# Patient Record
Sex: FEMALE | Race: WHITE | NOT HISPANIC OR LATINO | Employment: OTHER | URBAN - METROPOLITAN AREA
[De-identification: names, ages, dates, MRNs, and addresses within clinical notes are randomized per-mention and may not be internally consistent; named-entity substitution may affect disease eponyms.]

---

## 2017-07-05 ENCOUNTER — NURSE TRIAGE (OUTPATIENT)
Dept: NURSING | Facility: CLINIC | Age: 32
End: 2017-07-05

## 2017-07-06 NOTE — TELEPHONE ENCOUNTER
"Garfield states that she was on a slip and slide yesterday and she hit her hard very hard on the ground and hit a crack and she has immediate pain pain and nausea, no lose of consciousness, today she has felt out of it/spacey, moderate headache, and numbness at the base of her neck. Encourage her to seek treatment at ER and will go to [place].  Reason for Disposition    Dangerous injury (e.g., MVA, diving, trampoline, contact sports, fall > 10 feet or 3 meters) or severe blow from hard object (e.g., golf club or baseball bat)    Additional Information    Negative: [1] ACUTE NEURO SYMPTOM AND [2] present now  (DEFINITION: difficult to awaken OR confused thinking and talking OR slurred speech OR weakness of arms OR unsteady walking)    Negative: Knocked out (unconscious) > 1 minute    Negative: Seizure (convulsion) occurred  (Exception: prior history of seizures and now alert and without Acute Neuro Symptoms)    Negative: Penetrating head injury (e.g., knife, gun shot wound, metal object)    Negative: [1] Major bleeding (e.g., actively dripping or spurting) AND [2] can't be stopped    Negative: [1] Dangerous mechanism of injury (e.g., MVA, diving, trampoline, contact sports, fall > 10 feet or 3 meters) AND [2] NECK pain AND [3] began < 1 hour after injury    Negative: Sounds like a life-threatening emergency to the triager    Negative: [1] Recently examined and diagnosed with a concussion by a healthcare provider AND [2] questions about concussion symptoms    Negative: Can't remember what happened (amnesia)    Negative: Vomiting once or more    Negative: [1] Loss of vision or double vision AND [2] present now    Negative: Watery or blood-tinged fluid dripping from the NOSE or EARS now  (Exception: tears from crying or nosebleed from nasal trauma)    Negative: One or two \"black eyes\" (bruising, purple color of eyelids)    Negative: [1] Large swelling AND [2] size > palm of person's hand    Negative: Skin is split open " or gaping  (or length > 1/2 inch or 12 mm)    Negative: [1] Bleeding AND [2] won't stop after 10 minutes of direct pressure (using correct technique)    Negative: Sounds like a serious injury to the triager    Negative: [1] ACUTE NEURO SYMPTOM AND [2] now fine  (DEFINITION: difficult to awaken OR confused thinking and talking OR slurred speech OR weakness of arms OR unsteady walking)    Negative: [1] Knocked out (unconscious) < 1 minute AND [2] now fine    Negative: [1] SEVERE headache AND [2]  not improved 2 hours after pain medicine/ice packs    Negative: Taking Coumadin (warfarin) or other strong blood thinner, or known bleeding disorder (e.g., thrombocytopenia)    Protocols used: HEAD INJURY-ADULT-    Sue Rodney, RN, BSN  [facility]

## 2017-10-17 ENCOUNTER — TRANSFERRED RECORDS (OUTPATIENT)
Dept: HEALTH INFORMATION MANAGEMENT | Facility: CLINIC | Age: 32
End: 2017-10-17

## 2017-10-17 LAB
HPV ABSTRACT: NORMAL
PAP-ABSTRACT: NORMAL

## 2018-08-21 ENCOUNTER — TRANSFERRED RECORDS (OUTPATIENT)
Dept: HEALTH INFORMATION MANAGEMENT | Facility: CLINIC | Age: 33
End: 2018-08-21

## 2019-03-11 ENCOUNTER — OFFICE VISIT (OUTPATIENT)
Dept: FAMILY MEDICINE | Facility: CLINIC | Age: 34
End: 2019-03-11
Payer: COMMERCIAL

## 2019-03-11 VITALS
WEIGHT: 160 LBS | DIASTOLIC BLOOD PRESSURE: 84 MMHG | HEIGHT: 67 IN | RESPIRATION RATE: 16 BRPM | BODY MASS INDEX: 25.11 KG/M2 | TEMPERATURE: 98 F | HEART RATE: 76 BPM | SYSTOLIC BLOOD PRESSURE: 126 MMHG

## 2019-03-11 DIAGNOSIS — Z00.00 ROUTINE GENERAL MEDICAL EXAMINATION AT A HEALTH CARE FACILITY: Primary | ICD-10-CM

## 2019-03-11 DIAGNOSIS — G43.009 MIGRAINE WITHOUT AURA AND WITHOUT STATUS MIGRAINOSUS, NOT INTRACTABLE: ICD-10-CM

## 2019-03-11 DIAGNOSIS — F41.9 ANXIETY: ICD-10-CM

## 2019-03-11 DIAGNOSIS — Z11.4 SCREENING FOR HIV (HUMAN IMMUNODEFICIENCY VIRUS): ICD-10-CM

## 2019-03-11 DIAGNOSIS — F90.8 ADHD, ADULT RESIDUAL TYPE: ICD-10-CM

## 2019-03-11 PROCEDURE — 80061 LIPID PANEL: CPT | Performed by: FAMILY MEDICINE

## 2019-03-11 PROCEDURE — 99385 PREV VISIT NEW AGE 18-39: CPT | Performed by: FAMILY MEDICINE

## 2019-03-11 PROCEDURE — 36415 COLL VENOUS BLD VENIPUNCTURE: CPT | Performed by: FAMILY MEDICINE

## 2019-03-11 PROCEDURE — 80048 BASIC METABOLIC PNL TOTAL CA: CPT | Performed by: FAMILY MEDICINE

## 2019-03-11 RX ORDER — CLONIDINE 0.2 MG/24H
1 PATCH, EXTENDED RELEASE TRANSDERMAL WEEKLY
COMMUNITY
End: 2019-04-09

## 2019-03-11 RX ORDER — LAMOTRIGINE 100 MG/1
100 TABLET ORAL 2 TIMES DAILY
COMMUNITY
End: 2019-06-10

## 2019-03-11 RX ORDER — CLONIDINE HYDROCHLORIDE 0.2 MG/1
0.2 TABLET ORAL 2 TIMES DAILY
COMMUNITY
End: 2019-06-10

## 2019-03-11 RX ORDER — DEXTROAMPHETAMINE SACCHARATE, AMPHETAMINE ASPARTATE MONOHYDRATE, DEXTROAMPHETAMINE SULFATE AND AMPHETAMINE SULFATE 2.5; 2.5; 2.5; 2.5 MG/1; MG/1; MG/1; MG/1
10 CAPSULE, EXTENDED RELEASE ORAL 2 TIMES DAILY
COMMUNITY
End: 2019-04-09

## 2019-03-11 SDOH — HEALTH STABILITY: MENTAL HEALTH: HOW OFTEN DO YOU HAVE A DRINK CONTAINING ALCOHOL?: 2-3 TIMES A WEEK

## 2019-03-11 ASSESSMENT — ENCOUNTER SYMPTOMS
BREAST MASS: 0
PARESTHESIAS: 0
COUGH: 0
DIZZINESS: 0
SHORTNESS OF BREATH: 0
ARTHRALGIAS: 0
PALPITATIONS: 0
DYSURIA: 0
JOINT SWELLING: 0
EYE PAIN: 0
FEVER: 0
CHILLS: 0
DIARRHEA: 0
WEAKNESS: 1
HEARTBURN: 0
HEADACHES: 1
FREQUENCY: 0
HEMATOCHEZIA: 0
HEMATURIA: 0
NERVOUS/ANXIOUS: 0
SORE THROAT: 0
CONSTIPATION: 0
ABDOMINAL PAIN: 0
MYALGIAS: 1
NAUSEA: 0

## 2019-03-11 ASSESSMENT — MIFFLIN-ST. JEOR: SCORE: 1448.39

## 2019-03-11 NOTE — PROGRESS NOTES
SUBJECTIVE:   CC: Chelle Noyola is an 34 year old woman who presents for preventive health visit.     Healthy Habits:  Answers for HPI/ROS submitted by the patient on 3/11/2019   Annual Exam:  Frequency of exercise:: 1 day/week  Getting at least 3 servings of Calcium per day:: Yes  Diet:: Regular (no restrictions)  Taking medications regularly:: Yes  Medication side effects:: Muscle aches, Other  Bi-annual eye exam:: Yes  Dental care twice a year:: Yes  Sleep apnea or symptoms of sleep apnea:: None  Positive for the following: congestion, headaches, mood changes, myalgias, weakness  Negative for the following: abdominal pain, Blood in stool, Blood in urine, chest pain, chills, constipation, cough, diarrhea, dizziness, ear pain, eye pain, nervous/anxious, fever, frequency, genital sores, hearing loss, heartburn, arthralgias, joint swelling, peripheral edema, nausea, dysuria, palpitations, Skin sensation changes, sore throat, urgency, rash, shortness of breath, visual disturbance  pelvic pain: No  vaginal bleeding: No  vaginal discharge: No  tenderness: No  breast mass: No  breast discharge: No  Additional concerns today:: No  Duration of exercise:: Less than 15 minutes    PmHx, SHx, FHx reviewed and updated.    Last pap 2 years ago at [facility].  No recent history of abnormal pap.    Noting some numbness in legs when standing for a long time.  Some neck pain.  .  Does have [place] CTS and has w/u for MS in the past.  Does see a chiropractor, finds this helpful.    Today's PHQ-2 Score:   PHQ-2 ( 1999 [employer]) 3/11/2019 3/11/2019   Q1: Little interest or pleasure in doing things 0 1   Q2: Feeling down, depressed or hopeless 0 1   PHQ-2 Score 0 2   Q1: Little interest or pleasure in doing things Several days -   Q2: Feeling down, depressed or hopeless Several days -   PHQ-2 Score 2 -     Abuse: Current or Past(Physical, Sexual or Emotional)- No  Do you feel safe in your environment? Yes    Social History  "    Tobacco Use     Smoking status: Former Smoker     Smokeless tobacco: Never Used   Substance Use Topics     Alcohol use: Yes     Frequency: 2-3 times a week     If you drink alcohol do you typically have >3 drinks per day or >7 drinks per week? No                     Reviewed orders with patient.  Reviewed health maintenance and updated orders accordingly - Yes    Mammogram not appropriate for this patient based on age.    Pertinent mammograms are reviewed under the imaging tab.  History of abnormal Pap smear: YES - updated in Problem List and Health Maintenance accordingly     Reviewed and updated as needed this visit by clinical staff  Tobacco  Soc Hx        Reviewed and updated as needed this visit by Provider          ROS:  CONSTITUTIONAL: NEGATIVE for fever, chills, change in weight  INTEGUMENTARU/SKIN: NEGATIVE for worrisome rashes, moles or lesions  EYES: NEGATIVE for vision changes or irritation  ENT: NEGATIVE for ear, mouth and throat problems  RESP: NEGATIVE for significant cough or SOB  BREAST: NEGATIVE for masses, tenderness or discharge  CV: NEGATIVE for chest pain, palpitations or peripheral edema  GI: NEGATIVE for nausea, abdominal pain, heartburn, or change in bowel habits  : NEGATIVE for unusual urinary or vaginal symptoms. Periods are regular.  MUSCULOSKELETAL: NEGATIVE for significant arthralgias or myalgia  NEURO: NEGATIVE for weakness, dizziness or paresthesias  PSYCHIATRIC: NEGATIVE for changes in mood or affect    OBJECTIVE:   /84 (BP Location: Right arm, Patient Position: Chair, Cuff Size: Adult Regular)   Pulse 76   Temp 98  F (36.7  C) (Oral)   Resp 16   Ht 1.702 m (5' 7\")   Wt 72.6 kg (160 lb)   LMP 03/06/2019 (LMP Unknown)   Breastfeeding? No   BMI 25.06 kg/m    EXAM:  GENERAL: healthy, alert and no distress  EYES: Eyes grossly normal to inspection, PERRL and conjunctivae and sclerae normal  HENT: ear canals and TM's normal, nose and mouth without ulcers or " "lesions  NECK: no adenopathy, no asymmetry, masses, or scars and thyroid normal to palpation  RESP: lungs clear to auscultation - no rales, rhonchi or wheezes  BREAST: normal without masses, tenderness or nipple discharge and no palpable axillary masses or adenopathy  CV: regular rate and rhythm, normal S1 S2, no S3 or S4, no murmur, click or rub, no peripheral edema and peripheral pulses strong  ABDOMEN: soft, nontender, no hepatosplenomegaly, no masses and bowel sounds normal  MS: no gross musculoskeletal defects noted, no edema  SKIN: no suspicious lesions or rashes  NEURO: Normal strength and tone, mentation intact and speech normal  PSYCH: mentation appears normal, affect normal/bright    Diagnostic Test Results:  none     ASSESSMENT/PLAN:   1. Routine general medical examination at a health care facility    - Basic metabolic panel  - Lipid panel reflex to direct LDL Fasting    2. Screening for HIV (human immunodeficiency virus)  defer    3. ADHD, adult residual type  Update PL    4. Anxiety  Update PL    5. Migraine without aura and without status migrainosus, not intractable  Updating PL      COUNSELING:   Reviewed preventive health counseling, as reflected in patient instructions       Regular exercise       Vision screening    BP Readings from Last 1 Encounters:   03/11/19 126/84     Estimated body mass index is 25.06 kg/m  as calculated from the following:    Height as of this encounter: 1.702 m (5' 7\").    Weight as of this encounter: 72.6 kg (160 lb).           reports that she has quit smoking. she has never used smokeless tobacco.      Counseling Resources:  ATP IV Guidelines  Pooled Cohorts Equation Calculator  Breast Cancer Risk Calculator  FRAX Risk Assessment  ICSI Preventive Guidelines  Dietary Guidelines for Americans, 2010  USDA's MyPlate  ASA Prophylaxis  Lung CA Screening    Steve Echevarria MD  [facility]  "

## 2019-03-11 NOTE — LETTER
March 14, 2019      Chelle Noyola  [address] [place] [address]        Chelle,     Your recent labs are within acceptable ranges.     Resulted Orders   Basic metabolic panel   Result Value Ref Range    Sodium 136 133 - 144 mmol/L    Potassium 4.3 3.4 - 5.3 mmol/L    Chloride 104 94 - 109 mmol/L    Carbon Dioxide 27 20 - 32 mmol/L    Anion Gap 5 3 - 14 mmol/L    Glucose 94 70 - 99 mg/dL    Urea Nitrogen 9 7 - 30 mg/dL    Creatinine 0.90 0.52 - 1.04 mg/dL    GFR Estimate 83 >60 mL/min/[1.73_m2]      Comment:      Non  GFR Calc  Starting 12/18/2018, serum creatinine based estimated GFR (eGFR) will be   calculated using the Chronic Kidney Disease Epidemiology Collaboration   (CKD-EPI) equation.      GFR Estimate If Black >90 >60 mL/min/[1.73_m2]      Comment:       GFR Calc  Starting 12/18/2018, serum creatinine based estimated GFR (eGFR) will be   calculated using the Chronic Kidney Disease Epidemiology Collaboration   (CKD-EPI) equation.      Calcium 9.1 8.5 - 10.1 mg/dL   Lipid panel reflex to direct LDL Fasting   Result Value Ref Range    Cholesterol 202 (H) <200 mg/dL      Comment:      Desirable:       <200 mg/dl    Triglycerides 59 <150 mg/dL    HDL Cholesterol 75 >49 mg/dL    LDL Cholesterol Calculated 115 (H) <100 mg/dL      Comment:      Above desirable:  100-129 mg/dl  Borderline High:  130-159 mg/dL  High:             160-189 mg/dL  Very high:       >189 mg/dl      Non HDL Cholesterol 127 <130 mg/dL       If you have any questions or concerns, please call the clinic at the number listed above.       Sincerely,    Steve Echevarria MD/hima

## 2019-03-12 LAB
ANION GAP SERPL CALCULATED.3IONS-SCNC: 5 MMOL/L (ref 3–14)
BUN SERPL-MCNC: 9 MG/DL (ref 7–30)
CALCIUM SERPL-MCNC: 9.1 MG/DL (ref 8.5–10.1)
CHLORIDE SERPL-SCNC: 104 MMOL/L (ref 94–109)
CHOLEST SERPL-MCNC: 202 MG/DL
CO2 SERPL-SCNC: 27 MMOL/L (ref 20–32)
CREAT SERPL-MCNC: 0.9 MG/DL (ref 0.52–1.04)
GFR SERPL CREATININE-BSD FRML MDRD: 83 ML/MIN/{1.73_M2}
GLUCOSE SERPL-MCNC: 94 MG/DL (ref 70–99)
HDLC SERPL-MCNC: 75 MG/DL
LDLC SERPL CALC-MCNC: 115 MG/DL
NONHDLC SERPL-MCNC: 127 MG/DL
POTASSIUM SERPL-SCNC: 4.3 MMOL/L (ref 3.4–5.3)
SODIUM SERPL-SCNC: 136 MMOL/L (ref 133–144)
TRIGL SERPL-MCNC: 59 MG/DL

## 2019-04-09 ENCOUNTER — OFFICE VISIT (OUTPATIENT)
Dept: FAMILY MEDICINE | Facility: CLINIC | Age: 34
End: 2019-04-09
Payer: COMMERCIAL

## 2019-04-09 VITALS
WEIGHT: 153 LBS | HEIGHT: 67 IN | HEART RATE: 98 BPM | SYSTOLIC BLOOD PRESSURE: 145 MMHG | BODY MASS INDEX: 24.01 KG/M2 | TEMPERATURE: 98.7 F | DIASTOLIC BLOOD PRESSURE: 95 MMHG | RESPIRATION RATE: 16 BRPM | OXYGEN SATURATION: 100 %

## 2019-04-09 DIAGNOSIS — R03.0 ELEVATED BP WITHOUT DIAGNOSIS OF HYPERTENSION: ICD-10-CM

## 2019-04-09 DIAGNOSIS — F41.9 ANXIETY: ICD-10-CM

## 2019-04-09 DIAGNOSIS — F90.8 ADHD, ADULT RESIDUAL TYPE: Primary | ICD-10-CM

## 2019-04-09 PROCEDURE — 99214 OFFICE O/P EST MOD 30 MIN: CPT | Performed by: NURSE PRACTITIONER

## 2019-04-09 RX ORDER — DEXTROAMPHETAMINE SACCHARATE, AMPHETAMINE ASPARTATE MONOHYDRATE, DEXTROAMPHETAMINE SULFATE AND AMPHETAMINE SULFATE 2.5; 2.5; 2.5; 2.5 MG/1; MG/1; MG/1; MG/1
10 CAPSULE, EXTENDED RELEASE ORAL DAILY
Qty: 30 CAPSULE | Refills: 0 | Status: SHIPPED | OUTPATIENT
Start: 2019-04-09 | End: 2019-04-15

## 2019-04-09 RX ORDER — DEXTROAMPHETAMINE SACCHARATE, AMPHETAMINE ASPARTATE MONOHYDRATE, DEXTROAMPHETAMINE SULFATE AND AMPHETAMINE SULFATE 2.5; 2.5; 2.5; 2.5 MG/1; MG/1; MG/1; MG/1
10 CAPSULE, EXTENDED RELEASE ORAL 2 TIMES DAILY
Status: CANCELLED | OUTPATIENT
Start: 2019-04-09

## 2019-04-09 ASSESSMENT — ANXIETY QUESTIONNAIRES
6. BECOMING EASILY ANNOYED OR IRRITABLE: NEARLY EVERY DAY
3. WORRYING TOO MUCH ABOUT DIFFERENT THINGS: MORE THAN HALF THE DAYS
GAD7 TOTAL SCORE: 12
2. NOT BEING ABLE TO STOP OR CONTROL WORRYING: MORE THAN HALF THE DAYS
5. BEING SO RESTLESS THAT IT IS HARD TO SIT STILL: SEVERAL DAYS
1. FEELING NERVOUS, ANXIOUS, OR ON EDGE: MORE THAN HALF THE DAYS
7. FEELING AFRAID AS IF SOMETHING AWFUL MIGHT HAPPEN: SEVERAL DAYS

## 2019-04-09 ASSESSMENT — PATIENT HEALTH QUESTIONNAIRE - PHQ9
SUM OF ALL RESPONSES TO PHQ QUESTIONS 1-9: 8
5. POOR APPETITE OR OVEREATING: SEVERAL DAYS

## 2019-04-09 ASSESSMENT — MIFFLIN-ST. JEOR: SCORE: 1416.63

## 2019-04-09 NOTE — PROGRESS NOTES
"  SUBJECTIVE:   Chelle Noyola is a 34 year old female who presents to clinic today for the following   health issues:         Medication Followup of Adderall    Taking Medication as prescribed: yes    Side Effects:  None    Medication Helping Symptoms:  yes   Prior to adderall was on vyvanse--too expensive.  Depression/anxiety--effexor; now lamictal/clonidine.         She was being seen at [place] by Karen Diaz and had testing for ADHD.  She is currently taking 20 mg of adderall xr daily.  She tried vyvanse and this was too expensive.  She occasionally feels itchy, so will take benadryl as needed.  She is taking adderall for focus/inattention and fidgeting.  This helps, but sometimes feels like she has an \"air head\".  She is taking 100 mg of lamictal in the morning for PTSD/anxiety.  She takes 1/2 tab of clonidine TID, but sometimes only BID in addition to lamictal.  Her [facility] provider told her she could \"play around\" with the dosing.  The clonidine was for irritability.  Prior to this combo she was on effexor, but was gaining weight so switched meds. Typically not someone who cries, but lately has been crying more.  Having intermittent episodes of increased anxiety.  Has tried several meds in the past.    Effexor--weight gain  zoloft--pain attacks  lexapro--not sure, thinks it just didn't work?  wellbutrin--not sure, thinks it just didn't work?  buspar--not sure what side effect she experienced with this   ISABEL-7 SCORE 4/9/2019 4/15/2019   Total Score - 13 (moderate anxiety)   Total Score 12 13     PHQ-9 SCORE 4/9/2019 4/15/2019   PHQ-9 Total Score MyChart - 9 (Mild depression)   PHQ-9 Total Score 8 9                 Additional history: as documented    Reviewed  and updated as needed this visit by clinical staff  Tobacco  Allergies  Meds  Med Hx  Surg Hx  Fam Hx  Soc Hx        Reviewed and updated as needed this visit by Provider         Current Outpatient Medications   Medication Sig Dispense Refill " "    cloNIDine (CATAPRES) 0.2 MG tablet Take 0.2 mg by mouth 2 times daily 0.2 mg BID plus 1/2 tab prn       lamoTRIgine (LAMICTAL) 100 MG tablet Take 100 mg by mouth 2 times daily       [START ON 5/9/2019] amphetamine-dextroamphetamine (ADDERALL XR) 10 MG 24 hr capsule Take 1 capsule (10 mg) by mouth daily 30 capsule 0     FLUoxetine (PROZAC) 10 MG capsule 1 cap daily for 7 days, then increase to 20 mg daily 7 capsule 0     FLUoxetine (PROZAC) 20 MG capsule Take 1 capsule (20 mg) by mouth daily 30 capsule 1     No Known Allergies    ROS:  Constitutional, HEENT, cardiovascular, pulmonary, gi and gu and psych systems are negative, except as otherwise noted.    OBJECTIVE:     BP (!) 145/95   Pulse 98   Temp 98.7  F (37.1  C) (Oral)   Resp 16   Ht 1.702 m (5' 7\")   Wt 69.4 kg (153 lb)   LMP 03/06/2019 (LMP Unknown)   SpO2 100%   BMI 23.96 kg/m    Body mass index is 23.96 kg/m .  GENERAL: healthy, alert and no distress  PSYCH: mentation appears normal, affect normal/bright, tangential/scattered    Diagnostic Test Results:  none     ASSESSMENT/PLAN:   1. ADHD, adult residual type  Currently on adderall.  She's not sure how well this is working and possibly causing bothersome side effects.  Will defer changes to psych.    - MENTAL HEALTH REFERRAL  - Adult; Psychiatry and Medication Management; Psychiatry; [facility]: [facility] [phone].  Medication management & future refills will be returned to [facility] PCP upon completion of evaluation; We xiomara...    2. Anxiety  Currently on lamictal and clonidine which are medications I don't generally use for anxiety and it doesn't seem that these are working well for her.  She has certainly tried several other medications and stopped them for various reasons/side effects.   Given several med trials will have her see psych.  She agrees with plan today.   - MENTAL HEALTH REFERRAL  - Adult; Psychiatry and Medication Management; Psychiatry; [facility]: [facility]" [facility] Psychiatry Service [phone].  Medication management & future refills will be returned to [facility] PCP upon completion of evaluation; We xiomara...    3. Elevated BP without diagnosis of hypertension  BP elevated today though did improve some on recheck.  Discussed will need to monitor closely if is going to continue on a stimulant.      RAFI Buckley [facility]

## 2019-04-09 NOTE — PROGRESS NOTES
"  SUBJECTIVE:   Chelle Noyola is a 34 year old female who presents to clinic today for the following   health issues:      {Chronic Problem SUPERLIST:020286}    Amount of exercise or physical activity: {Exercise frequency days per week:528870}    Problems taking medications regularly: {Med Problems:814514::\"No\"}    Medication side effects: {CHRONIC MED SIDE EFFECTS:137378::\"none\"}    Diet: { :220609}        {additional problems for provider to add:949238}    Additional history: {NONE - AS DOCUMENTED:466207::\"as documented\"}    Reviewed  and updated as needed this visit by clinical staff         Reviewed and updated as needed this visit by Provider         {HIST REVIEW/ LINKS 2:519572}    {PROVIDER CHARTING PREFERENCE:538186}        "

## 2019-04-10 ASSESSMENT — ANXIETY QUESTIONNAIRES: GAD7 TOTAL SCORE: 12

## 2019-04-15 ENCOUNTER — OFFICE VISIT (OUTPATIENT)
Dept: PSYCHIATRY | Facility: CLINIC | Age: 34
End: 2019-04-15
Attending: NURSE PRACTITIONER
Payer: COMMERCIAL

## 2019-04-15 VITALS
DIASTOLIC BLOOD PRESSURE: 78 MMHG | WEIGHT: 153 LBS | SYSTOLIC BLOOD PRESSURE: 117 MMHG | HEART RATE: 103 BPM | RESPIRATION RATE: 14 BRPM | OXYGEN SATURATION: 100 % | BODY MASS INDEX: 23.96 KG/M2

## 2019-04-15 DIAGNOSIS — F90.8 ADHD, ADULT RESIDUAL TYPE: ICD-10-CM

## 2019-04-15 DIAGNOSIS — F43.10 PTSD (POST-TRAUMATIC STRESS DISORDER): ICD-10-CM

## 2019-04-15 DIAGNOSIS — F41.1 GENERALIZED ANXIETY DISORDER: Primary | ICD-10-CM

## 2019-04-15 PROCEDURE — 90792 PSYCH DIAG EVAL W/MED SRVCS: CPT | Performed by: NURSE PRACTITIONER

## 2019-04-15 RX ORDER — DEXTROAMPHETAMINE SACCHARATE, AMPHETAMINE ASPARTATE MONOHYDRATE, DEXTROAMPHETAMINE SULFATE AND AMPHETAMINE SULFATE 2.5; 2.5; 2.5; 2.5 MG/1; MG/1; MG/1; MG/1
10 CAPSULE, EXTENDED RELEASE ORAL DAILY
Qty: 30 CAPSULE | Refills: 0 | Status: SHIPPED | OUTPATIENT
Start: 2019-05-09 | End: 2019-05-16

## 2019-04-15 RX ORDER — FLUOXETINE 10 MG/1
CAPSULE ORAL
Qty: 7 CAPSULE | Refills: 0 | Status: SHIPPED | OUTPATIENT
Start: 2019-04-15 | End: 2019-06-10

## 2019-04-15 ASSESSMENT — ANXIETY QUESTIONNAIRES
3. WORRYING TOO MUCH ABOUT DIFFERENT THINGS: MORE THAN HALF THE DAYS
4. TROUBLE RELAXING: SEVERAL DAYS
GAD7 TOTAL SCORE: 13
GAD7 TOTAL SCORE: 13
2. NOT BEING ABLE TO STOP OR CONTROL WORRYING: MORE THAN HALF THE DAYS
7. FEELING AFRAID AS IF SOMETHING AWFUL MIGHT HAPPEN: SEVERAL DAYS
GAD7 TOTAL SCORE: 13
7. FEELING AFRAID AS IF SOMETHING AWFUL MIGHT HAPPEN: SEVERAL DAYS
6. BECOMING EASILY ANNOYED OR IRRITABLE: NEARLY EVERY DAY
5. BEING SO RESTLESS THAT IT IS HARD TO SIT STILL: SEVERAL DAYS
1. FEELING NERVOUS, ANXIOUS, OR ON EDGE: NEARLY EVERY DAY

## 2019-04-15 ASSESSMENT — PATIENT HEALTH QUESTIONNAIRE - PHQ9
SUM OF ALL RESPONSES TO PHQ QUESTIONS 1-9: 9
10. IF YOU CHECKED OFF ANY PROBLEMS, HOW DIFFICULT HAVE THESE PROBLEMS MADE IT FOR YOU TO DO YOUR WORK, TAKE CARE OF THINGS AT HOME, OR GET ALONG WITH OTHER PEOPLE: SOMEWHAT DIFFICULT
SUM OF ALL RESPONSES TO PHQ QUESTIONS 1-9: 9

## 2019-04-15 NOTE — PROGRESS NOTES
"                                                         Outpatient Psychiatric Evaluation- Standard  Adult    Name:  Chelle Noyola  : 1985    Source of Referral:  Primary Care Provider: Janice Lemus?   Last visit: 19  Current Psychotherapist: None   Last visit: Not Interested     Identifying Data:  Patient is a 34 year old,   White American female  who presents for initial visit with me.  Patient is currently employed full time. Patient attended the session with Martir , who they agreed to have interview with. Consent to communicate signed for no-one. Consent for treatment signed and included in electronic medical record. Discussed limits of confidentiality today. My Practice Policy was reviewed and signed.     Patient prefers to be called: \"Chelle\"    Chief Complaint:    Patient reports: \"Weaned off Effexor due to side effects. It's been a roller coaster since.\"      HPI:    Patient reports being diagnosed with PTSD at age 17. Did not detail nature of trauma with me today. States he was prescribed some sequence of medications at that time and had side effects so \"I said to hell with meds\" and did not return to treatment. She sought out MH treatment again 5 years ago as son was developing and having MH issues of his own. Was having migraines and \"whirlwind\" emotions.    Early medication trials largely through PCP. Took Celexa, Lexapro, Zoloft, Cymbalta, Wellbutrin. Has admittedly vague recall of these trials. Just knows Zoloft induced panic and Wellbutrin seemed to worsen migraines. Thinks other antidepressants \"helped for a while, then didn't.\" Ended up on Effexor, which she had been taking for several years.    Was last in treatment with a psychiatrist, Karen Diaz, at [facility]. Saw Dr. Diaz for 6 months. Was also evaluated for, and diagnosed with, ADHD via testing. Was taking Effexor 150 mg daily during this period. Helped with irritability and anxiety, but still felt " "depressed. Also had 35 lb gradual weight gain. Dr. Diaz decreased Effexor to 112.5 mg daily. Also started her on Lamictal and Clonidine. She tapered Lamictal to 200 mg daily, but thinks this induced paranoia, so returned to 100 mg daily. She's not sure what it's doing for her other than \"dumbing me down some.\" States Clonidine has been helpful and was given torsten-way by Dr. Diaz to dose as needed. So will usually take 0.2 mg BID plus additional 0.1 mg as needed. Helps with anxiety and sleep quality, though does get some dizziness and speech slurring. Denies over sedation. Following ADHD diagnosis was prescribed Vyvanse; not sure of dosage; and endorses good response towards attention; felt calmer on it. Insurance would not cover Vyvanse, so she was switched to Adderall XR; states it's not as helpful or well tolerated at Vyvanse; 20 mg causes itchiness, so she's on 10 mg daily. She weaned herself off Effexor in 12/2018 and had transient withdrawal symptoms. She stopped seeing Dr. Diaz in 03/2019 as her insurance switched, and as psychiatrist left practice.    States accordingly her irritability and anxiety have rebounded. \"Everything bothers me\"; \"quick to snap\". Easy crying, racing thoughts, palpitations, shortness of breath. Her ADHD symptoms are pronounced and she was rather scattered in interview with me today.    States her 8 y/o son is doing quite well with Prozac, Ritalin and Guanfacine. Wondering if any of those medications could help her.    She is not interested in psychotherapy at this time.        Psychiatric Review of Symptoms:     PHQ-9 scores:   PHQ-9 SCORE 4/9/2019 4/15/2019   PHQ-9 Total Score MyChart - 9 (Mild depression)   PHQ-9 Total Score 8 9        ISABEL-7 scores:    ISABEL-7 SCORE 4/9/2019 4/15/2019   Total Score - 13 (moderate anxiety)   Total Score 12 13     Answers for HPI/ROS submitted by the patient on 4/15/2019   If you checked off any problems, how difficult have these problems made " it for you to do your work, take care of things at home, or get along with other people?: Somewhat difficult  PHQ9 TOTAL SCORE: 9  ISABEL 7 TOTAL SCORE: 13        Psychiatric History:   No psychiatric hospitalizations  No suicide attempts    Substance Use History:  Minimal EtOH  Denies other substance use  Tobacco: Yes; not looking to quit yet      Past Medical History:  History reviewed. No pertinent past medical history.   Surgery: History reviewed. No pertinent surgical history.  Allergies:   No Known Allergies  Primary Care Provider: Physician No Ref-Primary      Current Medications:    Current Outpatient Medications:      amphetamine-dextroamphetamine (ADDERALL XR) 10 MG 24 hr capsule, Take 1 capsule (10 mg) by mouth daily, Disp: 30 capsule, Rfl: 0     cloNIDine (CATAPRES) 0.2 MG tablet, Take 0.2 mg by mouth 2 times daily 0.2 mg BID plus 1/2 tab prn, Disp: , Rfl:      lamoTRIgine (LAMICTAL) 100 MG tablet, Take 100 mg by mouth 2 times daily, Disp: , Rfl:     The [place] Prescription Monitoring Program has been reviewed and there are no concerns about diversionary activity for controlled substances at this time.    Vital Signs:  Vitals: /78 (BP Location: Right arm, Patient Position: Chair, Cuff Size: Adult Large)   Pulse 103   Resp 14   Wt 69.4 kg (153 lb)   SpO2 100%   Breastfeeding? No   BMI 23.96 kg/m      Labs:  Most recent laboratory results reviewed and the pertinent results include:  Last Comprehensive Metabolic Panel:  Sodium   Date Value Ref Range Status   03/11/2019 136 133 - 144 mmol/L Final     Potassium   Date Value Ref Range Status   03/11/2019 4.3 3.4 - 5.3 mmol/L Final     Chloride   Date Value Ref Range Status   03/11/2019 104 94 - 109 mmol/L Final     Carbon Dioxide   Date Value Ref Range Status   03/11/2019 27 20 - 32 mmol/L Final     Anion Gap   Date Value Ref Range Status   03/11/2019 5 3 - 14 mmol/L Final     Glucose   Date Value Ref Range Status   03/11/2019 94 70 - 99 mg/dL  Final     Urea Nitrogen   Date Value Ref Range Status   03/11/2019 9 7 - 30 mg/dL Final     Creatinine   Date Value Ref Range Status   03/11/2019 0.90 0.52 - 1.04 mg/dL Final     GFR Estimate   Date Value Ref Range Status   03/11/2019 83 >60 mL/min/[1.73_m2] Final     Comment:     Non  GFR Calc  Starting 12/18/2018, serum creatinine based estimated GFR (eGFR) will be   calculated using the Chronic Kidney Disease Epidemiology Collaboration   (CKD-EPI) equation.       Calcium   Date Value Ref Range Status   03/11/2019 9.1 8.5 - 10.1 mg/dL Final         Review of Systems:  10 systems (general, cardiovascular, respiratory, eyes, ENT, endocrine, GI, , M/S, neurological) were reviewed. Most pertinent finding(s) is/are:  all unremarkable.    Family History:   Patient reported family history includes:   Family History   Problem Relation Age of Onset     Anxiety Disorder Mother      Other - See Comments Father 40        Accident     Attention Deficit Disorder Sister      Developmental delay Brother      Attention Deficit Disorder Son          Social History:   Grew up in [facility]  1 sister, 3 brothers  , 8 y/o son  Used to work in hospice; currently works in nail salon      Mental Status Examination:     Appearance:  awake, alert and adequately groomed  Attitude:  cooperative   Eye Contact:  good  Gait and Station: Normal  Psychomotor Behavior:  intact station, gait and muscle tone  Oriented to:  time, person, and place  Attention Span and Concentration:  Normal  Speech:  clear, coherent  Mood:  anxious  Affect:  appropriate and in normal range  Associations:  no loose associations  Thought Process:  tangental  Thought Content:  Appropriate to Interview  Recent and Remote Memory:  fair Not formally assessed. No amnesia.  Fund of Knowledge: appropriate  Insight:  good  Judgment:  intact  Impulse Control:  intact    Suicide Risk Assessment:  Today Chelle Noyola denies suicidal ideation or self-harm  impulses. Therefore, based on all available evidence including the factors cited above, Chelle Noyola does not appear to be at imminent risk for self-harm, does not meet criteria for a 72-hr hold, and therefore remains appropriate for ongoing outpatient level of care.  A thorough assessment of risk factors related to suicide and self-harm have been reviewed and are noted above. The patient convincingly denies acute suicidality on several occasions. Local community safety resources reviewed and printed for patient to use if needed. There was no deceit detected, and the patient presented in a manner that was believable.     DSM5  Diagnosis:  Attention-Deficit/Hyperactivity Disorder  314.01 (F90.9) Unspecified Attention -Deficit / Hyperactivity Disorder  296.32 (F33.1) Major Depressive Disorder, Recurrent Episode, Moderate _  309.81 (F43.10) Posttraumatic Stress Disorder (includes Posttraumatic Stress Disorder for Children 6 Years and Younger)  With dissociative symptoms    Medical Comorbidities Include:   Patient Active Problem List    Diagnosis Date Noted     ADHD, adult residual type 03/11/2019     Priority: Medium     Anxiety 03/11/2019     Priority: Medium     Migraine without aura and without status migrainosus, not intractable 03/11/2019     Priority: Medium       A 12-item WHODAS 2.0 assessment was completed by the patient today and recorded in EPIC.    WHODAS 2.0 Total Score 4/15/2019   Total Score 28   Total Score MyChart 28       The Patient Activation Measure (MINESH) score was completed and recorded in [facility]. This assesses patient knowledge, skill, and confidence for self-management. No flowsheet data found.             Impression:  Chelle Noyola is a 34 year old female, who is new to [place], who has a history of PTSD, depression and ADHD. There are no records yet available from previous psychiatric treatment at [facility]. She was pleasant and well-meaning, but was a limited historian and rather scattered  in interview today, but we made the most of the circumstances. I tend to believe she would truly first benefit from a serotonergic agent, should she find one tolerable and with sustained benefit. Lamictal seems perhaps superfluous, and she is over-utilizing Clonidine. In fact, her inconsistent dosing of higher-dose Clonidine may set her up for rebound hypertension.    Given her son's apparent benefit from Prozac, we agree to trial this presently. We'll maintain Lamictal at 100 mg for now, but down the road if she's doing well, we might be able to taper down on this. She should aim for consistency in her Clonidine dosing, and we agree to a schedule of 0.1 mg in AM, 0.2 mg at bedtime and an additional 0.1 mg as needed. She's advised not to abruptly discontinue Clonidine. It's a shame she cannot get Vyvanse covered by her insurance (she did check into this already with her new plan). We'll continue to utilize a sub-par Adderall XR 10 mg at this time, but I do wonder if down the line a switch to Concerta might be beneficial.    I think psychotherapy is advisable, but she is deferring on this presently.    Medication side effects and alternatives reviewed. Health promotion activities recommended and reviewed today. All questions addressed. Education and counseling completed regarding risks and benefits of medications and psychotherapy options. Collaborative Care Psychiatry Service model reviewed today. Recommend therapy for additional support.     Treatment Plan:    Start Prozac: 10 mg daily for 1 week, then increase to 20 mg daily    Continue Lamictal 100 mg daily    Continue Clonidine 0.1 mg in AM, 0.2 mg at bedtime and an additional 0.1 mg as needed    Continue Adderall XR 10 mg daily    Continue all other medical directions per primary care provider.     Continue all other medications as reviewed per electronic medical record today.     Safety plan reviewed. To the Emergency Department as needed or call after hours  crisis line at [phone] or [phone]. [place] Crisis Text Line: Text [place] to 972068  or  Suicide LifeLine Chat: [employer].org/chat/    Schedule an appointment with me in 4-6 weeks or sooner as needed.  Call [facility] at [phone] to schedule.    Follow up with primary care provider as planned or for acute medical concerns.    Call the psychiatric nurse line with medication questions or concerns at [phone].    [employer] may be used to communicate with your provider, but this is not intended to be used for emergencies.      Community Resources:    National Suicide Prevention Lifeline: 398.850.2103 (TTY: 627.447.9818). Call anytime for help.  (www.suicidepreventionlifeline.org)  National [place] on Mental Illness (www.coty.org): 191.455.9930 or 849-260-8071.   Mental Health Association (www.mentalhealth.org): 752.187.4897 or 512-844-5370.  [place] Crisis Text Line: Text [place] to 380400  Suicide LifeLine Chat: [employer].org/chat    Administrative Billing:   Time spent with patient was 60 minutes and greater than 50% of time or 40 minutes was spent in counseling and coordination of care regarding above diagnoses and treatment plan.    Patient Status:  Patient will continue to be seen for ongoing consultation and stabilization.    Signed:   Yovanny Lopez, CNP  Psychiatry

## 2019-04-16 PROBLEM — R03.0 ELEVATED BP WITHOUT DIAGNOSIS OF HYPERTENSION: Status: ACTIVE | Noted: 2019-04-16

## 2019-04-16 ASSESSMENT — ANXIETY QUESTIONNAIRES: GAD7 TOTAL SCORE: 13

## 2019-04-16 ASSESSMENT — PATIENT HEALTH QUESTIONNAIRE - PHQ9: SUM OF ALL RESPONSES TO PHQ QUESTIONS 1-9: 9

## 2019-05-13 ENCOUNTER — TELEPHONE (OUTPATIENT)
Dept: PSYCHIATRY | Facility: CLINIC | Age: 34
End: 2019-05-13

## 2019-05-13 DIAGNOSIS — F90.8 ADHD, ADULT RESIDUAL TYPE: Primary | ICD-10-CM

## 2019-05-13 NOTE — TELEPHONE ENCOUNTER
Reached pt and scheduled for 6/10 AP    (soonest opening). Pt would like to get in sooner or wondering if medication can be switched without her coming in.     Diana Urena

## 2019-05-13 NOTE — TELEPHONE ENCOUNTER
Patient is due back for an ov this week. Will discuss with provider.      checked. She didn't fill Adderall ordered to start 5/9/19.   Fill Date ID Written     Drug  Qty Days Prescriber   04/10/2019 1 04/09/2019 Dextroamp-Amphet Er 10 Mg Cap   30 30 Kr Str   03/06/2019 1 03/04/2019 Dextroamp-Amphet Er 10 Mg Cap  60 30 Ca Mat

## 2019-05-13 NOTE — TELEPHONE ENCOUNTER
Patient calling wanting a call back regarding her adderall rx(generic) . She is wanting to see if she can switch to Ritalin. Please review and contact patient.

## 2019-05-15 NOTE — TELEPHONE ENCOUNTER
"RN spoke to patient and let her know Yovanny is out of the office today and will not be able to respond to her request until he returns.    She reported Adderall is making her anxious and irritable though it is helpful for focus. Side effects are less if she takes it only a few days a week. She hopes to switch to Ritalin, since it works for her son.      Prozac has been going well w/o negative side effects. \"Everything has been better with Prozac.\"    If Ritalin is approved, she wishes to  the paper RX at the [facility].    "

## 2019-05-16 RX ORDER — METHYLPHENIDATE HYDROCHLORIDE 18 MG/1
18 TABLET ORAL EVERY MORNING
Qty: 30 TABLET | Refills: 0 | Status: SHIPPED | OUTPATIENT
Start: 2019-05-16 | End: 2019-06-10

## 2019-05-16 NOTE — TELEPHONE ENCOUNTER
Glad to hear Prozac is working out for her. Yes, will discontinue Adderall XR and trial Concerta. I believe the ER form will be more agreeable and less harsh to her anxiety than Ritalin. She'll start at 18 mg qAM, and we can review this at the next appointment on 6/10/19.    Concerta Rx will be available for pick-up at [facility].

## 2019-05-16 NOTE — TELEPHONE ENCOUNTER
I spoke to Chelle and did some patient education on Concerta. She is happy with the plan and has her next appt scheduled.

## 2019-05-21 ENCOUNTER — TELEPHONE (OUTPATIENT)
Dept: PSYCHIATRY | Facility: CLINIC | Age: 34
End: 2019-05-21

## 2019-05-21 NOTE — TELEPHONE ENCOUNTER
Yes, patient can increase Concerta to 36 mg daily. She just picked up 18 mg Rx, so can take two per morning. She can let us know if she needs renewal of this before we meet again, but figured we can at least test out the dosing first.

## 2019-05-21 NOTE — TELEPHONE ENCOUNTER
Patient just picked up Rx from clinic at 12:38pm on 5/20/19.    [place]  reviewed: patient has not picked up medication yet.  Fill Date  Drug      Qty  Days  Prescriber  04/10/2019  Dextroamp-Amphet Er 10 Mg Cap  30  30  Kr Str    03/06/2019  Dextroamp-Amphet Er 10 Mg Cap  60 3 30  Ca Mat      Routing to provider.     Areli Landaverde RN  05/21/19  1:48 PM

## 2019-05-21 NOTE — TELEPHONE ENCOUNTER
Reason for Call:  prescription    Detailed comments: pt is wondering if Concerta can be increased, as she feels it's not working. Pt reports that she still distracted, but no side effects.     Phone Number Patient can be reached at: Home number on file [phone] (home)    Best Time: anytime    Can we leave a detailed message on this number? YES    Call taken on 5/21/2019 at 12:40 PM by Diana Urena

## 2019-05-22 NOTE — TELEPHONE ENCOUNTER
SHANTI with message from provider. Patient informed to call back if any other questions arise, or if a refill will be needed before visit with Yovanny.    Areli Landaverde RN  05/22/19  9:14 AM

## 2019-06-10 ENCOUNTER — OFFICE VISIT (OUTPATIENT)
Dept: PSYCHIATRY | Facility: CLINIC | Age: 34
End: 2019-06-10
Payer: COMMERCIAL

## 2019-06-10 VITALS
WEIGHT: 146 LBS | RESPIRATION RATE: 12 BRPM | DIASTOLIC BLOOD PRESSURE: 87 MMHG | BODY MASS INDEX: 22.87 KG/M2 | OXYGEN SATURATION: 99 % | SYSTOLIC BLOOD PRESSURE: 121 MMHG | HEART RATE: 87 BPM

## 2019-06-10 DIAGNOSIS — F43.10 PTSD (POST-TRAUMATIC STRESS DISORDER): ICD-10-CM

## 2019-06-10 DIAGNOSIS — F90.8 ADHD, ADULT RESIDUAL TYPE: Primary | ICD-10-CM

## 2019-06-10 DIAGNOSIS — F41.1 GENERALIZED ANXIETY DISORDER: ICD-10-CM

## 2019-06-10 PROCEDURE — 99214 OFFICE O/P EST MOD 30 MIN: CPT | Performed by: NURSE PRACTITIONER

## 2019-06-10 RX ORDER — CLONIDINE HYDROCHLORIDE 0.1 MG/1
0.1 TABLET ORAL 2 TIMES DAILY
Qty: 60 TABLET | Refills: 1 | Status: SHIPPED | OUTPATIENT
Start: 2019-06-10 | End: 2019-08-01

## 2019-06-10 RX ORDER — LAMOTRIGINE 100 MG/1
100 TABLET ORAL DAILY
Qty: 30 TABLET | Refills: 1 | Status: SHIPPED | OUTPATIENT
Start: 2019-06-10 | End: 2019-08-01

## 2019-06-10 RX ORDER — METHYLPHENIDATE HYDROCHLORIDE 36 MG/1
36 TABLET ORAL EVERY MORNING
Qty: 30 TABLET | Refills: 0 | Status: SHIPPED | OUTPATIENT
Start: 2019-06-10 | End: 2019-07-12

## 2019-06-10 ASSESSMENT — ANXIETY QUESTIONNAIRES
4. TROUBLE RELAXING: SEVERAL DAYS
7. FEELING AFRAID AS IF SOMETHING AWFUL MIGHT HAPPEN: SEVERAL DAYS
6. BECOMING EASILY ANNOYED OR IRRITABLE: SEVERAL DAYS
5. BEING SO RESTLESS THAT IT IS HARD TO SIT STILL: SEVERAL DAYS
GAD7 TOTAL SCORE: 7
GAD7 TOTAL SCORE: 7
3. WORRYING TOO MUCH ABOUT DIFFERENT THINGS: SEVERAL DAYS
GAD7 TOTAL SCORE: 7
7. FEELING AFRAID AS IF SOMETHING AWFUL MIGHT HAPPEN: SEVERAL DAYS
2. NOT BEING ABLE TO STOP OR CONTROL WORRYING: SEVERAL DAYS
1. FEELING NERVOUS, ANXIOUS, OR ON EDGE: SEVERAL DAYS

## 2019-06-10 ASSESSMENT — PATIENT HEALTH QUESTIONNAIRE - PHQ9
SUM OF ALL RESPONSES TO PHQ QUESTIONS 1-9: 8
SUM OF ALL RESPONSES TO PHQ QUESTIONS 1-9: 8
10. IF YOU CHECKED OFF ANY PROBLEMS, HOW DIFFICULT HAVE THESE PROBLEMS MADE IT FOR YOU TO DO YOUR WORK, TAKE CARE OF THINGS AT HOME, OR GET ALONG WITH OTHER PEOPLE: SOMEWHAT DIFFICULT

## 2019-06-10 NOTE — PROGRESS NOTES
"    Outpatient Psychiatric Progress Note    Name: Chelle Noyola   : 1985                    Primary Care Provider: Physician No Ref-Primary   Therapist: None     PHQ-9 scores:  PHQ-9 SCORE 2019 4/15/2019   PHQ-9 Total Score MyChart - 9 (Mild depression)   PHQ-9 Total Score 8 9       ISABEL-7 scores:  ISABEL-7 SCORE 2019 4/15/2019   Total Score - 13 (moderate anxiety)   Total Score 12 13       Patient Identification:  Patient is a 34 year old year old,   White American female  who presents for return visit with me.  Patient is currently employed full time. Patient attended the session with Martir , who they agreed to have interview with. Patient prefers to be called: \"Chelle\".    Interim History:  I last saw Chelle Noyola for outpatient psychiatry Consultation on 4/15/19.     During that appointment, we reviewed her psychiatric history, and her ongoing issues with inattention, irritability and anxiety. We agreed to start Prozac (as son benefits) and continue Adderall XR. She was to continue Lamictal, and be more consistent with her Clonidine dosing. She deferred on psychotherapy.    She later called us to state Adderall XR was rather intolerable; we switched to Concerta. She messaged again later to request an increase dosage of Concerta.      Current medications include:   Current Outpatient Medications   Medication Sig     cloNIDine (CATAPRES) 0.2 MG tablet Take 0.2 mg by mouth 2 times daily 0.2 mg BID plus 1/2 tab prn     FLUoxetine (PROZAC) 20 MG capsule Take 1 capsule (20 mg) by mouth daily     lamoTRIgine (LAMICTAL) 100 MG tablet Take 100 mg by mouth 2 times daily     methylphenidate (CONCERTA) 18 MG CR tablet Take 1 tablet (18 mg) by mouth every morning     FLUoxetine (PROZAC) 10 MG capsule 1 cap daily for 7 days, then increase to 20 mg daily (Patient not taking: Reported on 6/10/2019)     No current facility-administered medications for this visit.        The [place] Prescription Monitoring Program " "has been reviewed and there are no concerns about diversionary activity for controlled substances at this time.      I was able to review most recent Primary Care Provider, specialty provider, and therapy visit notes that I have access to.     Today, patient reports she has been taking Prozac for past 7 weeks, at 20 mg dialy, and it is working quite well for her; \"night and day\"; mood less irritable and anxiety under better control; no side effects to report. She tried Adderall XR 10 mg but had increased irritability and shortness of breath. Finding Concerta to work better and is much more tolerable; 18 mg was only partly effective; 36 mg works better, but occasionally has some edginess on this, for which Clonidine is helpful. Has overall decreased Clonidine daily dosage; been taking Clonidine 0.1 mg qAM, and an additional 0.1 at night as needed. Lamictal still at 100 mg daily.    Ran out of Concerta, so a bit restless and scattered today.      No past medical history on file.   has no past medical history on file.    Social history updates:  No major updates to report    Substance use updates:  Minimal EtOH  Denies other substance use  Tobacco: Yes; not looking to quit yet      Vital Signs:   /87 (BP Location: Right arm, Patient Position: Chair, Cuff Size: Adult Large)   Pulse 87   Resp 12   Wt 66.2 kg (146 lb)   LMP 05/13/2019 (Approximate)   SpO2 99%   Breastfeeding? No   BMI 22.87 kg/m      Labs:  Most recent laboratory results reviewed and no new labs.    Review of Systems:  10 systems (general, cardiovascular, respiratory, eyes, ENT, endocrine, GI, , M/S, neurological) were reviewed. Most pertinent finding(s) is/are: all unremarkable.    Mental Status Examination:     Appearance:  awake, alert and adequately groomed  Attitude:  cooperative   Eye Contact:  good  Gait and Station: Normal  Psychomotor Behavior:  intact station, gait and muscle tone  Oriented to:  time, person, and place  Attention " Span and Concentration:  Normal  Speech:  clear, coherent  Mood:  better  Affect:  appropriate and in normal range  Associations:  no loose associations  Thought Process:  tangental  Thought Content:  Appropriate to Interview  Recent and Remote Memory:  fair Not formally assessed. No amnesia.  Fund of Knowledge: appropriate  Insight:  good  Judgment:  intact  Impulse Control:  intact     Suicide Risk Assessment:  Today Chelle Noyola denies suicidal ideation or self-harm impulses. Therefore, based on all available evidence including the factors cited above, Chelle Noyola does not appear to be at imminent risk for self-harm, does not meet criteria for a 72-hr hold, and therefore remains appropriate for ongoing outpatient level of care.  A thorough assessment of risk factors related to suicide and self-harm have been reviewed and are noted above. The patient convincingly denies acute suicidality on several occasions. Local community safety resources reviewed and printed for patient to use if needed. There was no deceit detected, and the patient presented in a manner that was believable.      DSM5  Diagnosis:  Attention-Deficit/Hyperactivity Disorder  314.01 (F90.9) Unspecified Attention -Deficit / Hyperactivity Disorder  296.32 (F33.1) Major Depressive Disorder, Recurrent Episode, Moderate _  309.81 (F43.10) Posttraumatic Stress Disorder (includes Posttraumatic Stress Disorder for Children 6 Years and Younger)  With dissociative symptoms      Medical comorbidities include:   Patient Active Problem List    Diagnosis Date Noted     Elevated BP without diagnosis of hypertension 04/16/2019     Priority: Medium     Generalized anxiety disorder 04/15/2019     Priority: Medium     PTSD (post-traumatic stress disorder) 04/15/2019     Priority: Medium     ADHD, adult residual type 03/11/2019     Priority: Medium     Anxiety 03/11/2019     Priority: Medium     Migraine without aura and without status migrainosus, not intractable  03/11/2019     Priority: Medium         Assessment:  Chelle Noyola reports having a good response to Prozac -- I believe we can continue with this at 20 mg daily. She is having an improved response to Concerta. I did offer to optimize her dosage at 27 mg daily, but she'd prefer to stick with higher 36 mg daily and try that out a few more weeks. We did previously discuss lowering her Lamictal down -- as she's finding good benefit with Prozac, we can proceed with this and monitor for effects. She has been utilizing lower dosage dosage of Clonidine, so we can continue 0.1 mg up to twice daily, though she's advised to continue to be fairly consistent with the dosing to avoid any rebound hypertension.    I think psychotherapy is advisable, but she is deferring on this presently.    Medication side effects and alternatives were reviewed. Health promotion activities recommended and reviewed today. All questions addressed. Education and counseling completed regarding risks and benefits of medications and psychotherapy options.    Treatment Plan:    Continue Prozac 20 mg daily    Continue Concerta 36 mg daily    Decrease Lamictal to 50 mg daily    Continue Clonidine 0.1 mg in AM, and an additional 0.1 mg as needed in PM    Continue all other medical directions per primary care provider.     Continue all other medications as reviewed per electronic medical record today.     Safety plan reviewed. To the Emergency Department as needed or call after hours crisis line at [phone] or [phone]. [place] Crisis Text Line: Text [place] to 062884  or  Suicide LifeLine Chat: suicidepreventionlifeline.org/chat/    Schedule an appointment with me in 4-6 weeks or sooner as needed.  Call [facility] at [phone] to schedule.    Follow up with primary care provider as planned or for acute medical concerns.    Call the psychiatric nurse line with medication questions or concerns at [phone].    [employer] may be used to  communicate with your provider, but this is not intended to be used for emergencies.      Administrative Billing:   Time spent with patient was 30 minutes and greater than 50% of time or 20 minutes was spent in counseling and coordination of care regarding above diagnoses and treatment plan.    Patient Status:  Patient will continue to be seen for ongoing consultation and stabilization.    Signed:   Yovanny Lopez CNP   Psychiatry

## 2019-06-10 NOTE — PATIENT INSTRUCTIONS
-- Continue Prozac at 20 mg daily    -- Decrease Lamictal to 50 mg (1/2 tab daily)    -- Continue Concerta 36 mg daily    -- May take Clonidine 0.1 mg (1 tab) up to twice daily    -- See me in 6 weeks

## 2019-06-11 ASSESSMENT — PATIENT HEALTH QUESTIONNAIRE - PHQ9: SUM OF ALL RESPONSES TO PHQ QUESTIONS 1-9: 8

## 2019-06-11 ASSESSMENT — ANXIETY QUESTIONNAIRES: GAD7 TOTAL SCORE: 7

## 2019-06-19 ENCOUNTER — TELEPHONE (OUTPATIENT)
Dept: PSYCHIATRY | Facility: CLINIC | Age: 34
End: 2019-06-19

## 2019-06-19 DIAGNOSIS — F41.9 ANXIETY: Primary | ICD-10-CM

## 2019-06-19 NOTE — TELEPHONE ENCOUNTER
Reason for call:  Patient reporting a symptom    Symptom or request: Anxiety    Duration (how long have symptoms been present): 4 days     Have you been treated for this before? Yes    Additional comments: pt sates that she had a near-death experience recently and is very anxious as a result. She is hoping to get some medication to ease her symptoms.     Pharmacy of Choice: selected     Phone Number patient can be reached at:  Home number on file [phone] (home)    Best Time:  anytime    Can we leave a detailed message on this number:  YES    Call taken on 6/19/2019 at 12:13 PM by Diana Urena

## 2019-06-19 NOTE — TELEPHONE ENCOUNTER
Glad she's physically OK at this point. In agreement with pursuit of therapy to attend to her overall anxiety. I don't believe we ever discussed any past Ativan use, so I am not entirely comfortable to prescribe this at this time. I would instead suggest she dial back up her Clonidine temporarily to 0.2 mg BID for a few days to 1 week until she feels her anxiety is more consisently under control. She should have a supply to allow for this.

## 2019-06-19 NOTE — TELEPHONE ENCOUNTER
"Last ov was 6/10/19.  No future ov at this time.     I spoke to Chelle. She stated she was hospitalized recently in [place] for low Na+, \"drinking too much water.\" The [facility] incident was June 14 th-15 th. She was in the ER for about 9 hours and released. She initially felt \"ok\" about it, but has been perseverating about \"how close to death they said I was.\" She reports feeling \"nevous and shaky all the time.\" She mentioned Ativan has been helpful but is open to whatever medication Yovanny thinks is appropriate.     I suggest she at least consider psychotherapy now (declined offer at her last appt). Patient agreed and accepted the number and referral to [facility].        checked:  Fill Date Written  Drug     Qty Days Prescriber   06/10/2019 06/10/2019 Methylphenidate Er 36 Mg Tab 30 30 Da [facility]   05/20/2019 05/16/2019 Methylphenidate Er 18 Mg Tab  30 30 Da [facility]   04/10/2019 04/09/2019 Dextroamp-Amphet Er 10 Mg Cap  30 30 [address]      "

## 2019-06-19 NOTE — TELEPHONE ENCOUNTER
I spoke to Chelle and relayed recommendation from Yovanny Lopez CNP. She agreed to the plan and will update him PRN.

## 2019-06-24 ENCOUNTER — OFFICE VISIT (OUTPATIENT)
Dept: URGENT CARE | Facility: URGENT CARE | Age: 34
End: 2019-06-24
Payer: COMMERCIAL

## 2019-06-24 ENCOUNTER — HOSPITAL ENCOUNTER (EMERGENCY)
Facility: CLINIC | Age: 34
Discharge: HOME | End: 2019-06-25
Attending: EMERGENCY MEDICINE | Admitting: EMERGENCY MEDICINE
Payer: COMMERCIAL

## 2019-06-24 VITALS
HEART RATE: 83 BPM | SYSTOLIC BLOOD PRESSURE: 140 MMHG | DIASTOLIC BLOOD PRESSURE: 82 MMHG | TEMPERATURE: 98.2 F | OXYGEN SATURATION: 99 %

## 2019-06-24 DIAGNOSIS — F41.9 ANXIETY: ICD-10-CM

## 2019-06-24 DIAGNOSIS — E87.6 LOW SERUM POTASSIUM: ICD-10-CM

## 2019-06-24 DIAGNOSIS — E87.1 HYPONATREMIA: ICD-10-CM

## 2019-06-24 DIAGNOSIS — F41.9 ANXIETY: Primary | ICD-10-CM

## 2019-06-24 LAB
ANION GAP SERPL CALCULATED.3IONS-SCNC: 7 MMOL/L (ref 3–14)
BASOPHILS # BLD AUTO: 0.1 10E9/L (ref 0–0.2)
BASOPHILS NFR BLD AUTO: 0.6 %
BUN SERPL-MCNC: 8 MG/DL (ref 7–30)
CALCIUM SERPL-MCNC: 7.9 MG/DL (ref 8.5–10.1)
CHLORIDE SERPL-SCNC: 101 MMOL/L (ref 94–109)
CO2 SERPL-SCNC: 25 MMOL/L (ref 20–32)
CREAT SERPL-MCNC: 0.74 MG/DL (ref 0.52–1.04)
DIFFERENTIAL METHOD BLD: ABNORMAL
EOSINOPHIL # BLD AUTO: 0.3 10E9/L (ref 0–0.7)
EOSINOPHIL NFR BLD AUTO: 2.9 %
ERYTHROCYTE [DISTWIDTH] IN BLOOD BY AUTOMATED COUNT: 11.7 % (ref 10–15)
GFR SERPL CREATININE-BSD FRML MDRD: >90 ML/MIN/{1.73_M2}
GLUCOSE SERPL-MCNC: 105 MG/DL (ref 70–99)
HCT VFR BLD AUTO: 35.2 % (ref 35–47)
HGB BLD-MCNC: 11.9 G/DL (ref 11.7–15.7)
IMM GRANULOCYTES # BLD: 0 10E9/L (ref 0–0.4)
IMM GRANULOCYTES NFR BLD: 0.4 %
LYMPHOCYTES # BLD AUTO: 2 10E9/L (ref 0.8–5.3)
LYMPHOCYTES NFR BLD AUTO: 20.7 %
MCH RBC QN AUTO: 31.6 PG (ref 26.5–33)
MCHC RBC AUTO-ENTMCNC: 33.8 G/DL (ref 31.5–36.5)
MCV RBC AUTO: 93 FL (ref 78–100)
MONOCYTES # BLD AUTO: 0.9 10E9/L (ref 0–1.3)
MONOCYTES NFR BLD AUTO: 9.4 %
NEUTROPHILS # BLD AUTO: 6.5 10E9/L (ref 1.6–8.3)
NEUTROPHILS NFR BLD AUTO: 66 %
NRBC # BLD AUTO: 0 10*3/UL
NRBC BLD AUTO-RTO: 0 /100
PLATELET # BLD AUTO: 277 10E9/L (ref 150–450)
POTASSIUM SERPL-SCNC: 3.3 MMOL/L (ref 3.4–5.3)
RBC # BLD AUTO: 3.77 10E12/L (ref 3.8–5.2)
SODIUM SERPL-SCNC: 133 MMOL/L (ref 133–144)
WBC # BLD AUTO: 9.8 10E9/L (ref 4–11)

## 2019-06-24 PROCEDURE — 25000128 H RX IP 250 OP 636: Performed by: EMERGENCY MEDICINE

## 2019-06-24 PROCEDURE — 99284 EMERGENCY DEPT VISIT MOD MDM: CPT | Mod: 25

## 2019-06-24 PROCEDURE — 93005 ELECTROCARDIOGRAM TRACING: CPT

## 2019-06-24 PROCEDURE — 36415 COLL VENOUS BLD VENIPUNCTURE: CPT | Performed by: FAMILY MEDICINE

## 2019-06-24 PROCEDURE — 96360 HYDRATION IV INFUSION INIT: CPT

## 2019-06-24 PROCEDURE — 99214 OFFICE O/P EST MOD 30 MIN: CPT | Performed by: FAMILY MEDICINE

## 2019-06-24 PROCEDURE — 85025 COMPLETE CBC W/AUTO DIFF WBC: CPT | Performed by: EMERGENCY MEDICINE

## 2019-06-24 PROCEDURE — 80048 BASIC METABOLIC PNL TOTAL CA: CPT | Performed by: EMERGENCY MEDICINE

## 2019-06-24 PROCEDURE — 80048 BASIC METABOLIC PNL TOTAL CA: CPT | Performed by: FAMILY MEDICINE

## 2019-06-24 RX ORDER — SODIUM CHLORIDE 9 MG/ML
1000 INJECTION, SOLUTION INTRAVENOUS CONTINUOUS
Status: DISCONTINUED | OUTPATIENT
Start: 2019-06-24 | End: 2019-06-25 | Stop reason: HOSPADM

## 2019-06-24 RX ORDER — LIDOCAINE 40 MG/G
CREAM TOPICAL
Status: DISCONTINUED | OUTPATIENT
Start: 2019-06-24 | End: 2019-06-25 | Stop reason: HOSPADM

## 2019-06-24 RX ADMIN — SODIUM CHLORIDE 1000 ML: 9 INJECTION, SOLUTION INTRAVENOUS at 23:08

## 2019-06-24 ASSESSMENT — ENCOUNTER SYMPTOMS
NERVOUS/ANXIOUS: 1
NECK PAIN: 1
SHORTNESS OF BREATH: 1

## 2019-06-24 NOTE — ED AVS SNAPSHOT
[facility] Emergency Department  [address]  [facility] [address]  Phone:  [phone]  Fax:  [phone]                                    Chelle Noyola   MRN: [id number]    Department:  [facility] Emergency Department   Date of Visit:  6/24/2019           After Visit Summary Signature Page    I have received my discharge instructions, and my questions have been answered. I have discussed any challenges I see with this plan with the nurse or doctor.    ..........................................................................................................................................  Patient/Patient Representative Signature      ..........................................................................................................................................  Patient Representative Print Name and Relationship to Patient    ..................................................               ................................................  Date                                   Time    ..........................................................................................................................................  Reviewed by Signature/Title    ...................................................              ..............................................  Date                                               Time          22EPIC Rev 08/18

## 2019-06-25 VITALS
RESPIRATION RATE: 15 BRPM | OXYGEN SATURATION: 99 % | SYSTOLIC BLOOD PRESSURE: 130 MMHG | TEMPERATURE: 98.7 F | HEART RATE: 52 BPM | DIASTOLIC BLOOD PRESSURE: 82 MMHG

## 2019-06-25 LAB
ANION GAP SERPL CALCULATED.3IONS-SCNC: 6 MMOL/L (ref 3–14)
BUN SERPL-MCNC: 6 MG/DL (ref 7–30)
CALCIUM SERPL-MCNC: 8.8 MG/DL (ref 8.5–10.1)
CHLORIDE SERPL-SCNC: 100 MMOL/L (ref 94–109)
CO2 SERPL-SCNC: 26 MMOL/L (ref 20–32)
CREAT SERPL-MCNC: 0.79 MG/DL (ref 0.52–1.04)
GFR SERPL CREATININE-BSD FRML MDRD: >90 ML/MIN/{1.73_M2}
GLUCOSE SERPL-MCNC: 128 MG/DL (ref 70–99)
INTERPRETATION ECG - MUSE: NORMAL
POTASSIUM SERPL-SCNC: 3.8 MMOL/L (ref 3.4–5.3)
SODIUM SERPL-SCNC: 132 MMOL/L (ref 133–144)

## 2019-06-25 PROCEDURE — 25000132 ZZH RX MED GY IP 250 OP 250 PS 637: Performed by: EMERGENCY MEDICINE

## 2019-06-25 RX ORDER — POTASSIUM CHLORIDE 1.5 G/1.58G
20 POWDER, FOR SOLUTION ORAL ONCE
Status: COMPLETED | OUTPATIENT
Start: 2019-06-25 | End: 2019-06-25

## 2019-06-25 RX ORDER — HYDROXYZINE HYDROCHLORIDE 25 MG/1
25 TABLET, FILM COATED ORAL 3 TIMES DAILY PRN
Qty: 10 TABLET | Refills: 0 | Status: SHIPPED | OUTPATIENT
Start: 2019-06-25 | End: 2019-08-01

## 2019-06-25 RX ADMIN — POTASSIUM CHLORIDE 20 MEQ: 1.5 POWDER, FOR SOLUTION ORAL at 00:50

## 2019-06-25 NOTE — ED TRIAGE NOTES
"In [facility] last week ED with low sodium, was given IV fluids and discharge. Since then, have been having a lot of anxiety. Psychiatrist did increased clonidine dose, but not helping. \"Jumping out of skin,\" dizzy, and bilateral neck/shoulder pain. ABCs intact.   "

## 2019-06-25 NOTE — PROGRESS NOTES
Subjective:   Chelle Noyola is a 34 year old female who presents for   Chief Complaint   Patient presents with     Urgent Care     Anxiety     Possible panic attack x1 week, Had been seen at ER in [place] due to Hyponatremia on 6/15, experiencing panic attack post visit to ER. Sx- Cx tightness, weakness, hot flashes, dizzy.     She reports being in [place] last week  - she was in the hospital and apparently they said she had diluted herself from drinking too much water. She was managed at an outside clinic initially until her blood work showed low sodium and she was transferred by ambulance. At the time she had presented with neck stiffness, dizziness, sick to stomach, confused, disoriented,  In addition to vomiting, and panicking.     At this current time she denies any vomiting. She has a little bit of dizziness.     No hx of diabetes insipidus. No hx of hyponatremia.    Since the episode she has been drinking more gatorade and avoiding straight water.   Reports she has hx of panic attacks, feels better than she did earlier this evening. Asked a friend to come pick her up but no one opted to come to her aid, she is irritated by this. Since the incident she has had increased anxiety, she was recommended to take clonidine for this by her psychiatrist. Today she is feeling a little bit of chest tightness but no shortness of breath. No pain radiating to her left arm.      FH: denies early heart attack or heart problems (other than her uncle).     Patient Active Problem List    Diagnosis Date Noted     Elevated BP without diagnosis of hypertension 04/16/2019     Priority: Medium     Generalized anxiety disorder 04/15/2019     Priority: Medium     PTSD (post-traumatic stress disorder) 04/15/2019     Priority: Medium     ADHD, adult residual type 03/11/2019     Priority: Medium     Anxiety 03/11/2019     Priority: Medium     Migraine without aura and without status migrainosus, not intractable 03/11/2019     Priority:  Medium       Current Outpatient Medications   Medication     cloNIDine (CATAPRES) 0.1 MG tablet     FLUoxetine (PROZAC) 20 MG capsule     lamoTRIgine (LAMICTAL) 100 MG tablet     methylphenidate (CONCERTA) 36 MG CR tablet     No current facility-administered medications for this visit.      ROS:  As above per HPI    Objective:   /82 (BP Location: Right arm, Patient Position: Chair, Cuff Size: Adult Regular)   Pulse 83   Temp 98.2  F (36.8  C) (Oral)   SpO2 99% , There is no height or weight on file to calculate BMI.  Gen:  NAD, well-nourished, sitting in chair comfortably  HEENT: EOMI, sclera anicteric, Head normocephalic, ; nares patent; mosit mucous membranes  Neck: trachea midline, no thyromegaly  CV:  Hemodynamically stable, RRR  Pulm:  no increased work of breathing , CTAB, no wheezes/rales/rhonchi   ABD: soft, non-distended  MSK: 5/5 strength of upper and lower extremities  Gait: stable  Extrem: no cyanosis, edema or clubbing  Skin: no obvious rashes or abnormalities  Psych: Euthymic, linear thoughts, normal rate of speech, makes good eye contact, mildly anxious    Assessment & Plan:   Chelle Noyola, 34 year old female who presents with:    Anxiety  Recent episode of Hyponatremia    - Basic metabolic panel  (Ca, Cl, CO2, Creat, Gluc, K, Na, BUN)    Patient declined to go to ED despite my recommendation as she feel her symptoms are not severe enough and thinks this is mostly anxiety related. She feels much better now than she did compared to previous when she was in [place]. Feels that her anxiety has come down quite a bit over the last hour or so. Feels comfortable driving herself as she just did 10 minutes to [place]. I discussed that taking uber/ride-sharing bon is an option if she is unable to find a ride to the hospital. She has no chest pain at this time, no risk factors for early heart attack.    For our knowledge and because she was recently evaluated for it, we will check her sodium levels.  The  discussed with central lab who said it cannot be processed tomorrow, I discussed this with patient. Again, she feels as she has been following instructions with hydration and her symptoms are not remotely like before that she did not need evaluation in the emergency department. She was encouraged to continue drinking gatorade and avoid free water.     Jim Epps MD   [facility]    The use of Dragon/Healarium dictation services may have been used to construct the content in this note; any grammatical or spelling errors are non-intentional. Please contact the author of this note directly if you are in need of any clarification.

## 2019-06-25 NOTE — PATIENT INSTRUCTIONS
If you have worsening dizziness or develop muscle weakness, worsening fatigue, confusion, abdominal pain/vomiting please go to the emergency room to be evaluated      Avoid regular water. Continue the gatorade and electrolyte containing drinks      We will call you tomorrow with the results of your blood work sometime in the afternoon

## 2019-06-25 NOTE — ED PROVIDER NOTES
"  History     Chief Complaint:  Anxiety    HPI   Chelle Noyola is a 34 year old female with a history of anxiety who presents to the ED for the evaluation of anxiety. The patient reports that last week she was seen in the ER in [place], was found to have a low sodium, was treated with IV fluids, and was discharged home. She states that ever since this ER visit she has been experiencing increased anxiety and panic attacks and that tonight \"she wanted to jump out of her skin,\" prompting her to the ED. She notes that tonight she started to experience neck pain and shortness of breath accompanied with her anxiety. She states that her clonidine dose has been increased by her Psychiatrist since her return from [place], but notes that this has not been helping her anxiety. She also notes that before her presentation to the ER in [facility], that she was experiencing dizziness, anxiety, and vomiting, and was feeling very disorientated. The patient denies chest pain and other issues.      CARDIAC RISK FACTORS:  Sex:    Female  Tobacco:   Current Some Day Smoker  Hypertension:   No  Hyperlipidemia:  No  Diabetes:   No  Family History:  No    PE/DVT RISK FACTORS:  Sex:    Female  Hormones:   No  Tobacco:   Current Some Day Smoker  Cancer:   No  Travel:   Yes  Surgery:   No  Other immobilization: No  Personal history:  No  Family history:  No     Allergies:  No known drug allergies     Medications:    Catapres  Prozac  Lamictal  Concerta   Clonidine     Past Medical History:    Anxiety  PTSD  ADHD  Migraine     Past Surgical History:    History reviewed. No pertinent surgical history.     Family History:    History reviewed. No pertinent family history.      Social History:  Smoking status: Current Some Day Smoker  Alcohol use: Yes   Marital Status:   [2]     Review of Systems   Respiratory: Positive for shortness of breath.    Cardiovascular: Negative for chest pain.   Musculoskeletal: Positive for neck pain. "   Psychiatric/Behavioral: The patient is nervous/anxious.    All other systems reviewed and are negative.        Physical Exam     Patient Vitals for the past 24 hrs:   BP Temp Temp src Pulse Heart Rate Resp SpO2   06/24/19 2238 (!) 137/91 98.7  F (37.1  C) Oral 83 83 18 100 %        Physical Exam  General: The patient is alert, in no respiratory distress.    HENT: Mucous membranes moist.    Cardiovascular: Regular rate and rhythm. Good pulses in all four extremities. Normal capillary refill and skin turgor.     Respiratory: Lungs are clear. No nasal flaring. No retractions. No wheezing, no crackles.    Gastrointestinal: Abdomen soft. No guarding, no rebound. No palpable hernias.     Musculoskeletal: No gross deformity.     Skin: No rashes or petechiae.     Neurologic: The patient is alert and oriented x3. GCS 15. No testable cranial nerve deficit. Follows commands with clear and appropriate speech. Gives appropriate answers. Good strength in all extremities. No gross neurologic deficit. Gross sensation intact. Pupils are round and reactive. No meningismus.     Lymphatic: No cervical adenopathy. No lower extremity swelling.    Psychiatric: The patient is non-tearful.    Emergency Department Course   ECG (23:14:48):  Rate 67 bpm. TX interval 144. QRS duration 94. QT/QTc 434/458. P-R-T axes 62 44 21. Normal sinus rhythm. Normal ECG.  Interpreted at 2315 by Neal Sandy MD.     Laboratory:  CBC: WNL (WBC 9.8, HGB 11.9, )  BMP: Potassium 3.3 (L), Glucose 105 (H), WNL (Creatinine 0.74)     Interventions:  2308, NS 1L IV Bolus   Potassium chloride, 20 mEq, IV    Emergency Department Course:  Past medical records, nursing notes, and vitals reviewed.  2248: I performed an exam of the patient and obtained history, as documented above.     IV inserted and blood drawn.    2356: I rechecked the patient. Explained findings to patient.     Patient signed out to my ED associate, Dr. Molina.     Impression & Plan     Medical Decision Making:  The patient said that she had been in [place] recently and been in the ER with similar episode. She reports anxiety, fast heart rate, and not feeling well. [facility]. The blood work they had done there had not returned results and she did want blood work here. We discussed the possibility of PE and the patient did decline a d dimer. She is otherwise stable here. Her potassium is slightly low and she will be evaluated by DEC, but she was signed out to Dr. Diaz and the patient will likely further out patient follow up, but appears stable. I do not likely think that this is an arhythmia, cardiac event, or life threatening event.     Diagnosis:  1. Anxiety    2. Low serum potassium          Disposition:  Patient signed out to my ED associate, Dr. Diaz.     Scribe Disclosure:  I, Ji Franklin, am serving as a scribe at 10:44 PM on 6/24/2019 to document services personally performed by Neal Sandy MD based on my observations and the provider's statements to me.      Ji Franklin  6/24/2019   [facility] EMERGENCY DEPARTMENT       Neal Sandy MD  07/04/19 [id number]

## 2019-07-01 ENCOUNTER — OFFICE VISIT (OUTPATIENT)
Dept: URGENT CARE | Facility: URGENT CARE | Age: 34
End: 2019-07-01
Payer: COMMERCIAL

## 2019-07-01 VITALS
HEART RATE: 77 BPM | TEMPERATURE: 98.3 F | OXYGEN SATURATION: 96 % | DIASTOLIC BLOOD PRESSURE: 70 MMHG | SYSTOLIC BLOOD PRESSURE: 120 MMHG

## 2019-07-01 DIAGNOSIS — R07.0 THROAT PAIN: Primary | ICD-10-CM

## 2019-07-01 LAB
DEPRECATED S PYO AG THROAT QL EIA: NORMAL
SPECIMEN SOURCE: NORMAL

## 2019-07-01 PROCEDURE — 87880 STREP A ASSAY W/OPTIC: CPT | Performed by: FAMILY MEDICINE

## 2019-07-01 PROCEDURE — 87081 CULTURE SCREEN ONLY: CPT | Performed by: FAMILY MEDICINE

## 2019-07-01 PROCEDURE — 99213 OFFICE O/P EST LOW 20 MIN: CPT | Performed by: FAMILY MEDICINE

## 2019-07-01 NOTE — PROGRESS NOTES
Subjective:   Chelle Noyola is a 34 year old female who presents for   Chief Complaint   Patient presents with     Urgent Care     URI     Possible swollen thraot and bilateral ear pain x2 weeks     Has had issues with slight neck discomfort radiating from her throat/tonsil area.   She denies any fevers. Denies congestion/runny nose/cough. No discomfort with swallowing.     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 She does see a chiropractor for this intermittently.     Meds attempted: none      Patient Active Problem List    Diagnosis Date Noted     Elevated BP without diagnosis of hypertension 04/16/2019     Priority: Medium     Generalized anxiety disorder 04/15/2019     Priority: Medium     PTSD (post-traumatic stress disorder) 04/15/2019     Priority: Medium     ADHD, adult residual type 03/11/2019     Priority: Medium     Anxiety 03/11/2019     Priority: Medium     Migraine without aura and without status migrainosus, not intractable 03/11/2019     Priority: Medium       Current Outpatient Medications   Medication     cloNIDine (CATAPRES) 0.1 MG tablet     FLUoxetine (PROZAC) 20 MG capsule     hydrOXYzine (ATARAX) 25 MG tablet     lamoTRIgine (LAMICTAL) 100 MG tablet     methylphenidate (CONCERTA) 36 MG CR tablet     No current facility-administered medications for this visit.        ROS:  As above per HPI    Objective:   /70 (BP Location: Right arm, Patient Position: Chair, Cuff Size: Adult Regular)   Pulse 77   Temp 98.3  F (36.8  C) (Oral)   SpO2 96% , There is no height or weight on file to calculate BMI.  Gen:  NAD, well-nourished, sitting in chair comfortably  HEENT: EOMI, sclera anicteric, Head normocephalic, ; nares patent; moist mucous membranes, subtle click bilaterally at TMJ with opening/closing mouth, normal TM's bilaterally, tonsils 2+ without exudates, no trismus  Neck: trachea midline, no thyromegaly, one swollen lymph node of the R anterior cervical chain  adjacent to mandible  CV:  Hemodynamically stable, RRR  Pulm:  no increased work of breathing , CTAB, no wheezes/rales/rhonchi   Extrem: no cyanosis, edema or clubbing  Skin: no obvious rashes or abnormalities  Psych: Euthymic, linear thoughts, normal rate of speech    Results for orders placed or performed in visit on 07/01/19   Rapid strep screen   Result Value Ref Range    Specimen Description Throat     Rapid Strep A Screen       NEGATIVE: No Group A streptococcal antigen detected by immunoassay, await culture report.     Assessment & Plan:   Chelle Noyola, 34 year old female who presents with:  Throat pain  Throat discomfort radiating to ears bilaterally. No evidence of oral or tympanic infection. Mild presentation of discomfort. Recommended f/u if worsening symptoms, reassurance provided.   - Rapid strep screen  - Beta strep group A culture      Jim Epps MD   [place] [facility]    The use of Dragon/[employer] dictation services may have been used to construct the content in this note; any grammatical or spelling errors are non-intentional. Please contact the author of this note directly if you are in need of any clarification.

## 2019-07-02 LAB
BACTERIA SPEC CULT: NORMAL
SPECIMEN SOURCE: NORMAL

## 2019-07-10 DIAGNOSIS — F90.8 ADHD, ADULT RESIDUAL TYPE: ICD-10-CM

## 2019-07-10 NOTE — TELEPHONE ENCOUNTER
Patient calling in stating that she needs a refill on methylphenidate (CONCERTA) 36 MG CR tablet.    Chante P[employer]or [employer] patient Rep.

## 2019-07-11 NOTE — TELEPHONE ENCOUNTER
Controlled Substance Refill Request for Concerta  Problem List Complete:  No     PROVIDER TO CONSIDER COMPLETION OF PROBLEM LIST AND OVERVIEW/CONTROLLED SUBSTANCE AGREEMENT    Last Written Prescription Date:  06/10/19  Last Fill Quantity: 30,   # refills: 0    THE MOST RECENT OFFICE VISIT MUST BE WITHIN THE PAST 3 MONTHS. AT LEAST ONE FACE TO FACE VISIT MUST OCCUR EVERY 6 MONTHS. ADDITIONAL VISITS CAN BE VIRTUAL.  (THIS STATEMENT SHOULD BE DELETED.)    Last Office Visit with [facility] [place]: Dr Lopez     Future Office visit:     Controlled substance agreement:   Encounter-Level CSA:    There are no encounter-level csa.     Patient-Level CSA:    There are no patient-level csa.         Last Urine Drug Screen: No results found for: CDAUT, No results found for: COMDAT, No results found for: THC13, PCP13, COC13, MAMP13, OPI13, AMP13, BZO13, TCA13, MTD13, BAR13, OXY13, PPX13, BUP13     Processing:  Fax Rx to [facility]     [email or web address]       checked in past 3 months?  No, route to RN

## 2019-07-11 NOTE — TELEPHONE ENCOUNTER
Refill for: methylphenidate (CONCERTA) 36 MG CR tablet    Last Appointment: 6/10/19    Next Appointment: none    No Shows/Cancellations since last appointment: none    Last Refill in Epic (date and amount/how many days):    Disp Refills Start End JUDI   methylphenidate (CONCERTA) 36 MG CR tablet 30 tablet 0 6/10/2019  --   Sig - Route: Take 1 tablet (36 mg) by mouth every morning - Oral      reviewed and summarized below:   Fill Date  Drug      Qty  Days  Prescriber   06/10/2019 Methylphenidate Er 36 Mg Tab  30  30  Da Mca  05/20/2019  Methylphenidate Er 18 Mg Tab  30  30  Da Mca       Last office visit note reviewed and summarized below:  Treatment Plan:    Continue Prozac 20 mg daily    Continue Concerta 36 mg daily    Decrease Lamictal to 50 mg daily    Continue Clonidine 0.1 mg in AM, and an additional 0.1 mg as needed in PM    Continue all other medical directions per primary care provider.     Continue all other medications as reviewed per electronic medical record today     Schedule an appointment with me in 4-6 weeks or sooner as needed  Patient Status:  Patient will continue to be seen for ongoing consultation and stabilization    Medication pended and routed to provider.      Areli Landaverde RN  07/11/19  9:41 AM

## 2019-07-11 NOTE — TELEPHONE ENCOUNTER
Patient needs to schedule a follow up with Yovanny.   I can fill, but Patient will need to fill the RX at the [facility] if they want it today.  Otherwise, will need to wait for Yovanny to be back in clinic tomorrow to send in RX.

## 2019-07-11 NOTE — TELEPHONE ENCOUNTER
M for patient to return call to clinic.    If patient calls back:     Please schedule a follow up with Yovanny for her    Ask if she needs Rx today-- if so will need to go to [facility] to  medication.    Areli Landaverde RN  07/11/19  10:06 AM

## 2019-07-12 RX ORDER — METHYLPHENIDATE HYDROCHLORIDE 36 MG/1
36 TABLET ORAL EVERY MORNING
Qty: 30 TABLET | Refills: 0 | Status: SHIPPED | OUTPATIENT
Start: 2019-07-12 | End: 2019-09-09

## 2019-07-12 NOTE — TELEPHONE ENCOUNTER
Concerta Rx printed and will be available for pick-up at [facility] .    She should schedule a follow-up with me. She was recently in the ED for a panic episode. She's not yet in therapy, and should be encouraged to look into this option. If she needs a BHP referral, do feel free to order.

## 2019-07-12 NOTE — TELEPHONE ENCOUNTER
DOMM for patient to return call to clinic.    If patient calls back:    Please schedule a follow up with Yovanny    Ask if she needs a referral for P      Areli Landavedre RN  07/12/19  9:34 AM

## 2019-07-17 ENCOUNTER — TELEPHONE (OUTPATIENT)
Dept: BEHAVIORAL HEALTH | Facility: CLINIC | Age: 34
End: 2019-07-17

## 2019-07-17 ENCOUNTER — TELEPHONE (OUTPATIENT)
Dept: PSYCHIATRY | Facility: CLINIC | Age: 34
End: 2019-07-17

## 2019-07-17 NOTE — TELEPHONE ENCOUNTER
Reason for Call:  Other prescription    Detailed comments: Pt called to say she got her Concerta from another provider, you may discontinue it    Phone Number Patient can be reached at: [phone]  Best Time:   Can we leave a detailed message on this number? Yes if needed    Call taken on 7/17/2019 at 9:20 AM by Tania Caldera

## 2019-08-01 ENCOUNTER — OFFICE VISIT (OUTPATIENT)
Dept: PSYCHIATRY | Facility: CLINIC | Age: 34
End: 2019-08-01
Payer: COMMERCIAL

## 2019-08-01 VITALS
DIASTOLIC BLOOD PRESSURE: 89 MMHG | OXYGEN SATURATION: 100 % | HEART RATE: 78 BPM | BODY MASS INDEX: 23.65 KG/M2 | SYSTOLIC BLOOD PRESSURE: 134 MMHG | WEIGHT: 151 LBS | RESPIRATION RATE: 12 BRPM

## 2019-08-01 DIAGNOSIS — F43.10 PTSD (POST-TRAUMATIC STRESS DISORDER): Primary | ICD-10-CM

## 2019-08-01 DIAGNOSIS — F41.1 GENERALIZED ANXIETY DISORDER: ICD-10-CM

## 2019-08-01 DIAGNOSIS — F90.8 ADHD, ADULT RESIDUAL TYPE: ICD-10-CM

## 2019-08-01 PROCEDURE — 99214 OFFICE O/P EST MOD 30 MIN: CPT | Performed by: NURSE PRACTITIONER

## 2019-08-01 RX ORDER — CLONIDINE HYDROCHLORIDE 0.1 MG/1
0.1 TABLET ORAL 2 TIMES DAILY
Qty: 60 TABLET | Refills: 1 | Status: SHIPPED | OUTPATIENT
Start: 2019-08-01 | End: 2019-11-18

## 2019-08-01 RX ORDER — LAMOTRIGINE 200 MG/1
200 TABLET ORAL 2 TIMES DAILY
Qty: 60 TABLET | Refills: 1 | Status: SHIPPED | OUTPATIENT
Start: 2019-08-01 | End: 2019-09-30

## 2019-08-01 RX ORDER — LAMOTRIGINE 200 MG/1
TABLET ORAL
COMMUNITY
Start: 2019-07-18 | End: 2019-08-01

## 2019-08-01 RX ORDER — CLONIDINE HYDROCHLORIDE 0.2 MG/1
TABLET ORAL
COMMUNITY
Start: 2019-07-23 | End: 2019-08-01

## 2019-08-01 RX ORDER — OLANZAPINE 5 MG/1
TABLET ORAL
COMMUNITY
Start: 2019-07-23 | End: 2019-08-01

## 2019-08-01 RX ORDER — FLUOXETINE 40 MG/1
CAPSULE ORAL
COMMUNITY
Start: 2019-07-23 | End: 2019-08-01

## 2019-08-01 RX ORDER — FLUOXETINE 40 MG/1
CAPSULE ORAL
Qty: 30 CAPSULE | Refills: 1 | Status: SHIPPED | OUTPATIENT
Start: 2019-08-01 | End: 2019-09-30

## 2019-08-01 RX ORDER — CLONAZEPAM 0.5 MG/1
TABLET ORAL
Qty: 30 TABLET | Refills: 0 | Status: SHIPPED | OUTPATIENT
Start: 2019-08-01 | End: 2019-11-18

## 2019-08-01 ASSESSMENT — ANXIETY QUESTIONNAIRES
6. BECOMING EASILY ANNOYED OR IRRITABLE: NEARLY EVERY DAY
3. WORRYING TOO MUCH ABOUT DIFFERENT THINGS: NEARLY EVERY DAY
1. FEELING NERVOUS, ANXIOUS, OR ON EDGE: NEARLY EVERY DAY
7. FEELING AFRAID AS IF SOMETHING AWFUL MIGHT HAPPEN: NEARLY EVERY DAY
7. FEELING AFRAID AS IF SOMETHING AWFUL MIGHT HAPPEN: NEARLY EVERY DAY
4. TROUBLE RELAXING: NEARLY EVERY DAY
GAD7 TOTAL SCORE: 21
GAD7 TOTAL SCORE: 21
5. BEING SO RESTLESS THAT IT IS HARD TO SIT STILL: NEARLY EVERY DAY
GAD7 TOTAL SCORE: 21
2. NOT BEING ABLE TO STOP OR CONTROL WORRYING: NEARLY EVERY DAY

## 2019-08-01 ASSESSMENT — PATIENT HEALTH QUESTIONNAIRE - PHQ9
SUM OF ALL RESPONSES TO PHQ QUESTIONS 1-9: 19
SUM OF ALL RESPONSES TO PHQ QUESTIONS 1-9: 19
10. IF YOU CHECKED OFF ANY PROBLEMS, HOW DIFFICULT HAVE THESE PROBLEMS MADE IT FOR YOU TO DO YOUR WORK, TAKE CARE OF THINGS AT HOME, OR GET ALONG WITH OTHER PEOPLE: VERY DIFFICULT

## 2019-08-01 NOTE — PATIENT INSTRUCTIONS
-- Stop Hydroxyzine    -- Stop Concerta, for the time being    -- Decrease Clonidine down to 0.1 mg three times daily, for a few days, then down to 0.1 mg twice daily.    -- Increase Prozac to 60 mg daily (40 mg pill + 20 mg pill)    -- Continue Lamictal 200 mg twice daily    -- May take Klonopin 0.5 mg (1 tablet) daily for anxiety. Don't drive under effect. Lasts 8-10 hours. You can take daily for two weeks, but then see if can not take, or just use quite occasionally.
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
## 2019-08-02 ASSESSMENT — ANXIETY QUESTIONNAIRES: GAD7 TOTAL SCORE: 21

## 2019-08-02 ASSESSMENT — PATIENT HEALTH QUESTIONNAIRE - PHQ9: SUM OF ALL RESPONSES TO PHQ QUESTIONS 1-9: 19

## 2019-08-09 ENCOUNTER — TELEPHONE (OUTPATIENT)
Dept: PSYCHIATRY | Facility: CLINIC | Age: 34
End: 2019-08-09

## 2019-08-09 NOTE — TELEPHONE ENCOUNTER
She can decrease Clonidine to 0.1 mg once a day now (should be down to BID at this point).    She can increase Klonopin to 1 mg daily. She is advised to try to take this no more than another week. Not long term.

## 2019-08-09 NOTE — TELEPHONE ENCOUNTER
Last office visit: 8/1/19  Next office visit: 9/12/19    Last office visit note reviewed and summarized below:  Treatment Plan:    Increase Prozac to 60 mg daily    Decrease Clonidine to 0.1 mg TID for a few days, then 0.1 mg BID    May use Klonopin 0.5 mg daily for 2 weeks, then see if can reduce to occasional use. Do not drive on this medication.    Hold off on Concerta 36 mg daily    Maintain Lamictal 200 mg BID    Continue psychotherapy.    Continue all other medical directions per primary care provider.     Continue all other medications as reviewed per electronic medical record today.     Schedule an appointment with me in 6-8 weeks or sooner as needed  Patient Status:  Patient will continue to be seen for ongoing consultation and stabilization    Spoke with patient-- states that she takes the medication daily and hasn't noticed any difference in her anxiety. Patient states the clonidine makes her mouth really dry so she doesn't like that. Patient states she doesn't know what the normal dose is but is wondering if she can take more.     Routing to provider.    Areli Landaverde RN  08/09/19  2:54 PM

## 2019-08-09 NOTE — TELEPHONE ENCOUNTER
"Not sure specifics of her \"doing her own thing\" with her treatment.    If she is not getting over the hump with anxiety, then Prozac should be increased to 80 mg daily and Buspar introduced. I will not yet advise allowance for Klonopin above 1 mg daily.  "

## 2019-08-09 NOTE — TELEPHONE ENCOUNTER
Reason for Call:  Medication or medication refill:    Do you use a [facility]?  Name of the pharmacy and phone number for the current request:  [facility] [place] [phone]    Name of the medication requested: Clonazepam 0.5     Other request: Pt reports that she feels this medication does nothing and is requesting if she can increase the dosage. Pt also reports not knowing much about it nor it's dosage range.         Can we leave a detailed message on this number? YES    Phone number patient can be reached at: Cell number on file:    Telephone Information:   Mobile [phone]         Call taken on 8/9/2019 at 2:45 PM by Piedad Julien

## 2019-08-09 NOTE — TELEPHONE ENCOUNTER
"Patient informed of message from provider-- does not appear patient is very compliant with recommendations for meds. When writer inquired about medication dosing/frequency patient stated \"I tavo am doing my own thing because I know my body\".     Patient is wondering what a back up plan would be if the 1mg of clonazepam does not work-- she is wondering if she can take a 3rd tab.     Writer offered patient after-hours resources for management of anxiety-- patient stated she is out of town right now so refused.     Will route to provider for advisement on patient's requested back up plan.       Areli Landaverde RN  08/09/19  3:48 PM      "

## 2019-08-28 ENCOUNTER — TELEPHONE (OUTPATIENT)
Dept: PSYCHIATRY | Facility: CLINIC | Age: 34
End: 2019-08-28

## 2019-08-28 NOTE — TELEPHONE ENCOUNTER
I attempted to call Chelle to discuss. She was given a one month supply of Clonazepam on 8/2/19. She was given the ok to increase to 1 mg total per day, but was not to continue taking it beyond mid August.     I left a vm advising she contact her PCP to discuss treatments for headaches or any other medical problem. She may keep her appointment with Yovanny to discuss her depression and anxiety medications. See last communication from patient on 8/9/19 for more context.     9/12/19 ov was rescheduled.Next ov 9/30/19.     reviewed:  Fill Date Drug      Qty Days Prescriber  08/07/2019 Methylphenidate Er 36 Mg Tab 30.00 30 Da [facility]   08/02/2019 Clonazepam 0.5 Mg Tablet      30.00 30 Da [facility]

## 2019-08-28 NOTE — TELEPHONE ENCOUNTER
Reason for Call:  Medication or medication refill:    Do you use a [facility]?  Name of the pharmacy and phone number for the current request:  [facility] [place] [phone]    Name of the medication requested: Clonazepam     Other request: Patient has a question regarding Clonazepam medication and if it has anything to do with relaxing muscles. Patient is no longer on that medication, but communicated that she had headaches frequently except for during the month she was on Clonazepam.    Patient wants to speak with someone regarding another method to potentially help with her headaches as she saw the benefit of not having headaches while on the medication. Please review and contact patient to review.     Writer recommended that the patient get a f/u scheduled. Patient scheduled an appt on 9/30 at 12:45PM. Please review if that is ok or if it should be changed.    Can we leave a detailed message on this number? YES    Phone number patient can be reached at: Cell number on file:    Telephone Information:   Mobile [phone]         Call taken on 8/28/2019 at 1:58 PM by Piedad Julien

## 2019-09-06 DIAGNOSIS — F43.10 PTSD (POST-TRAUMATIC STRESS DISORDER): ICD-10-CM

## 2019-09-06 NOTE — TELEPHONE ENCOUNTER
"Requested Prescriptions   Pending Prescriptions Disp Refills     lamoTRIgine (LAMICTAL) 200 MG tablet 60 tablet 1     Sig: Take 1 tablet (200 mg) by mouth 2 times daily       Last Written Prescription Date: 8/1/19   Last Fill Quantity: 60 tablet,  # refills: 1   Last office visit: 8/1/2019 with prescribing provider:  Nicole   Future Office Visit:   Next 5 appointments (look out 90 days)    Sep 30, 2019 12:45 PM CDT  [facility] with [facility], CNP  [facility] [address] [facility] [id number] [phone]             Anti-Seizure Meds Protocol  Failed - 9/6/2019  2:24 PM        Failed - Review Authorizing provider's last note.      Refer to last progress notes: confirm request is for original authorizing provider (cannot be through other providers).          Failed - Normal CBC on file in past 26 months     Recent Labs   Lab Test 06/24/19  2309   WBC 9.8   RBC 3.77*   HGB 11.9   HCT 35.2                    Failed - Normal ALT or AST on file in past 26 months     No lab results found.  No lab results found.          Passed - Recent (12 mo) or future (30 days) visit within the authorizing provider's specialty     Patient had office visit in the last 12 months or has a visit in the next 30 days with authorizing provider or within the authorizing provider's specialty.  See \"Patient Info\" tab in inbasket, or \"Choose Columns\" in Meds & Orders section of the refill encounter.              Passed - Normal platelet count on file in past 26 months     Recent Labs   Lab Test 06/24/19  2309                  Passed - Medication is active on med list        Passed - No active pregnancy on record        Passed - No positive pregnancy test in last 12 months          "

## 2019-09-09 ENCOUNTER — TELEPHONE (OUTPATIENT)
Dept: PSYCHIATRY | Facility: CLINIC | Age: 34
End: 2019-09-09

## 2019-09-09 DIAGNOSIS — F90.8 ADHD, ADULT RESIDUAL TYPE: Primary | ICD-10-CM

## 2019-09-09 RX ORDER — METHYLPHENIDATE HYDROCHLORIDE 27 MG/1
27 TABLET ORAL EVERY MORNING
Qty: 30 TABLET | Refills: 0 | Status: SHIPPED | OUTPATIENT
Start: 2019-09-09 | End: 2019-09-30

## 2019-09-09 RX ORDER — LAMOTRIGINE 200 MG/1
200 TABLET ORAL 2 TIMES DAILY
Qty: 60 TABLET | Refills: 1 | OUTPATIENT
Start: 2019-09-09

## 2019-09-09 NOTE — TELEPHONE ENCOUNTER
Presuming her anxiety is under better control at this point we can reintroduce Concerta. Previously 18 mg was only partly effective, and while 36 mg was an improvement it also came with some edginess. We'll try 27 mg daily. We'll leave the Rx at the [facility] pharmacy.

## 2019-09-09 NOTE — TELEPHONE ENCOUNTER
Last office visit: 8/1/19  Next office visit: 9/30/19    Last office visit note reviewed and summarized below:  Treatment Plan:    Increase Prozac to 60 mg daily    Decrease Clonidine to 0.1 mg TID for a few days, then 0.1 mg BID    May use Klonopin 0.5 mg daily for 2 weeks, then see if can reduce to occasional use. Do not drive on this medication.    Hold off on Concerta 36 mg daily    Maintain Lamictal 200 mg BID    Continue psychotherapy.    Continue all other medical directions per primary care provider.     Continue all other medications as reviewed per electronic medical record today.       Patient would like to restart Concerta, but at a low dose.   Routing to provider for advisement.     Areli Landaverde RN  09/09/19  1:49 PM

## 2019-09-09 NOTE — TELEPHONE ENCOUNTER
Patient calling stating she is starting Concerta again and would like to try starting with a lower dose.   She states she can pick it up at the [place] [facility] and fill at [facility] pharmacy.

## 2019-09-30 ENCOUNTER — OFFICE VISIT (OUTPATIENT)
Dept: PSYCHIATRY | Facility: CLINIC | Age: 34
End: 2019-09-30
Payer: COMMERCIAL

## 2019-09-30 VITALS
WEIGHT: 153 LBS | SYSTOLIC BLOOD PRESSURE: 131 MMHG | RESPIRATION RATE: 12 BRPM | HEART RATE: 100 BPM | BODY MASS INDEX: 23.96 KG/M2 | DIASTOLIC BLOOD PRESSURE: 84 MMHG | OXYGEN SATURATION: 98 %

## 2019-09-30 DIAGNOSIS — F43.10 PTSD (POST-TRAUMATIC STRESS DISORDER): ICD-10-CM

## 2019-09-30 DIAGNOSIS — F90.8 ADHD, ADULT RESIDUAL TYPE: ICD-10-CM

## 2019-09-30 DIAGNOSIS — F41.1 GENERALIZED ANXIETY DISORDER: Primary | ICD-10-CM

## 2019-09-30 PROCEDURE — 99214 OFFICE O/P EST MOD 30 MIN: CPT | Performed by: NURSE PRACTITIONER

## 2019-09-30 RX ORDER — METHYLPHENIDATE HYDROCHLORIDE 27 MG/1
27 TABLET ORAL EVERY MORNING
Qty: 30 TABLET | Refills: 0 | Status: SHIPPED | OUTPATIENT
Start: 2019-09-30 | End: 2019-09-30

## 2019-09-30 RX ORDER — METHYLPHENIDATE HYDROCHLORIDE 27 MG/1
27 TABLET ORAL EVERY MORNING
Qty: 30 TABLET | Refills: 0 | Status: SHIPPED | OUTPATIENT
Start: 2019-10-30 | End: 2019-11-18

## 2019-09-30 RX ORDER — FLUOXETINE 40 MG/1
CAPSULE ORAL
Qty: 30 CAPSULE | Refills: 2 | Status: SHIPPED | OUTPATIENT
Start: 2019-09-30 | End: 2019-10-23

## 2019-09-30 RX ORDER — LAMOTRIGINE 200 MG/1
200 TABLET ORAL 2 TIMES DAILY
Qty: 60 TABLET | Refills: 2 | Status: SHIPPED | OUTPATIENT
Start: 2019-09-30 | End: 2019-11-18

## 2019-09-30 ASSESSMENT — ANXIETY QUESTIONNAIRES
GAD7 TOTAL SCORE: 7
GAD7 TOTAL SCORE: 7
7. FEELING AFRAID AS IF SOMETHING AWFUL MIGHT HAPPEN: SEVERAL DAYS
7. FEELING AFRAID AS IF SOMETHING AWFUL MIGHT HAPPEN: SEVERAL DAYS
1. FEELING NERVOUS, ANXIOUS, OR ON EDGE: SEVERAL DAYS
GAD7 TOTAL SCORE: 7
3. WORRYING TOO MUCH ABOUT DIFFERENT THINGS: SEVERAL DAYS
6. BECOMING EASILY ANNOYED OR IRRITABLE: SEVERAL DAYS
5. BEING SO RESTLESS THAT IT IS HARD TO SIT STILL: SEVERAL DAYS
4. TROUBLE RELAXING: SEVERAL DAYS
2. NOT BEING ABLE TO STOP OR CONTROL WORRYING: SEVERAL DAYS

## 2019-09-30 ASSESSMENT — PATIENT HEALTH QUESTIONNAIRE - PHQ9
10. IF YOU CHECKED OFF ANY PROBLEMS, HOW DIFFICULT HAVE THESE PROBLEMS MADE IT FOR YOU TO DO YOUR WORK, TAKE CARE OF THINGS AT HOME, OR GET ALONG WITH OTHER PEOPLE: SOMEWHAT DIFFICULT
SUM OF ALL RESPONSES TO PHQ QUESTIONS 1-9: 7
SUM OF ALL RESPONSES TO PHQ QUESTIONS 1-9: 7

## 2019-09-30 NOTE — PROGRESS NOTES
"    Outpatient Psychiatric Progress Note    Name: Chelle Noyola   : 1985                    Primary Care Provider: [facility]   Therapist: [facility]    PHQ-9 scores:  PHQ-9 SCORE 6/10/2019 2019 2019   PHQ-9 Total Score MyChart 8 (Mild depression) 19 (Moderately severe depression) 7 (Mild depression)   PHQ-9 Total Score 8 19 7       ISABEL-7 scores:  ISABEL-7 SCORE 6/10/2019 2019 2019   Total Score 7 (mild anxiety) 21 (severe anxiety) 7 (mild anxiety)   Total Score 7 21 7       Patient Identification:  Patient is a 34 year old year old,   White American female  who presents for return visit with me.  Patient is currently employed full time. Patient attended the session alone. Patient prefers to be called: \"Chelle\".    Interim History:  I last saw Chelle Noyola for outpatient psychiatry Return Visit on 19.     During that appointment, we reviewed her reemergent anxiety bout secondary to episode of dehydration. She had seen a psych NP outside  and had her psychopharm all increased. She was still pretty anxious when we met, so we agreed to increase Prozac to 60 mg daily, and have her hold off on Concerta for time being. We also had her decrease Clonidine as was not tolerating this well. She was given a provision of Klonopin to use short-term. She was to continue psychotherapy.    She later messaged us for more Klonopin, but this was not renewed due to concerns about over-reliance.    She later messaged to request Concereta be renewed, though at more modest dosage of 27 mg daily.    Current medications include:   Current Outpatient Medications   Medication Sig     clonazePAM (KLONOPIN) 0.5 MG tablet 1 tab daily as needed for anxiety     cloNIDine (CATAPRES) 0.1 MG tablet Take 1 tablet (0.1 mg) by mouth 2 times daily     FLUoxetine (PROZAC) 20 MG capsule 1 cap daily. Take with 40 mg daily.     FLUoxetine (PROZAC) 40 MG capsule 1 cap daily. Take with 20 mg daily. "     lamoTRIgine (LAMICTAL) 200 MG tablet Take 1 tablet (200 mg) by mouth 2 times daily     methylphenidate (CONCERTA) 27 MG CR tablet Take 1 tablet (27 mg) by mouth every morning     No current facility-administered medications for this visit.        The [place] Prescription Monitoring Program has been reviewed and there are no concerns about diversionary activity for controlled substances at this time.      I was able to review most recent Primary Care Provider, specialty provider, and therapy visit notes that I have access to.     Today, patient reports she's doing quite well. Anxiety under control again. Has been continuing Lamictal 200 mg BID and Prozac 60 mg daily. Barely using Clonidine anymore. Restarted Concerta 27 mg daily.    States she's feeling emotionally blunted on 60 mg of Prozac. Would like to decrease this back down again. States she is taking Concerta 27 mg occasionally, not daily; feels this is better tolerated than 36 mg daily.    She stopped seeing her therapist; hasn't felt the need to return, but has option to do so if needed.      No past medical history on file.   has no past medical history on file.    Social history updates:  No major updates    Substance use updates:  Minimal EtOH  Denies other substance use  Tobacco: Yes; not looking to quit yet      Vital Signs:   /84 (BP Location: Right arm, Patient Position: Chair, Cuff Size: Adult Regular)   Pulse 100   Resp 12   Wt 69.4 kg (153 lb)   SpO2 98%   BMI 23.96 kg/m      Labs:*  Most recent laboratory results reviewed and no new labs.    Review of Systems:  10 systems (general, cardiovascular, respiratory, eyes, ENT, endocrine, GI, , M/S, neurological) were reviewed. Most pertinent finding(s) is/are: unremarkable.     Mental Status Examination:     Appearance:  awake, alert and adequately groomed  Attitude:  cooperative   Eye Contact:  good  Gait and Station: Normal  Psychomotor Behavior:  restless, jumpy  Oriented  to:  time, person, and place  Attention Span and Concentration: impaired  Speech:  clear, coherent  Mood: better, good  Affect:  appropriate and in normal range  Associations:  no loose associations  Thought Process:  tangental  Thought Content:  Appropriate to Interview  Recent and Remote Memory:  fair Not formally assessed. No amnesia.  Fund of Knowledge: appropriate  Insight:  good  Judgment:  intact  Impulse Control:  intact     Suicide Risk Assessment:  Today Chelle Noyola denies suicidal ideation or self-harm impulses. Therefore, based on all available evidence including the factors cited above, Chelle Noyola does not appear to be at imminent risk for self-harm, does not meet criteria for a 72-hr hold, and therefore remains appropriate for ongoing outpatient level of care.  A thorough assessment of risk factors related to suicide and self-harm have been reviewed and are noted above. The patient convincingly denies acute suicidality on several occasions. Local community safety resources reviewed and printed for patient to use if needed. There was no deceit detected, and the patient presented in a manner that was believable.      DSM5  Diagnosis:  Attention-Deficit/Hyperactivity Disorder  314.01 (F90.9) Unspecified Attention -Deficit / Hyperactivity Disorder  296.32 (F33.1) Major Depressive Disorder, Recurrent Episode, Moderate _  309.81 (F43.10) Posttraumatic Stress Disorder (includes Posttraumatic Stress Disorder for Children 6 Years and Younger)  With dissociative symptoms        Medical comorbidities include:   Patient Active Problem List    Diagnosis Date Noted     Elevated BP without diagnosis of hypertension 04/16/2019     Priority: Medium     Generalized anxiety disorder 04/15/2019     Priority: Medium     PTSD (post-traumatic stress disorder) 04/15/2019     Priority: Medium     ADHD, adult residual type 03/11/2019     Priority: Medium     Anxiety 03/11/2019     Priority: Medium     Migraine without aura and  without status migrainosus, not intractable 03/11/2019     Priority: Medium         Assessment:  Chelle Noyola reports her anxiety has restabilized quite well. Given the emotional blunting of upper dosage of Prozac, she'd like to return this to a more moderate dosage of 40 mg daily. She'll otherwise continue Lamictal 200 mg BID. She is no longer using Klonopin, and is back to use Clonidine 0.1 mg as needed for anxiety, which is presently quite infrequent. She seems to be responding well to Concerta at a more moderate dosage of 27 mg daily.    The patient is aware of my impending departure from [place]. She stated an interest to establish with a long-term psychiatric provider, which seems sensible to me. I have placed a request to [facility] to assist with this referral. She'll follow-up in the meantime with her PCP.        Medication side effects and alternatives were reviewed. Health promotion activities recommended and reviewed today. All questions addressed. Education and counseling completed regarding risks and benefits of medications and psychotherapy options.        Treatment Plan:    Decrease Prozac to 40 mg daily    May use Clonidine 0.1 mg PRN anxiety    Continue Concerta 27 mg daily    Maintain Lamictal 200 mg BID    Continue psychotherapy.    Continue all other medical directions per primary care provider.     Continue all other medications as reviewed per electronic medical record today.     Safety plan reviewed. To the Emergency Department as needed or call after hours crisis line at [phone] or [phone]. [place] Crisis Text [place]: Text [place] to 161961  or  Suicide LifeLine Chat: suicidepreventionlifeline.org/chat/    Referred for community psychiatry via [facility]    Follow up with primary care provider as planned or for acute medical concerns.      Crisis Resources:    National Suicide Prevention Lifeline: 767.101.1070 (TTY: 687.906.5128). Call anytime for help.  (www.suicidepreventionlifeline.org)  National  [place] [employer] Mental Illness (www.coty.org): 627-972-4006 or 567-817-6732.   Mental Health Association (www.mentalhealth.org): 563.644.8505 or 969-460-8261.  [place] Crisis Text [place]: Text [place] to 580315  Suicide LifeLine Chat: suicidepreventionLoiLoline.org/chat      Administrative Billing:   Time spent with patient was 30 minutes and greater than 50% of time or 20 minutes was spent in counseling and coordination of care regarding above diagnoses and treatment plan.    Patient Status:  Patient was referred to community psychiatry for long-term care. May see PCP as needed until established.    Signed:   Yovanny Lopez CNP   Psychiatry

## 2019-10-01 ASSESSMENT — PATIENT HEALTH QUESTIONNAIRE - PHQ9: SUM OF ALL RESPONSES TO PHQ QUESTIONS 1-9: 7

## 2019-10-01 ASSESSMENT — ANXIETY QUESTIONNAIRES: GAD7 TOTAL SCORE: 7

## 2019-10-10 ENCOUNTER — OFFICE VISIT (OUTPATIENT)
Dept: FAMILY MEDICINE | Facility: CLINIC | Age: 34
End: 2019-10-10
Payer: COMMERCIAL

## 2019-10-10 VITALS
HEART RATE: 94 BPM | SYSTOLIC BLOOD PRESSURE: 132 MMHG | WEIGHT: 153 LBS | OXYGEN SATURATION: 99 % | DIASTOLIC BLOOD PRESSURE: 92 MMHG | TEMPERATURE: 98.4 F | RESPIRATION RATE: 16 BRPM | BODY MASS INDEX: 23.96 KG/M2

## 2019-10-10 DIAGNOSIS — M79.622 PAIN IN BOTH UPPER ARMS: ICD-10-CM

## 2019-10-10 DIAGNOSIS — G43.009 MIGRAINE WITHOUT AURA AND WITHOUT STATUS MIGRAINOSUS, NOT INTRACTABLE: ICD-10-CM

## 2019-10-10 DIAGNOSIS — M79.621 PAIN IN BOTH UPPER ARMS: ICD-10-CM

## 2019-10-10 DIAGNOSIS — R03.0 ELEVATED BLOOD PRESSURE READING WITHOUT DIAGNOSIS OF HYPERTENSION: Primary | ICD-10-CM

## 2019-10-10 PROBLEM — F41.9 ANXIETY: Status: RESOLVED | Noted: 2019-03-11 | Resolved: 2019-10-10

## 2019-10-10 PROCEDURE — 99214 OFFICE O/P EST MOD 30 MIN: CPT | Performed by: FAMILY MEDICINE

## 2019-10-10 NOTE — PROGRESS NOTES
"Subjective     Chelle Noyola is a 34 year old female who presents to clinic today for the following health issues:    History of Present Illness        She eats 2-3 servings of fruits and vegetables daily.She consumes 2 sweetened beverage(s) daily.  She is taking medications regularly.     Joint Pain    Onset: 4 years    Description:   Location: left wrist and right wrist  Character: Sharp and Dull ache    Intensity:     Progression of Symptoms: same    Accompanying Signs & Symptoms:  Other symptoms: radiation of pain to elbow and shoulders    History:   Previous similar pain: YES      Precipitating factors:   Trauma or overuse: YES    Alleviating factors:  Improved by: chiropractor and Cortizone     Therapies Tried and outcome:       Elevated blood pressure reading without diagnosis of hypertension  (primary encounter diagnosis)- High bp while rooming.  132/92 on exam. Not treating.  BP Readings from Last 3 Encounters:   10/10/19 (!) 132/92   09/30/19 131/84   08/01/19 134/89       Pain in both upper arms- Patient complains of \"carpal tunnel\" pain in both wrists, which sometimes radiates up her arm to shoulder. Hands get stiff and weak and day to day activities become difficult. Right hand gives out sometimes, she talks about dropping a cup of coffee while pouring. Occupational work on hair and nails. Dx made clinically, braces ineffective, empiric cortisone efective for \"a while\", treated with periodic cortisone and chiropractic.  She also has recurrent cervicalgia treated with regular chiropractic. Visits chiropractor a couple times a week.         Migraines- Patient talks about having Headaches / migraines her whole life. Neck very stiff and inflamed at times.  Previous botox injections for migraine treatment not helpful. Tried Propranilol, imitrex, ibuprofen, not effective.  Headaches all the time, \"sometimes I'll take a nap.\"  She also  reports that they are quiescent recently for unknown reasons. Previous eval " at [facility] included LP.              Past Medical History:   Diagnosis Date     ADHD, adult residual type 3/11/2019     Anxiety 3/11/2019     Elevated blood pressure reading without diagnosis of hypertension 4/16/2019     Generalized anxiety disorder 4/15/2019     Migraine without aura and without status migrainosus, not intractable 3/11/2019     Pain in both upper arms 10/10/2019     PTSD (post-traumatic stress disorder) 4/15/2019       History reviewed. No pertinent surgical history.    Family History   Problem Relation Age of Onset     Anxiety Disorder Mother      Other - See Comments Father 40        Accident     Attention Deficit Disorder Sister      Developmental delay Brother      Attention Deficit Disorder Son        Social History     Tobacco Use     Smoking status: Current Some Day Smoker     Smokeless tobacco: Never Used     Tobacco comment: every now and then   Substance Use Topics     Alcohol use: Yes     Frequency: 2-3 times a week     Comment: few times a week         Reviewed and updated as needed this visit by Provider       Review of Systems   Bilateral shoulder pain, left more than right  No photophobia, no systemic symptoms        This document serves as a record of the services and decisions personally performed and made by Jorge Qureshi MD. It was created on his behalf by Sage Carter, a trained medical scribe. The creation of this document is based on the provider's statements to the medical scribe.  Sage Carter October 10, 2019 11:09 AM            Objective    BP (!) 132/92 (BP Location: Right arm, Patient Position: Chair, Cuff Size: Adult Large)   Pulse 94   Temp 98.4  F (36.9  C) (Oral)   Resp 16   Wt 69.4 kg (153 lb)   LMP 10/05/2019   SpO2 99%   Breastfeeding? No   BMI 23.96 kg/m    Body mass index is 23.96 kg/m .     Physical Exam   MS: L shoulder pain w int rotation, elbows bilat tender at lat epicondyle,hands with mild arthritic nodes,no synovitis  CN II-XII grossly symmetric            Diagnostic Test Results:  Labs reviewed in Epic  No results found for this or any previous visit (from the past 24 hour(s)).        Assessment & Plan   Problem List Items Addressed This Visit        Nervous and Auditory    Migraine without aura and without status migrainosus, not intractable     Now rare?  [facility] records requested         Pain in both upper arms     Weakness suggests neural  compromise. Exam suggests multiple orthopedic issues: overuse syndrome. Empiric cortisone declined         Relevant Orders    ORTHO  REFERRAL (Completed)       Other    Elevated blood pressure reading without diagnosis of hypertension - Primary     Discussed kumar history. Pt will collect outpt BPs                  No follow-ups on file.    The information in this document, created by the medical scribe for me, accurately reflects the services I personally performed and the decisions made by me. I have reviewed and approved this document for accuracy prior to leaving the patient care area.  October 10, 2019 11:11 AM    Jorge Qureshi MD  [facility]

## 2019-10-11 ENCOUNTER — ANCILLARY PROCEDURE (OUTPATIENT)
Dept: GENERAL RADIOLOGY | Facility: CLINIC | Age: 34
End: 2019-10-11
Attending: FAMILY MEDICINE
Payer: COMMERCIAL

## 2019-10-11 ENCOUNTER — OFFICE VISIT (OUTPATIENT)
Dept: ORTHOPEDICS | Facility: CLINIC | Age: 34
End: 2019-10-11
Payer: COMMERCIAL

## 2019-10-11 VITALS
BODY MASS INDEX: 24.01 KG/M2 | DIASTOLIC BLOOD PRESSURE: 82 MMHG | HEIGHT: 67 IN | WEIGHT: 153 LBS | SYSTOLIC BLOOD PRESSURE: 122 MMHG

## 2019-10-11 DIAGNOSIS — M54.12 CERVICAL RADICULOPATHY: ICD-10-CM

## 2019-10-11 DIAGNOSIS — G56.01 CARPAL TUNNEL SYNDROME OF RIGHT WRIST: ICD-10-CM

## 2019-10-11 DIAGNOSIS — G56.02 CARPAL TUNNEL SYNDROME OF LEFT WRIST: ICD-10-CM

## 2019-10-11 DIAGNOSIS — M54.12 CERVICAL RADICULOPATHY: Primary | ICD-10-CM

## 2019-10-11 PROCEDURE — 99244 OFF/OP CNSLTJ NEW/EST MOD 40: CPT | Performed by: FAMILY MEDICINE

## 2019-10-11 PROCEDURE — 72040 X-RAY EXAM NECK SPINE 2-3 VW: CPT

## 2019-10-11 RX ORDER — BACLOFEN 20 MG/1
20 TABLET ORAL
Qty: 30 TABLET | Refills: 0 | Status: SHIPPED | OUTPATIENT
Start: 2019-10-11 | End: 2019-12-17

## 2019-10-11 RX ORDER — NAPROXEN 500 MG/1
500 TABLET ORAL 2 TIMES DAILY WITH MEALS
Qty: 60 TABLET | Refills: 1 | Status: SHIPPED | OUTPATIENT
Start: 2019-10-11 | End: 2020-10-19

## 2019-10-11 RX ORDER — METHYLPREDNISOLONE 4 MG
TABLET, DOSE PACK ORAL
Qty: 21 TABLET | Refills: 0 | Status: SHIPPED | OUTPATIENT
Start: 2019-10-11 | End: 2019-11-18

## 2019-10-11 ASSESSMENT — MIFFLIN-ST. JEOR: SCORE: 1416.63

## 2019-10-11 NOTE — PROGRESS NOTES
ASSESSMENT & PLAN  Patient Instructions     1. Cervical radiculopathy    2. Carpal tunnel syndrome of left wrist    3. Carpal tunnel syndrome of right wrist      -Patient has 4 years of chronic neck pain, headaches and bilateral numbness tingling and weakness  -Patient likely has pinched nerves from her neck and possible carpal tunnel syndrome in both wrists as well.  -Patient will get an MRI of the cervical spine for further evaluation.Your MRI has been ordered. Will check for same day otherwise, schedule with [place] ([phone]). Once you know the date of your MRI, please call my office and schedule a follow-up visit for 2 days after that.  We will review results and consider epidural steroid injections if appropriate.  -Patient will start a steroid taper, once complete will continue with naproxen twice a day as needed.  Patient will start baclofen 20 mg at night for muscle spasms immediately.  -We will consider bilateral upper extremity nerve tests in the future to definitively diagnose carpal tunnel syndrome and will consider the ultrasound-guided minimally invasive procedure if we can find a provider that performs that.  -To consider carpal tunnel cortisone injection at subsequent visits as well.  -Call direct clinic number [[phone]] at any time with questions or concerns.    Albert Yeo MD [facility]  [facility]          -----    SUBJECTIVE  Chelle Noyola is a/an 34 year old Right handed female who is seen in consultation at the request of  Jorge Qureshi M.D. for evaluation of bilateral wrist pain. The patient is seen by themselves.    Onset: 4 years(s) ago with pain gradually getting worse. Reports insidious onset without acute precipitating event.  Location of Pain: bilateral hands with pain radiating up arms  Rating of Pain at worst: 8/10  Rating of Pain Currently: 2/10  Worsened by: driving, fine motor tasks, work duties  Better with:  rest/activity avoidance  Treatments tried: rest/activity avoidance, ice, heat and home exercises, cortisone injections of bilateral wrists (most recent: about 6 months ago) which provided 5 months of pain  Associated symptoms: weakness of bilateral hands, stiffness in hands, numbness and tingling in bilateral hands with numbness and tingling radiating to bilateral shoulders  Orthopedic history: YES - h/o chronic neck pain and headaches, has had chiropractor care  Relevant surgical history: NO  Social history: social history: works at nail technician,     Past Medical History:   Diagnosis Date     ADHD, adult residual type 3/11/2019     Anxiety 3/11/2019     Elevated blood pressure reading without diagnosis of hypertension 4/16/2019     Generalized anxiety disorder 4/15/2019     Migraine without aura and without status migrainosus, not intractable 3/11/2019     Pain in both upper arms 10/10/2019     PTSD (post-traumatic stress disorder) 4/15/2019     Social History     Socioeconomic History     Marital status:      Spouse name: Not on file     Number of children: Not on file     Years of education: Not on file     Highest education level: Not on file   Occupational History     Not on file   Social Needs     Financial resource strain: Not on file     Food insecurity:     Worry: Not on file     Inability: Not on file     Transportation needs:     Medical: Not on file     Non-medical: Not on file   Tobacco Use     Smoking status: Current Some Day Smoker     Smokeless tobacco: Never Used     Tobacco comment: every now and then   Substance and Sexual Activity     Alcohol use: Yes     Frequency: 2-3 times a week     Comment: few times a week     Drug use: No     Comment: None     Sexual activity: Yes     Partners: Male     Birth control/protection: I.U.D.   Lifestyle     Physical activity:     Days per week: Not on file     Minutes per session: Not on file     Stress: Not on file   Relationships      "Social connections:     Talks on phone: Not on file     Gets together: Not on file     Attends Temple service: Not on file     Active member of club or organization: Not on file     Attends meetings of clubs or organizations: Not on file     Relationship status: Not on file     Intimate partner violence:     Fear of current or ex partner: Not on file     Emotionally abused: Not on file     Physically abused: Not on file     Forced sexual activity: Not on file   Other Topics Concern     Not on file   Social History Narrative     Not on file         Patient's past medical, surgical, social, and family histories were reviewed today and no changes are noted.    REVIEW OF SYSTEMS:  10 point ROS is negative other than symptoms noted above in HPI, Past Medical History or as stated below  Constitutional: NEGATIVE for fever, chills, change in weight  Skin: NEGATIVE for worrisome rashes, moles or lesions  GI/: NEGATIVE for bowel or bladder changes  Neuro: NEGATIVE for weakness, dizziness or paresthesias    OBJECTIVE:  /82   Ht 1.702 m (5' 7\")   Wt 69.4 kg (153 lb)   LMP 10/05/2019   BMI 23.96 kg/m     General: healthy, alert and in no distress  HEENT: no scleral icterus or conjunctival erythema  Skin: no suspicious lesions or rash. No jaundice.  CV: regular rhythm by palpation  Resp: normal respiratory effort without conversational dyspnea   Psych: normal mood and affect  Gait: normal steady gait with appropriate coordination and balance  Neuro: normal light touch sensory exam of the bilateral upper extremities.    MSK:  BILATERAL WRIST  Inspection:    No swelling, bruising, discoloration, or obvious deformity or asymmetry  Palpation:   bony and ligamentous line marks are nontender.    Crepitus is Absent    Metacarpals: normal    Thumb: normal    Fingers: normal  Range of Motion:    Full (active and passive) flexion, extension, pronation/supination, and ulnar/radial deviation.  Strength:     strength " 5-/5  Special Tests:    Positive: Phalen's, Tinel's (carpal tunnel)      CERVICAL SPINE  Inspection:    normal cervical lordosis present, rounded shoulders, forward head posture  Palpation:    Tender about the paracervical musculature (bilateral), levator scapula (bilateral), upper trapezius (bilateral) and lower trapezius (bilateral). Otherwise remainder of the landmarks and nontender.  Range of Motion:     Flexion within normal limits    Extension within normal limits    Right side bend within normal limits    Left side bend within normal limits    Right rotation within normal limits    Left rotation within normal limits  Strength:    finger flexion (C8) 5-/5  Special Tests:    Positive: None    Negative: Spurling's (bilateral), Lopez's (bilateral)    Independent visualization of the below image:  Recent Results (from the past 24 hour(s))   XR Cervical Spine 2/3 vws    Narrative    CERVICAL SPINE TWO TO THREE VIEWS   10/11/2019 12:05 PM     HISTORY: Cervical radiculopathy.    COMPARISON: None.      Impression    IMPRESSION: Alignment is significant for slight dextroconvex curvature  of the cervicothoracic junction. No fractures are identified.  Intervertebral disc and facet joints are without appreciable  abnormality. No fractures are identified. Soft tissues are  unremarkable.    ERIC J HARTMAN, MD Albert Yeo MD [facility]

## 2019-10-11 NOTE — LETTER
10/11/2019         RE: Chelle Noyola  [id number]  [facility] [address]        Dear Colleague,    Thank you for referring your patient, Chelle Noyola, to the [facility]. Please see a copy of my visit note below.    ASSESSMENT & PLAN  Patient Instructions     1. Cervical radiculopathy    2. Carpal tunnel syndrome of left wrist    3. Carpal tunnel syndrome of right wrist      -Patient has 4 years of chronic neck pain, headaches and bilateral numbness tingling and weakness  -Patient likely has pinched nerves from her neck and possible carpal tunnel syndrome in both wrists as well.  -Patient will get an MRI of the cervical spine for further evaluation.Your MRI has been ordered. Will check for same day otherwise, schedule with [facility] ([phone]). Once you know the date of your MRI, please call my office and schedule a follow-up visit for 2 days after that.  We will review results and consider epidural steroid injections if appropriate.  -Patient will start a steroid taper, once complete will continue with naproxen twice a day as needed.  Patient will start baclofen 20 mg at night for muscle spasms immediately.  -We will consider bilateral upper extremity nerve tests in the future to definitively diagnose carpal tunnel syndrome and will consider the ultrasound-guided minimally invasive procedure if we can find a provider that performs that.  -To consider carpal tunnel cortisone injection at subsequent visits as well.  -Call direct clinic number [[phone]] at any time with questions or concerns.    Albert Yeo MD [facility] Orthoped[facility] and Sports Medicine  [facility]          -----    SUBJECTIVE  Chelle Noyola is a/an 34 year old Right handed female who is seen in consultation at the request of  Jorge Qureshi M.D. for evaluation of bilateral wrist pain. The patient is seen by themselves.    Onset: 4 years(s) ago with pain gradually getting worse. Reports insidious  onset without acute precipitating event.  Location of Pain: bilateral hands with pain radiating up arms  Rating of Pain at worst: 8/10  Rating of Pain Currently: 2/10  Worsened by: driving, fine motor tasks, work duties  Better with: rest/activity avoidance  Treatments tried: rest/activity avoidance, ice, heat and home exercises, cortisone injections of bilateral wrists (most recent: about 6 months ago) which provided 5 months of pain  Associated symptoms: weakness of bilateral hands, stiffness in hands, numbness and tingling in bilateral hands with numbness and tingling radiating to bilateral shoulders  Orthopedic history: YES - h/o chronic neck pain and headaches, has had chiropractor care  Relevant surgical history: NO  Social history: social history: works at ,     Past Medical History:   Diagnosis Date     ADHD, adult residual type 3/11/2019     Anxiety 3/11/2019     Elevated blood pressure reading without diagnosis of hypertension 4/16/2019     Generalized anxiety disorder 4/15/2019     Migraine without aura and without status migrainosus, not intractable 3/11/2019     Pain in both upper arms 10/10/2019     PTSD (post-traumatic stress disorder) 4/15/2019     Social History     Socioeconomic History     Marital status:      Spouse name: Not on file     Number of children: Not on file     Years of education: Not on file     Highest education level: Not on file   Occupational History     Not on file   Social Needs     Financial resource strain: Not on file     Food insecurity:     Worry: Not on file     Inability: Not on file     Transportation needs:     Medical: Not on file     Non-medical: Not on file   Tobacco Use     Smoking status: Current Some Day Smoker     Smokeless tobacco: Never Used     Tobacco comment: every now and then   Substance and Sexual Activity     Alcohol use: Yes     Frequency: 2-3 times a week     Comment: few times a week     Drug use: No     Comment:  "None     Sexual activity: Yes     Partners: Male     Birth control/protection: I.U.D.   Lifestyle     Physical activity:     Days per week: Not on file     Minutes per session: Not on file     Stress: Not on file   Relationships     Social connections:     Talks on phone: Not on file     Gets together: Not on file     Attends Evangelical service: Not on file     Active member of club or organization: Not on file     Attends meetings of clubs or organizations: Not on file     Relationship status: Not on file     Intimate partner violence:     Fear of current or ex partner: Not on file     Emotionally abused: Not on file     Physically abused: Not on file     Forced sexual activity: Not on file   Other Topics Concern     Not on file   Social History Narrative     Not on file         Patient's past medical, surgical, social, and family histories were reviewed today and no changes are noted.    REVIEW OF SYSTEMS:  10 point ROS is negative other than symptoms noted above in HPI, Past Medical History or as stated below  Constitutional: NEGATIVE for fever, chills, change in weight  Skin: NEGATIVE for worrisome rashes, moles or lesions  GI/: NEGATIVE for bowel or bladder changes  Neuro: NEGATIVE for weakness, dizziness or paresthesias    OBJECTIVE:  /82   Ht 1.702 m (5' 7\")   Wt 69.4 kg (153 lb)   LMP 10/05/2019   BMI 23.96 kg/m      General: healthy, alert and in no distress  HEENT: no scleral icterus or conjunctival erythema  Skin: no suspicious lesions or rash. No jaundice.  CV: regular rhythm by palpation  Resp: normal respiratory effort without conversational dyspnea   Psych: normal mood and affect  Gait: normal steady gait with appropriate coordination and balance  Neuro: normal light touch sensory exam of the bilateral upper extremities.    MSK:  BILATERAL WRIST  Inspection:    No swelling, bruising, discoloration, or obvious deformity or asymmetry  Palpation:   bony and ligamentous line marks are " nontender.    Crepitus is Absent    Metacarpals: normal    Thumb: normal    Fingers: normal  Range of Motion:    Full (active and passive) flexion, extension, pronation/supination, and ulnar/radial deviation.  Strength:     strength 5-/5  Special Tests:    Positive: Phalen's, Tinel's (carpal tunnel)      CERVICAL SPINE  Inspection:    normal cervical lordosis present, rounded shoulders, forward head posture  Palpation:    Tender about the paracervical musculature (bilateral), levator scapula (bilateral), upper trapezius (bilateral) and lower trapezius (bilateral). Otherwise remainder of the landmarks and nontender.  Range of Motion:     Flexion within normal limits    Extension within normal limits    Right side bend within normal limits    Left side bend within normal limits    Right rotation within normal limits    Left rotation within normal limits  Strength:    finger flexion (C8) 5-/5  Special Tests:    Positive: None    Negative: Spurling's (bilateral), Lopez's (bilateral)    Independent visualization of the below image:  Recent Results (from the past 24 hour(s))   XR Cervical Spine 2/3 vws    Narrative    CERVICAL SPINE TWO TO THREE VIEWS   10/11/2019 12:05 PM     HISTORY: Cervical radiculopathy.    COMPARISON: None.      Impression    IMPRESSION: Alignment is significant for slight dextroconvex curvature  of the cervicothoracic junction. No fractures are identified.  Intervertebral disc and facet joints are without appreciable  abnormality. No fractures are identified. Soft tissues are  unremarkable.    ERIC J HARTMAN, MD Albert Yeo MD [facility] Orthopedic Care      Again, thank you for allowing me to participate in the care of your patient.        Sincerely,        Albert Yeo, MD

## 2019-10-22 DIAGNOSIS — F41.1 GENERALIZED ANXIETY DISORDER: ICD-10-CM

## 2019-10-23 RX ORDER — FLUOXETINE 40 MG/1
CAPSULE ORAL
Qty: 30 CAPSULE | Refills: 0 | Status: SHIPPED | OUTPATIENT
Start: 2019-10-23 | End: 2019-11-18

## 2019-10-23 NOTE — TELEPHONE ENCOUNTER
Prescription approved per [facility] Refill Protocol.  Naun Louis, RN, BSN

## 2019-10-24 ENCOUNTER — HOSPITAL ENCOUNTER (OUTPATIENT)
Dept: MRI IMAGING | Facility: CLINIC | Age: 34
Discharge: HOME | End: 2019-10-24
Attending: FAMILY MEDICINE | Admitting: FAMILY MEDICINE
Payer: COMMERCIAL

## 2019-10-24 DIAGNOSIS — M54.12 CERVICAL RADICULOPATHY: ICD-10-CM

## 2019-10-24 PROCEDURE — 72141 MRI NECK SPINE W/O DYE: CPT

## 2019-10-25 ENCOUNTER — TELEPHONE (OUTPATIENT)
Dept: ORTHOPEDICS | Facility: CLINIC | Age: 34
End: 2019-10-25

## 2019-10-25 NOTE — TELEPHONE ENCOUNTER
Reviewed MRI in Dr. Yeo's absence.  Results are unremarkable.  Will defer to him with next steps when he returns to clinic next week.    Rosangela Fung DO, [facility] Orthopedic [facility]

## 2019-11-01 ENCOUNTER — TELEPHONE (OUTPATIENT)
Dept: ORTHOPEDICS | Facility: CLINIC | Age: 34
End: 2019-11-01

## 2019-11-01 ENCOUNTER — NURSE TRIAGE (OUTPATIENT)
Dept: NURSING | Facility: CLINIC | Age: 34
End: 2019-11-01

## 2019-11-01 NOTE — TELEPHONE ENCOUNTER
"Patient calls to request results of a \"MRI that was done 10/24/19.\"   Also has question asking \"where would I get a cortisone shot.\"  Per Epic this RN read to Patient the Telephone message 10/25/19 advising Patient to follow up with Dr. Yeo when returns to clinic.  Per [facility] this RN notes Imaging results 10/24/19 and 10/11/19 have not been signed off on by Provider.  Patient states has clinic number and plans to call to follow up next week.    Protocol-  Information Only- Adult  Care advice reviewed.   Disposition-  Information given.  Caller states understanding of the recommended disposition.   Advised to call back if further questions or concerns.     DEONNA SheldonN RN  [facility]     Reason for Disposition    [1] Caller requesting NON-URGENT health information AND [2] PCP's office is the best resource    Protocols used: INFORMATION ONLY CALL-A-AH    "

## 2019-11-01 NOTE — TELEPHONE ENCOUNTER
Reason for Call:  Request for results:    Name of test or procedure: MRI cervical spine    Date of test of procedure: 10/24/19    Plan for results from last OV: follow up in office    OK to leave the result message on voice mail or with a family member? YES    Phone number Patient can be reached at:  Home number on file [phone] (home)    Please also see telephone note dated 11/1/19 from PCP clinic.       Left message for patient to schedule an appointment with Dr. Yeo and scheduling number provided.     BERNARDINO Larios RN

## 2019-11-01 NOTE — TELEPHONE ENCOUNTER
I called patient let her know that the MRI of the cervical spine was normal.  No signs of disc bulging or pinched nerves.  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  Patient's pain numbness and tingling in her hands is likely solely due to carpal tunnel syndrome.  Patient was instructed to call our office back next week to discuss next of treatment options.  To consider cortisone injections to see if they can improve some of her symptoms.  We did discuss possible ultrasound-guided carpal tunnel release procedures.  I discussed with my colleague who states that only one doctor at the [facility] in [place] is performing this at this time.

## 2019-11-05 ENCOUNTER — TELEPHONE (OUTPATIENT)
Dept: ORTHOPEDICS | Facility: CLINIC | Age: 34
End: 2019-11-05

## 2019-11-05 NOTE — TELEPHONE ENCOUNTER
I called patient back today.  No answer.  Left message that if she did not have significant pain relief with oral steroids she can schedule an appointment for bilateral carpal tunnel cortisone injections.  That will only provide temporary relief.  If that does not alleviate her pain for a significant period of time she will need to get bilateral upper extremity EMG/nerve conduction studies and will base further treatments on those results.  Patient was instructed to schedule appointment if she is in agreement with that treatment plan.  If she is not, she is instructed to call our office back to discuss further and decide how she would like to proceed.  We may consider the ultrasound-guided carpal tunnel release procedure in the future.  Patient may consider seeing Dr. Yayo Berg at the [facility] in [place].

## 2019-11-05 NOTE — TELEPHONE ENCOUNTER
Reason for call:  LVM asking about treatment for her carpal tunnel. Said Dr. Yeo had mentioned and injection or even surgery. Would like to know what she should do for next steps. She needs to do something because it is bothering her a ton.    Phone number to reach patient:  Home number on file [phone] (home)    Can we leave a detailed message on this number?  YES   -gave verbal consent to leave a detailed message    Pattie Jeronimo ATC

## 2019-11-11 ENCOUNTER — OFFICE VISIT (OUTPATIENT)
Dept: ORTHOPEDICS | Facility: CLINIC | Age: 34
End: 2019-11-11
Payer: COMMERCIAL

## 2019-11-11 DIAGNOSIS — G56.01 CARPAL TUNNEL SYNDROME OF RIGHT WRIST: ICD-10-CM

## 2019-11-11 DIAGNOSIS — G56.02 CARPAL TUNNEL SYNDROME OF LEFT WRIST: Primary | ICD-10-CM

## 2019-11-11 PROCEDURE — 20526 THER INJECTION CARP TUNNEL: CPT | Mod: 50 | Performed by: FAMILY MEDICINE

## 2019-11-11 PROCEDURE — 76942 ECHO GUIDE FOR BIOPSY: CPT | Performed by: FAMILY MEDICINE

## 2019-11-11 RX ORDER — METHYLPREDNISOLONE ACETATE 40 MG/ML
60 INJECTION, SUSPENSION INTRA-ARTICULAR; INTRALESIONAL; INTRAMUSCULAR; SOFT TISSUE
Status: DISCONTINUED | OUTPATIENT
Start: 2019-11-11 | End: 2022-06-30

## 2019-11-11 RX ADMIN — METHYLPREDNISOLONE ACETATE 60 MG: 40 INJECTION, SUSPENSION INTRA-ARTICULAR; INTRALESIONAL; INTRAMUSCULAR; SOFT TISSUE at 10:41

## 2019-11-11 NOTE — LETTER
11/11/2019         RE: Chelle Noyola  [id number]  [facility] [address]        Dear Colleague,    Thank you for referring your patient, Chelle Noyola, to the [facility]. Please see a copy of my visit note below.    ASSESSMENT & PLAN  Patient Instructions     1. Carpal tunnel syndrome of left wrist    2. Carpal tunnel syndrome of right wrist      -Patient is here for bilateral wrist pain due to carpal tunnel syndrome.  -Patient tolerated bilateral carpal tunnel cortisone injections today without complications.  Patient was given post procedure instructions.  -Patient will call us once the cortisone starts to wear off to order bilateral EMGs and nerve conduction studies to confirm carpal tunnel syndrome.  Patient will then be referred to the [facility] to pursue ultrasound-guided carpal tunnel releases.  -Patient also reports ongoing chronic neck and upper back pains for which she may return to the clinic for trigger point injections.  -Call direct clinic number [[phone]] at any time with questions or concerns.    Albert Yeo MD [facility] Orthopedics and Sports Medicine  [facility]          -----    SUBJECTIVE:  Chelle Noyola is a 34 year old female who is seen for bilat wrist IA CSI.    Hand / Upper Extremity Injection/Arthrocentesis: bilateral carpal tunnel  Date/Time: 11/11/2019 10:41 AM  Performed by: Yeo, Albert, MD  Authorized by: Yeo, Albert, MD     Indications:  Pain  Needle Size:  22 G  Guidance: ultrasound    Approach:  Volar  Condition: carpal tunnel    Laterality:  Bilateral    Site:  Bilateral carpal tunnel  Medications (Right):  60 mg methylPREDNISolone 40 MG/ML  Medications (Left):  60 mg methylPREDNISolone 40 MG/ML  Outcome:  Tolerated well, no immediate complications  Procedure discussed: discussed risks, benefits, and alternatives    Consent Given by:  Patient  Timeout: timeout called immediately prior to procedure    Prep: patient was prepped and  draped in usual sterile fashion              Patient rates pain as 8/10 pre-procedure. Patient rates pain as 3/10 post-procedure.      Albert Yeo MD, [facility] Orthopedic Care    Again, thank you for allowing me to participate in the care of your patient.        Sincerely,        Albert Yeo, MD

## 2019-11-11 NOTE — PROGRESS NOTES
ASSESSMENT & PLAN  Patient Instructions     1. Carpal tunnel syndrome of left wrist    2. Carpal tunnel syndrome of right wrist      -Patient is here for bilateral wrist pain due to carpal tunnel syndrome.  -Patient tolerated bilateral carpal tunnel cortisone injections today without complications.  Patient was given post procedure instructions.  -Patient will call us once the cortisone starts to wear off to order bilateral EMGs and nerve conduction studies to confirm carpal tunnel syndrome.  Patient will then be referred to the [facility] to pursue ultrasound-guided carpal tunnel releases.  -Patient also reports ongoing chronic neck and upper back pains for which she may return to the clinic for trigger point injections.  -Call direct clinic number [[phone]] at any time with questions or concerns.    Albert Yeo MD [facility] Orthopedics and Sports Medicine  [facility]          -----    SUBJECTIVE:  Chelle Noyola is a 34 year old female who is seen for bilat wrist IA CSI.    Hand / Upper Extremity Injection/Arthrocentesis: bilateral carpal tunnel  Date/Time: 11/11/2019 10:41 AM  Performed by: Yeo, Albert, MD  Authorized by: Yeo, Albert, MD     Indications:  Pain  Needle Size:  22 G  Guidance: ultrasound    Approach:  Volar  Condition: carpal tunnel    Laterality:  Bilateral    Site:  Bilateral carpal tunnel  Medications (Right):  60 mg methylPREDNISolone 40 MG/ML  Medications (Left):  60 mg methylPREDNISolone 40 MG/ML  Outcome:  Tolerated well, no immediate complications  Procedure discussed: discussed risks, benefits, and alternatives    Consent Given by:  Patient  Timeout: timeout called immediately prior to procedure    Prep: patient was prepped and draped in usual sterile fashion              Patient rates pain as 8/10 pre-procedure. Patient rates pain as 3/10 post-procedure.      Albert Yeo MD, [facility] and Orthopedic Care

## 2019-11-11 NOTE — PATIENT INSTRUCTIONS
1. Carpal tunnel syndrome of left wrist    2. Carpal tunnel syndrome of right wrist      -Patient is here for bilateral wrist pain due to carpal tunnel syndrome.  -Patient tolerated bilateral carpal tunnel cortisone injections today without complications.  Patient was given post procedure instructions.  -Patient will call us once the cortisone starts to wear off to order bilateral EMGs and nerve conduction studies to confirm carpal tunnel syndrome.  Patient will then be referred to the [facility] to pursue ultrasound-guided carpal tunnel releases.  -Patient also reports ongoing chronic neck and upper back pains for which she may return to the clinic for trigger point injections.  -Call direct clinic number [[phone]] at any time with questions or concerns.    Albert Yeo MD [facility] and [facility] Medicine  [facility] [place]

## 2019-11-14 ENCOUNTER — OFFICE VISIT (OUTPATIENT)
Dept: ORTHOPEDICS | Facility: CLINIC | Age: 34
End: 2019-11-14
Payer: COMMERCIAL

## 2019-11-14 ENCOUNTER — TELEPHONE (OUTPATIENT)
Dept: ORTHOPEDICS | Facility: CLINIC | Age: 34
End: 2019-11-14

## 2019-11-14 VITALS
DIASTOLIC BLOOD PRESSURE: 80 MMHG | BODY MASS INDEX: 24.01 KG/M2 | WEIGHT: 153 LBS | SYSTOLIC BLOOD PRESSURE: 125 MMHG | HEIGHT: 67 IN

## 2019-11-14 DIAGNOSIS — S16.1XXA CERVICAL MYOFASCIAL STRAIN, INITIAL ENCOUNTER: ICD-10-CM

## 2019-11-14 DIAGNOSIS — G43.909 MIGRAINE WITHOUT STATUS MIGRAINOSUS, NOT INTRACTABLE, UNSPECIFIED MIGRAINE TYPE: Primary | ICD-10-CM

## 2019-11-14 PROCEDURE — 99214 OFFICE O/P EST MOD 30 MIN: CPT | Mod: 25 | Performed by: FAMILY MEDICINE

## 2019-11-14 PROCEDURE — 20550 NJX 1 TENDON SHEATH/LIGAMENT: CPT | Mod: 50 | Performed by: FAMILY MEDICINE

## 2019-11-14 RX ORDER — DICLOFENAC SODIUM 75 MG/1
75 TABLET, DELAYED RELEASE ORAL 2 TIMES DAILY PRN
Qty: 60 TABLET | Refills: 1 | Status: SHIPPED | OUTPATIENT
Start: 2019-11-14 | End: 2020-10-19

## 2019-11-14 RX ORDER — METHOCARBAMOL 750 MG/1
750 TABLET, FILM COATED ORAL 2 TIMES DAILY PRN
Qty: 60 TABLET | Refills: 1 | Status: SHIPPED | OUTPATIENT
Start: 2019-11-14 | End: 2019-12-17

## 2019-11-14 RX ORDER — METHYLPREDNISOLONE ACETATE 40 MG/ML
40 INJECTION, SUSPENSION INTRA-ARTICULAR; INTRALESIONAL; INTRAMUSCULAR; SOFT TISSUE
Status: DISCONTINUED | OUTPATIENT
Start: 2019-11-14 | End: 2022-06-30

## 2019-11-14 RX ADMIN — METHYLPREDNISOLONE ACETATE 40 MG: 40 INJECTION, SUSPENSION INTRA-ARTICULAR; INTRALESIONAL; INTRAMUSCULAR; SOFT TISSUE at 10:42

## 2019-11-14 ASSESSMENT — MIFFLIN-ST. JEOR: SCORE: 1416.63

## 2019-11-14 NOTE — PROGRESS NOTES
"ASSESSMENT & PLAN  Patient Instructions     1. Migraine without status migrainosus, not intractable, unspecified migraine type    2. Cervical myofascial strain, initial encounter      -Patient is here for follow-up of chronic cervical muscle strains  -Patient tolerated trigger point injections in bilateral trapezius musculatures without complications.  Patient was given postprocedure instructions.  -Patient will start diclofenac 75 mg twice a day as needed and Robaxin 750 mg twice a day as needed.  -Patient also reports history of migraines that were treated with Botox injections.  Patient is interested in receiving another Botox treatment.  Referral was placed with pain management to perform the procedure.  -Patient will follow-up when pain returns for reevaluation and further treatment  -Call direct clinic number [[phone]] at any time with questions or concerns.    Albert Yeo MD [facility] Orthopedics and Sports Medicine  [facility]          -----    SUBJECTIVE:  Chelle Noyola is a 34 year old female who is seen in follow-up for chronic cervical strains.They were last seen 11/11/2019 for bilateral carpal tunnel syndrome.     Since their last visit reports 0% - (About the same as last time). They indicate that their current pain level is 6/10. They have tried rest/activity avoidance, ice, Tylenol, ibuprofen and other medications: Baclofen.      The patient is seen by themselves.    Patient's past medical, surgical, social, and family histories were reviewed today and no changes are noted.    REVIEW OF SYSTEMS:  Constitutional: NEGATIVE for fever, chills, change in weight  Skin: NEGATIVE for worrisome rashes, moles or lesions  GI/: NEGATIVE for bowel or bladder changes  Neuro: NEGATIVE for weakness, dizziness or paresthesias    OBJECTIVE:  /80   Ht 1.702 m (5' 7\")   Wt 69.4 kg (153 lb)   BMI 23.96 kg/m     General: healthy, alert and in no distress  HEENT: no scleral icterus or " conjunctival erythema  Skin: no suspicious lesions or rash. No jaundice.  CV: regular rhythm by palpation, no pedal edema  Resp: normal respiratory effort without conversational dyspnea   Psych: normal mood and affect  Gait: normal steady gait with appropriate coordination and balance  Neuro: normal light touch sensory exam of the extremities.    MSK:  CERVICAL SPINE  Inspection:    normal cervical lordosis present, rounded shoulders, forward head posture  Palpation:    Tender about the paracervical musculature (bilateral), levator scapula (bilateral), upper trapezius (bilateral) and lower trapezius (bilateral). Otherwise remainder of the landmarks and nontender.  Range of Motion:     Flexion within normal limits    Extension within normal limits    Right side bend within normal limits    Left side bend within normal limits    Right rotation within normal limits    Left rotation within normal limits  Strength:    finger flexion (C8) 5-/5  Special Tests:    Positive: None    Negative: Spurling's (bilateral), Lopez's (bilateral)    Independent visualization of the below image:    Large Joint Injection/Trigger point  Date/Time: 11/14/2019 10:42 AM  Performed by: Yeo, Albert, MD  Authorized by: Yeo, Albert, MD     Indications:  Pain  Needle Size:  25 G  Guidance: landmark guided    Approach:  Posterior   Location comment:  Bilateral trapezius      Medications:  40 mg methylPREDNISolone 40 MG/ML  Outcome:  Tolerated well, no immediate complications  Procedure discussed: discussed risks, benefits, and alternatives    Consent Given by:  Patient  Timeout: timeout called immediately prior to procedure    Prep: patient was prepped and draped in usual sterile fashion            Patient's conditions were thoroughly discussed during today's visit with greater than 50% of the visit spent counseling the patient with total time spent face-to-face with the patient being 30 minutes.    Albert Yeo MD, [facility]  NavigatorMD  Orthopedic Care

## 2019-11-14 NOTE — TELEPHONE ENCOUNTER
Chelle Noyola is a 34 year old female who left voicemail stating she has an appointment today with Dr. Yeo at 9:20. She is not sure if he will be able to do the injection today because her neck is stiff and painful. She took some ibuprofen and is icing her neck.     Did not get voicemail until after 9 am. Left voicemail for patient to keep appointment.     BERNARDINO Larios RN

## 2019-11-14 NOTE — LETTER
11/14/2019         RE: Chelle Noyola  [id number]  [facility] [address]        Dear Colleague,    Thank you for referring your patient, Chelle Noyola, to the [facility]. Please see a copy of my visit note below.    ASSESSMENT & PLAN  Patient Instructions     1. Migraine without status migrainosus, not intractable, unspecified migraine type    2. Cervical myofascial strain, initial encounter      -Patient is here for follow-up of chronic cervical muscle strains  -Patient tolerated trigger point injections in bilateral trapezius musculatures without complications.  Patient was given postprocedure instructions.  -Patient will start diclofenac 75 mg twice a day as needed and Robaxin 750 mg twice a day as needed.  -Patient also reports history of migraines that were treated with Botox injections.  Patient is interested in receiving another Botox treatment.  Referral was placed with pain management to perform the procedure.  -Patient will follow-up when pain returns for reevaluation and further treatment  -Call direct clinic number [[phone]] at any time with questions or concerns.    Albert Yeo MD [facility] and [facility] Medicine  [facility] [place]          -----    SUBJECTIVE:  Chelle Noyola is a 34 year old female who is seen in follow-up for chronic cervical strains.They were last seen 11/11/2019 for bilateral carpal tunnel syndrome.     Since their last visit reports 0% - (About the same as last time). They indicate that their current pain level is 6/10. They have tried rest/activity avoidance, ice, Tylenol, ibuprofen and other medications: Baclofen.      The patient is seen by themselves.    Patient's past medical, surgical, social, and family histories were reviewed today and no changes are noted.    REVIEW OF SYSTEMS:  Constitutional: NEGATIVE for fever, chills, change in weight  Skin: NEGATIVE for worrisome rashes, moles or lesions  GI/: NEGATIVE for bowel  "or bladder changes  Neuro: NEGATIVE for weakness, dizziness or paresthesias    OBJECTIVE:  /80   Ht 1.702 m (5' 7\")   Wt 69.4 kg (153 lb)   BMI 23.96 kg/m      General: healthy, alert and in no distress  HEENT: no scleral icterus or conjunctival erythema  Skin: no suspicious lesions or rash. No jaundice.  CV: regular rhythm by palpation, no pedal edema  Resp: normal respiratory effort without conversational dyspnea   Psych: normal mood and affect  Gait: normal steady gait with appropriate coordination and balance  Neuro: normal light touch sensory exam of the extremities.    MSK:  CERVICAL SPINE  Inspection:    normal cervical lordosis present, rounded shoulders, forward head posture  Palpation:    Tender about the paracervical musculature (bilateral), levator scapula (bilateral), upper trapezius (bilateral) and lower trapezius (bilateral). Otherwise remainder of the landmarks and nontender.  Range of Motion:     Flexion within normal limits    Extension within normal limits    Right side bend within normal limits    Left side bend within normal limits    Right rotation within normal limits    Left rotation within normal limits  Strength:    finger flexion (C8) 5-/5  Special Tests:    Positive: None    Negative: Spurling's (bilateral), Lopez's (bilateral)    Independent visualization of the below image:    Large Joint Injection/Trigger point  Date/Time: 11/14/2019 10:42 AM  Performed by: Yeo, Albert, MD  Authorized by: Yeo, Albert, MD     Indications:  Pain  Needle Size:  25 G  Guidance: landmark guided    Approach:  Posterior   Location comment:  Bilateral trapezius      Medications:  40 mg methylPREDNISolone 40 MG/ML  Outcome:  Tolerated well, no immediate complications  Procedure discussed: discussed risks, benefits, and alternatives    Consent Given by:  Patient  Timeout: timeout called immediately prior to procedure    Prep: patient was prepped and draped in usual sterile fashion            Patient's " conditions were thoroughly discussed during today's visit with greater than 50% of the visit spent counseling the patient with total time spent face-to-face with the patient being 30 minutes.    Albert Yeo MD, [facility]          Again, thank you for allowing me to participate in the care of your patient.        Sincerely,        Albert Yeo, MD

## 2019-11-18 ENCOUNTER — OFFICE VISIT (OUTPATIENT)
Dept: FAMILY MEDICINE | Facility: CLINIC | Age: 34
End: 2019-11-18
Payer: COMMERCIAL

## 2019-11-18 VITALS
SYSTOLIC BLOOD PRESSURE: 100 MMHG | HEART RATE: 94 BPM | OXYGEN SATURATION: 98 % | BODY MASS INDEX: 24.75 KG/M2 | TEMPERATURE: 98.1 F | WEIGHT: 158 LBS | RESPIRATION RATE: 14 BRPM | DIASTOLIC BLOOD PRESSURE: 64 MMHG

## 2019-11-18 DIAGNOSIS — F41.1 GENERALIZED ANXIETY DISORDER: ICD-10-CM

## 2019-11-18 DIAGNOSIS — F43.10 PTSD (POST-TRAUMATIC STRESS DISORDER): ICD-10-CM

## 2019-11-18 DIAGNOSIS — F90.8 ADHD, ADULT RESIDUAL TYPE: ICD-10-CM

## 2019-11-18 PROCEDURE — 99214 OFFICE O/P EST MOD 30 MIN: CPT | Performed by: FAMILY MEDICINE

## 2019-11-18 RX ORDER — METHYLPHENIDATE HYDROCHLORIDE 36 MG/1
36 TABLET ORAL EVERY MORNING
Qty: 30 TABLET | Refills: 0 | Status: SHIPPED | OUTPATIENT
Start: 2019-11-29 | End: 2019-12-29

## 2019-11-18 RX ORDER — LAMOTRIGINE 200 MG/1
200 TABLET ORAL 2 TIMES DAILY
Qty: 180 TABLET | Refills: 1 | Status: SHIPPED | OUTPATIENT
Start: 2019-11-18 | End: 2020-06-23

## 2019-11-18 RX ORDER — CLONIDINE HYDROCHLORIDE 0.1 MG/1
0.1 TABLET ORAL 2 TIMES DAILY
Qty: 180 TABLET | Refills: 1 | Status: SHIPPED | OUTPATIENT
Start: 2019-11-18 | End: 2021-04-26

## 2019-11-18 ASSESSMENT — ANXIETY QUESTIONNAIRES
GAD7 TOTAL SCORE: 7
7. FEELING AFRAID AS IF SOMETHING AWFUL MIGHT HAPPEN: SEVERAL DAYS
4. TROUBLE RELAXING: SEVERAL DAYS
3. WORRYING TOO MUCH ABOUT DIFFERENT THINGS: SEVERAL DAYS
6. BECOMING EASILY ANNOYED OR IRRITABLE: SEVERAL DAYS
GAD7 TOTAL SCORE: 7
2. NOT BEING ABLE TO STOP OR CONTROL WORRYING: SEVERAL DAYS
GAD7 TOTAL SCORE: 7
5. BEING SO RESTLESS THAT IT IS HARD TO SIT STILL: SEVERAL DAYS
1. FEELING NERVOUS, ANXIOUS, OR ON EDGE: SEVERAL DAYS
7. FEELING AFRAID AS IF SOMETHING AWFUL MIGHT HAPPEN: SEVERAL DAYS

## 2019-11-18 ASSESSMENT — PATIENT HEALTH QUESTIONNAIRE - PHQ9
SUM OF ALL RESPONSES TO PHQ QUESTIONS 1-9: 9
SUM OF ALL RESPONSES TO PHQ QUESTIONS 1-9: 9
10. IF YOU CHECKED OFF ANY PROBLEMS, HOW DIFFICULT HAVE THESE PROBLEMS MADE IT FOR YOU TO DO YOUR WORK, TAKE CARE OF THINGS AT HOME, OR GET ALONG WITH OTHER PEOPLE: SOMEWHAT DIFFICULT

## 2019-11-18 ASSESSMENT — ENCOUNTER SYMPTOMS
NEUROLOGICAL NEGATIVE: 1
CONSTITUTIONAL NEGATIVE: 1
RESPIRATORY NEGATIVE: 1
DECREASED CONCENTRATION: 1
NERVOUS/ANXIOUS: 0

## 2019-11-18 NOTE — PROGRESS NOTES
Subjective     Chelle Noyola is a 34 year old female who presents to clinic today for the following health issues:      Mental Health Follow-up:  Patient presents to follow-up on Anxiety.Patient's anxiety since last visit has been:  No changeThe patient is not having other symptoms associated with anxiety.Any significant life events: NoPatient is not feeling anxious or having panic attacks.Patient has no concerns about alcohol or drug use.     Social History  Tobacco Use    Smoking status: Current Some Day Smoker    Smokeless tobacco: Never Used    Tobacco comment: every now and then  Alcohol use: Yes    Frequency: 2-3 times a week    Comment: few times a week  Drug use: No    Comment: None      Today's PHQ-9         PHQ-9 Total Score:     (P) 9   PHQ-9 Q9 Thoughts of better off dead/self-harm past 2 weeks :   (P) Not at all   Thoughts of suicide or self harm:      Self-harm Plan:        Self-harm Action:          Safety concerns for self or others:            Had been seeing Yovanny Lopez, now that he's gone she's looking to come in to primary care.  Currently using concerta, but had lowered this when anxiety was worse, would like to go back up.  Does not use clonazapam.  Feels that 40mg of fluoxetine is too much - messes with appetite, makes her feel a bit numb.         Review of Systems   Constitutional: Negative.    Respiratory: Negative.    Neurological: Negative.    Psychiatric/Behavioral: Positive for decreased concentration. Negative for mood changes. The patient is not nervous/anxious.             Objective    Wt 71.7 kg (158 lb)   BMI 24.75 kg/m    Body mass index is 24.75 kg/m .  Physical Exam  Vitals signs reviewed.   Neck:      Thyroid: No thyromegaly.   Cardiovascular:      Rate and Rhythm: Normal rate and regular rhythm.      Heart sounds: Normal heart sounds.   Pulmonary:      Effort: Pulmonary effort is normal.      Breath sounds: Normal breath sounds.   Skin:     General: Skin is warm and dry.    Neurological:      Mental Status: She is alert and oriented to person, place, and time.   Psychiatric:         Behavior: Behavior normal.       Assessment and Plan    (F90.8) ADHD, adult residual type  Comment: refilling clonidine, increasing concerta  Plan: cloNIDine (CATAPRES) 0.1 MG tablet,         methylphenidate (CONCERTA) 36 MG CR tablet            (F43.10) PTSD (post-traumatic stress disorder)  Comment: refilling  Plan: lamoTRIgine (LAMICTAL) 200 MG tablet            (F41.1) Generalized anxiety disorder  Comment: droppign dose slightly to 20mg  Plan: FLUoxetine (PROZAC) 20 MG capsule              RTC in 1m    Steve Echevarria MD

## 2019-11-19 ENCOUNTER — TRANSFERRED RECORDS (OUTPATIENT)
Dept: HEALTH INFORMATION MANAGEMENT | Facility: CLINIC | Age: 34
End: 2019-11-19

## 2019-11-19 ASSESSMENT — PATIENT HEALTH QUESTIONNAIRE - PHQ9: SUM OF ALL RESPONSES TO PHQ QUESTIONS 1-9: 9

## 2019-11-19 ASSESSMENT — ANXIETY QUESTIONNAIRES: GAD7 TOTAL SCORE: 7

## 2019-12-11 DIAGNOSIS — F41.1 GENERALIZED ANXIETY DISORDER: ICD-10-CM

## 2019-12-17 ENCOUNTER — OFFICE VISIT (OUTPATIENT)
Dept: FAMILY MEDICINE | Facility: CLINIC | Age: 34
End: 2019-12-17
Payer: COMMERCIAL

## 2019-12-17 ENCOUNTER — TRANSFERRED RECORDS (OUTPATIENT)
Dept: HEALTH INFORMATION MANAGEMENT | Facility: CLINIC | Age: 34
End: 2019-12-17

## 2019-12-17 VITALS
BODY MASS INDEX: 24.79 KG/M2 | DIASTOLIC BLOOD PRESSURE: 88 MMHG | HEART RATE: 84 BPM | RESPIRATION RATE: 16 BRPM | WEIGHT: 158.3 LBS | TEMPERATURE: 97.8 F | SYSTOLIC BLOOD PRESSURE: 124 MMHG

## 2019-12-17 DIAGNOSIS — F41.1 GENERALIZED ANXIETY DISORDER: ICD-10-CM

## 2019-12-17 DIAGNOSIS — M54.12 CERVICAL RADICULOPATHY: ICD-10-CM

## 2019-12-17 DIAGNOSIS — F90.8 ADHD, ADULT RESIDUAL TYPE: ICD-10-CM

## 2019-12-17 DIAGNOSIS — G56.02 CARPAL TUNNEL SYNDROME OF LEFT WRIST: ICD-10-CM

## 2019-12-17 DIAGNOSIS — F43.10 PTSD (POST-TRAUMATIC STRESS DISORDER): ICD-10-CM

## 2019-12-17 DIAGNOSIS — G56.01 CARPAL TUNNEL SYNDROME OF RIGHT WRIST: ICD-10-CM

## 2019-12-17 PROCEDURE — 99214 OFFICE O/P EST MOD 30 MIN: CPT | Performed by: FAMILY MEDICINE

## 2019-12-17 RX ORDER — LAMOTRIGINE 200 MG/1
200 TABLET ORAL 2 TIMES DAILY
Qty: 180 TABLET | Refills: 1 | Status: CANCELLED | OUTPATIENT
Start: 2019-12-17

## 2019-12-17 RX ORDER — METHYLPHENIDATE HYDROCHLORIDE 36 MG/1
36 TABLET ORAL DAILY
Qty: 30 TABLET | Refills: 0 | Status: SHIPPED | OUTPATIENT
Start: 2019-12-17 | End: 2020-01-16

## 2019-12-17 RX ORDER — METHYLPHENIDATE HYDROCHLORIDE 36 MG/1
36 TABLET ORAL DAILY
Qty: 30 TABLET | Refills: 0 | Status: SHIPPED | OUTPATIENT
Start: 2020-02-17 | End: 2020-03-18

## 2019-12-17 RX ORDER — METHYLPHENIDATE HYDROCHLORIDE 36 MG/1
36 TABLET ORAL EVERY MORNING
Qty: 30 TABLET | Refills: 0 | Status: CANCELLED | OUTPATIENT
Start: 2019-12-17

## 2019-12-17 RX ORDER — METHYLPHENIDATE HYDROCHLORIDE 36 MG/1
36 TABLET ORAL DAILY
Qty: 30 TABLET | Refills: 0 | Status: SHIPPED | OUTPATIENT
Start: 2020-01-17 | End: 2020-02-16

## 2019-12-17 RX ORDER — BACLOFEN 20 MG/1
20 TABLET ORAL
Qty: 30 TABLET | Refills: 1 | Status: SHIPPED | OUTPATIENT
Start: 2019-12-17 | End: 2020-05-11

## 2019-12-17 RX ORDER — CLONIDINE HYDROCHLORIDE 0.1 MG/1
0.1 TABLET ORAL 2 TIMES DAILY
Qty: 180 TABLET | Refills: 1 | Status: CANCELLED | OUTPATIENT
Start: 2019-12-17

## 2019-12-17 ASSESSMENT — ENCOUNTER SYMPTOMS
SLEEP DISTURBANCE: 1
DYSPHORIC MOOD: 0
ABDOMINAL PAIN: 0
NERVOUS/ANXIOUS: 0
PALPITATIONS: 0
CONSTITUTIONAL NEGATIVE: 1
SHORTNESS OF BREATH: 0
HEADACHES: 0

## 2019-12-17 NOTE — PROGRESS NOTES
Subjective     Chelle Noyola is a 34 year old female who presents to clinic today for the following health issues:    HPI   Medication Followup of:  methylphenidate (CONCERTA) 36 MG CR tablet    Taking Medication as prescribed: NO-prozac taking every other day     Side Effects:  None    Medication Helping Symptoms:  yes     Tolerating lower dose of fluoxetine, is actually using 20mg every other day.  Perhaps a little bit more irritable, but less anxious.  Higher dose of concerta works well, some more trouble falling asleep, clonidine helps, as dose occasional benadryl.  No palpitations, HA, abd pain, anorexia.         Review of Systems   Constitutional: Negative.    Eyes: Negative for visual disturbance.   Respiratory: Negative for shortness of breath.    Cardiovascular: Negative for palpitations.   Gastrointestinal: Negative for abdominal pain.   Neurological: Negative for headaches.   Psychiatric/Behavioral: Positive for sleep disturbance. Negative for dysphoric mood and mood changes. The patient is not nervous/anxious.             Objective    /88 (BP Location: Right arm, Patient Position: Sitting, Cuff Size: Adult Regular)   Pulse 84   Temp 97.8  F (36.6  C) (Oral)   Resp 16   Wt 71.8 kg (158 lb 4.8 oz)   BMI 24.79 kg/m    Body mass index is 24.79 kg/m .  Physical Exam  Vitals signs reviewed.   Neck:      Thyroid: No thyromegaly.   Cardiovascular:      Rate and Rhythm: Normal rate and regular rhythm.      Heart sounds: Normal heart sounds.   Pulmonary:      Effort: Pulmonary effort is normal.      Breath sounds: Normal breath sounds.   Skin:     General: Skin is warm and dry.   Neurological:      Mental Status: She is alert and oriented to person, place, and time.   Psychiatric:         Behavior: Behavior normal.        Assessment and Plan    (M54.12) Cervical radiculopathy  Comment: uses occasinally, refill  Plan: baclofen (LIORESAL) 20 MG tablet            (G56.02) Carpal tunnel syndrome of left  wrist  Comment:   Plan: baclofen (LIORESAL) 20 MG tablet            (G56.01) Carpal tunnel syndrome of right wrist  Comment:   Plan: baclofen (LIORESAL) 20 MG tablet            (F90.8) ADHD, adult residual type  Comment: 3m refills provided.  May call for another 3m cycle, OV in 6m      Plan: methylphenidate (CONCERTA) 36 MG CR tablet,         methylphenidate (CONCERTA) 36 MG CR tablet,         methylphenidate (CONCERTA) 36 MG CR tablet              (F41.1) Generalized anxiety disorder  Comment: refill  Plan: FLUoxetine (PROZAC) 20 MG capsule              RTC in 6m    Steve Echevarria MD

## 2019-12-17 NOTE — NURSING NOTE
"Chief Complaint   Patient presents with     A.D.H.D     Initial /88 (BP Location: Right arm, Patient Position: Sitting, Cuff Size: Adult Regular)   Pulse 84   Temp 97.8  F (36.6  C) (Oral)   Resp 16   Wt 71.8 kg (158 lb 4.8 oz)   BMI 24.79 kg/m   Estimated body mass index is 24.79 kg/m  as calculated from the following:    Height as of 11/14/19: 1.702 m (5' 7\").    Weight as of this encounter: 71.8 kg (158 lb 4.8 oz).  BP completed using cuff size regular RIGHT arm    Lisa Magill, CMA    "

## 2020-01-03 ENCOUNTER — OFFICE VISIT (OUTPATIENT)
Dept: ORTHOPEDICS | Facility: CLINIC | Age: 35
End: 2020-01-03
Payer: COMMERCIAL

## 2020-01-03 VITALS
DIASTOLIC BLOOD PRESSURE: 60 MMHG | HEIGHT: 67 IN | SYSTOLIC BLOOD PRESSURE: 118 MMHG | BODY MASS INDEX: 24.8 KG/M2 | WEIGHT: 158 LBS

## 2020-01-03 DIAGNOSIS — G56.02 CARPAL TUNNEL SYNDROME OF LEFT WRIST: ICD-10-CM

## 2020-01-03 DIAGNOSIS — S16.1XXD CERVICAL MYOFASCIAL STRAIN, SUBSEQUENT ENCOUNTER: Primary | ICD-10-CM

## 2020-01-03 DIAGNOSIS — G56.01 CARPAL TUNNEL SYNDROME OF RIGHT WRIST: ICD-10-CM

## 2020-01-03 PROCEDURE — 99214 OFFICE O/P EST MOD 30 MIN: CPT | Mod: 25 | Performed by: FAMILY MEDICINE

## 2020-01-03 PROCEDURE — 20552 NJX 1/MLT TRIGGER POINT 1/2: CPT | Performed by: FAMILY MEDICINE

## 2020-01-03 RX ORDER — METHYLPREDNISOLONE ACETATE 40 MG/ML
40 INJECTION, SUSPENSION INTRA-ARTICULAR; INTRALESIONAL; INTRAMUSCULAR; SOFT TISSUE
Status: DISCONTINUED | OUTPATIENT
Start: 2020-01-03 | End: 2022-06-30

## 2020-01-03 RX ADMIN — METHYLPREDNISOLONE ACETATE 40 MG: 40 INJECTION, SUSPENSION INTRA-ARTICULAR; INTRALESIONAL; INTRAMUSCULAR; SOFT TISSUE at 10:20

## 2020-01-03 ASSESSMENT — MIFFLIN-ST. JEOR: SCORE: 1439.31

## 2020-01-03 NOTE — PROGRESS NOTES
ASSESSMENT & PLAN  Patient Instructions     1. Cervical myofascial strain, subsequent encounter    2. Carpal tunnel syndrome of left wrist    3. Carpal tunnel syndrome of right wrist      -Patient is here for follow-up of chronic musculoskeletal neck and upper back pains and bilateral carpal tunnel syndrome.  -Patient had approximately 2 months of pain relief from the previous trigger point injections.  -Patient tolerated bilateral trapezius trigger point injections today without complications.  -Patient will continue with home exercises, heat, massages and oral muscle relaxers as needed  -An EMG order was placed today with Dr. Mcgregor's office.  I will call with results.  Patient is planning on having the ultrasound guided carpal tunnel release at the [facility] at her earliest convenience.  -Call direct clinic number [[phone]] at any time with questions or concerns.    Albert Yeo MD [facility] Orthopedics and Sports Medicine  [facility]          -----    SUBJECTIVE:  Chelle Noyola is a 34 year old female who is seen in follow-up for chronic cervical muscle strainsThey were last seen 11/14/2019.     Since their last visit reports 50% improvement. States doesn't feel as tight as previously.  They indicate that their current pain level is 4/10. They have tried other medications: Robaxin and baclofen as needed states the diclofenac did not help at all  and corticosteroid injection (most recent date: 11/14/19 that provided  2 month(s) of relief. States tightness started back up a few weeks ago after was sanding furniture in garage.      The patient is seen by themselves.    Patient's past medical, surgical, social, and family histories were reviewed today and no changes are noted.    REVIEW OF SYSTEMS:  Constitutional: NEGATIVE for fever, chills, change in weight  Skin: NEGATIVE for worrisome rashes, moles or lesions  GI/: NEGATIVE for bowel or bladder changes  Neuro: NEGATIVE for weakness,  "dizziness or paresthesias    OBJECTIVE:  /60   Ht 1.702 m (5' 7\")   Wt 71.7 kg (158 lb)   BMI 24.75 kg/m     General: healthy, alert and in no distress  HEENT: no scleral icterus or conjunctival erythema  Skin: no suspicious lesions or rash. No jaundice.  CV: regular rhythm by palpation, no pedal edema  Resp: normal respiratory effort without conversational dyspnea   Psych: normal mood and affect  Gait: normal steady gait with appropriate coordination and balance  Neuro: normal light touch sensory exam of the extremities.    MSK:  CERVICAL SPINE  Inspection:    normal cervical lordosis present, rounded shoulders, forward head posture  Palpation:    Tender about the paracervical musculature (bilateral), levator scapula (bilateral), upper trapezius (bilateral) and lower trapezius (bilateral) with significant spasms noted (left worse than right). Otherwise remainder of the landmarks and nontender.  Range of Motion:     Flexion within normal limits    Extension within normal limits    Right side bend within normal limits    Left side bend within normal limits    Right rotation within normal limits    Left rotation within normal limits  Strength:    finger flexion (C8) 5-/5  Special Tests:    Positive: None    Negative: Spurling's (bilateral), Lopez's (bilateral)    BILATERAL WRIST  Inspection:    No swelling, bruising, discoloration, or obvious deformity or asymmetry  Palpation:   bony and ligamentous line marks are nontender.    Crepitus is Absent    Metacarpals: normal    Thumb: normal    Fingers: normal  Range of Motion:    Full (active and passive) flexion, extension, pronation/supination, and ulnar/radial deviation.  Strength:     strength 5-/5  Special Tests:    Positive: Phalen's, Tinel's (carpal tunnel)    Large Joint Injection/Arthocentesis  Date/Time: 1/3/2020 10:20 AM  Performed by: Yeo, Albert, MD  Authorized by: Yeo, Albert, MD     Indications:  Pain and osteoarthritis  Needle Size:  22 " G  Guidance: landmark guided    Approach:  Posterior  Location:  Shoulder   Location comment:  Trigger point injections of bilateral lower trapezius muscle      Medications:  40 mg methylPREDNISolone 40 MG/ML  Outcome:  Tolerated well, no immediate complications  Procedure discussed: discussed risks, benefits, and alternatives    Consent Given by:  Patient  Timeout: timeout called immediately prior to procedure    Prep: patient was prepped and draped in usual sterile fashion            Patient's conditions were thoroughly discussed during today's visit with greater than 50% of the visit spent counseling the patient with total time spent face-to-face with the patient being 30 minutes.    Albert Yeo MD, CAQSM  [facility]

## 2020-01-03 NOTE — PATIENT INSTRUCTIONS
1. Cervical myofascial strain, subsequent encounter    2. Carpal tunnel syndrome of left wrist    3. Carpal tunnel syndrome of right wrist      -Patient is here for follow-up of chronic musculoskeletal neck and upper back pains and bilateral carpal tunnel syndrome.  -Patient had approximately 2 months of pain relief from the previous trigger point injections.  -Patient tolerated bilateral trapezius trigger point injections today without complications.  -Patient will continue with home exercises, heat, massages and oral muscle relaxers as needed  -An EMG order was placed today with Dr. Mcgregor's office.  I will call with results.  Patient is planning on having the ultrasound guided carpal tunnel release at the [facility] at her earliest convenience.  -Call direct clinic number [[phone]] at any time with questions or concerns.    Albert Yeo MD [facility] Orthopedics and Sports Medicine  [facility] [place]

## 2020-01-03 NOTE — LETTER
1/3/2020         RE: Chelle Noyola  [id number]  [facility] [address]        Dear Colleague,    Thank you for referring your patient, Chelle Noyola, to the [facility]. Please see a copy of my visit note below.    ASSESSMENT & PLAN  Patient Instructions     1. Cervical myofascial strain, subsequent encounter    2. Carpal tunnel syndrome of left wrist    3. Carpal tunnel syndrome of right wrist      -Patient is here for follow-up of chronic musculoskeletal neck and upper back pains and bilateral carpal tunnel syndrome.  -Patient had approximately 2 months of pain relief from the previous trigger point injections.  -Patient tolerated bilateral trapezius trigger point injections today without complications.  -Patient will continue with home exercises, heat, massages and oral muscle relaxers as needed  -An EMG order was placed today with Dr. Mcgregor's office.  I will call with results.  Patient is planning on having the ultrasound guided carpal tunnel release at the [facility] at her earliest convenience.  -Call direct clinic number [[phone]] at any time with questions or concerns.    Albert Yeo MD [facility] Orthopedics and Sports Medicine  [facility] [place]          -----    SUBJECTIVE:  Chelle Noyola is a 34 year old female who is seen in follow-up for chronic cervical muscle strainsThey were last seen 11/14/2019.     Since their last visit reports 50% improvement. States doesn't feel as tight as previously.  They indicate that their current pain level is 4/10. They have tried other medications: Robaxin and baclofen as needed states the diclofenac did not help at all  and corticosteroid injection (most recent date: 11/14/19 that provided  2 month(s) of relief. States tightness started back up a few weeks ago after was sanding furniture in garage.      The patient is seen by themselves.    Patient's past medical, surgical, social, and family histories were reviewed today and  "no changes are noted.    REVIEW OF SYSTEMS:  Constitutional: NEGATIVE for fever, chills, change in weight  Skin: NEGATIVE for worrisome rashes, moles or lesions  GI/: NEGATIVE for bowel or bladder changes  Neuro: NEGATIVE for weakness, dizziness or paresthesias    OBJECTIVE:  /60   Ht 1.702 m (5' 7\")   Wt 71.7 kg (158 lb)   BMI 24.75 kg/m      General: healthy, alert and in no distress  HEENT: no scleral icterus or conjunctival erythema  Skin: no suspicious lesions or rash. No jaundice.  CV: regular rhythm by palpation, no pedal edema  Resp: normal respiratory effort without conversational dyspnea   Psych: normal mood and affect  Gait: normal steady gait with appropriate coordination and balance  Neuro: normal light touch sensory exam of the extremities.    MSK:  CERVICAL SPINE  Inspection:    normal cervical lordosis present, rounded shoulders, forward head posture  Palpation:    Tender about the paracervical musculature (bilateral), levator scapula (bilateral), upper trapezius (bilateral) and lower trapezius (bilateral) with significant spasms noted (left worse than right). Otherwise remainder of the landmarks and nontender.  Range of Motion:     Flexion within normal limits    Extension within normal limits    Right side bend within normal limits    Left side bend within normal limits    Right rotation within normal limits    Left rotation within normal limits  Strength:    finger flexion (C8) 5-/5  Special Tests:    Positive: None    Negative: Spurling's (bilateral), Lopez's (bilateral)    BILATERAL WRIST  Inspection:    No swelling, bruising, discoloration, or obvious deformity or asymmetry  Palpation:   bony and ligamentous line marks are nontender.    Crepitus is Absent    Metacarpals: normal    Thumb: normal    Fingers: normal  Range of Motion:    Full (active and passive) flexion, extension, pronation/supination, and ulnar/radial deviation.  Strength:     strength 5-/5  Special Tests:    " Positive: Phalen's, Tinel's (carpal tunnel)    Large Joint Injection/Arthocentesis  Date/Time: 1/3/2020 10:20 AM  Performed by: Yeo, Albert, MD  Authorized by: Yeo, Albert, MD     Indications:  Pain and osteoarthritis  Needle Size:  22 G  Guidance: landmark guided    Approach:  Posterior  Location:  Shoulder   Location comment:  Trigger point injections of bilateral lower trapezius muscle      Medications:  40 mg methylPREDNISolone 40 MG/ML  Outcome:  Tolerated well, no immediate complications  Procedure discussed: discussed risks, benefits, and alternatives    Consent Given by:  Patient  Timeout: timeout called immediately prior to procedure    Prep: patient was prepped and draped in usual sterile fashion            Patient's conditions were thoroughly discussed during today's visit with greater than 50% of the visit spent counseling the patient with total time spent face-to-face with the patient being 30 minutes.    Albert Yeo MD, [facility]          Again, thank you for allowing me to participate in the care of your patient.        Sincerely,        Albert Yeo, MD

## 2020-01-12 DIAGNOSIS — G56.01 CARPAL TUNNEL SYNDROME OF RIGHT WRIST: ICD-10-CM

## 2020-01-12 DIAGNOSIS — G56.02 CARPAL TUNNEL SYNDROME OF LEFT WRIST: ICD-10-CM

## 2020-01-12 DIAGNOSIS — F41.1 GENERALIZED ANXIETY DISORDER: ICD-10-CM

## 2020-01-12 DIAGNOSIS — M54.12 CERVICAL RADICULOPATHY: ICD-10-CM

## 2020-01-13 NOTE — TELEPHONE ENCOUNTER
Routing refill request to provider for review/approval because:  Drug not on the [facility] refill protocol   Drug interaction warning  Tanya Brown RN, BSN

## 2020-01-14 RX ORDER — BACLOFEN 20 MG/1
20 TABLET ORAL
Qty: 30 TABLET | Refills: 1 | OUTPATIENT
Start: 2020-01-14

## 2020-01-22 DIAGNOSIS — F41.1 GENERALIZED ANXIETY DISORDER: ICD-10-CM

## 2020-01-22 DIAGNOSIS — M54.12 CERVICAL RADICULOPATHY: ICD-10-CM

## 2020-01-22 DIAGNOSIS — G56.01 CARPAL TUNNEL SYNDROME OF RIGHT WRIST: ICD-10-CM

## 2020-01-22 DIAGNOSIS — G56.02 CARPAL TUNNEL SYNDROME OF LEFT WRIST: ICD-10-CM

## 2020-01-22 RX ORDER — BACLOFEN 20 MG/1
20 TABLET ORAL
Qty: 30 TABLET | Refills: 1
Start: 2020-01-22

## 2020-03-16 DIAGNOSIS — F90.8 ADHD, ADULT RESIDUAL TYPE: ICD-10-CM

## 2020-03-16 RX ORDER — METHYLPHENIDATE HYDROCHLORIDE 36 MG/1
36 TABLET ORAL DAILY
Qty: 30 TABLET | Refills: 0 | Status: CANCELLED | OUTPATIENT
Start: 2020-03-16

## 2020-03-17 RX ORDER — METHYLPHENIDATE HYDROCHLORIDE 36 MG/1
36 TABLET ORAL DAILY
Qty: 30 TABLET | Refills: 0 | Status: SHIPPED | OUTPATIENT
Start: 2020-03-17 | End: 2020-04-16

## 2020-03-17 RX ORDER — METHYLPHENIDATE HYDROCHLORIDE 36 MG/1
36 TABLET ORAL DAILY
Qty: 30 TABLET | Refills: 0 | Status: SHIPPED | OUTPATIENT
Start: 2020-05-18 | End: 2020-06-17

## 2020-03-17 RX ORDER — METHYLPHENIDATE HYDROCHLORIDE 36 MG/1
36 TABLET ORAL DAILY
Qty: 30 TABLET | Refills: 0 | Status: SHIPPED | OUTPATIENT
Start: 2020-04-17 | End: 2020-05-17

## 2020-03-17 NOTE — TELEPHONE ENCOUNTER
Spoke with patient and [facility] is no longer able to fill her scripts due to insurance.  She needs to use [facility].  Pt is due in June for 6 month med check so 3 months have been pended    RX [place] reviewed:  reviewed- no concerns    [place]:  [email or web address]      Tanya Brown RN, BSN

## 2020-03-23 ENCOUNTER — TELEPHONE (OUTPATIENT)
Dept: ORTHOPEDICS | Facility: CLINIC | Age: 35
End: 2020-03-23

## 2020-03-23 NOTE — TELEPHONE ENCOUNTER
LVM with patient to call us back. Wanted to check in with her and see how she was doing. Due to COVID-19; does she still want to come in for her appointment? Or would she be ok pushing her appointment out for 3 weeks?    Was she able to get her EMG done?      Will await return phone call.     Tracey Long MS, ATC

## 2020-05-08 DIAGNOSIS — M54.12 CERVICAL RADICULOPATHY: ICD-10-CM

## 2020-05-08 DIAGNOSIS — G56.02 CARPAL TUNNEL SYNDROME OF LEFT WRIST: ICD-10-CM

## 2020-05-08 DIAGNOSIS — G56.01 CARPAL TUNNEL SYNDROME OF RIGHT WRIST: ICD-10-CM

## 2020-05-11 RX ORDER — BACLOFEN 20 MG/1
TABLET ORAL
Qty: 30 TABLET | Refills: 0 | Status: SHIPPED | OUTPATIENT
Start: 2020-05-11 | End: 2020-06-23

## 2020-07-29 DIAGNOSIS — M54.12 CERVICAL RADICULOPATHY: ICD-10-CM

## 2020-07-29 DIAGNOSIS — G56.02 CARPAL TUNNEL SYNDROME OF LEFT WRIST: ICD-10-CM

## 2020-07-29 DIAGNOSIS — G56.01 CARPAL TUNNEL SYNDROME OF RIGHT WRIST: ICD-10-CM

## 2020-07-31 RX ORDER — BACLOFEN 20 MG/1
TABLET ORAL
Qty: 30 TABLET | Refills: 0 | Status: SHIPPED | OUTPATIENT
Start: 2020-07-31 | End: 2020-09-10

## 2020-09-10 DIAGNOSIS — M54.12 CERVICAL RADICULOPATHY: ICD-10-CM

## 2020-09-10 DIAGNOSIS — G56.01 CARPAL TUNNEL SYNDROME OF RIGHT WRIST: ICD-10-CM

## 2020-09-10 DIAGNOSIS — G56.02 CARPAL TUNNEL SYNDROME OF LEFT WRIST: ICD-10-CM

## 2020-09-10 RX ORDER — BACLOFEN 20 MG/1
TABLET ORAL
Qty: 30 TABLET | Refills: 0 | OUTPATIENT
Start: 2020-09-10

## 2020-09-11 NOTE — PROGRESS NOTES
Pre-Visit Planning     Future Appointments   Date Time Provider Department Center   9/14/2020  2:35 PM Steve Echevarria MD [facility]     Arrival Time for this Appointment:  2:20 PM   Appointment Notes for this encounter:   [facility]  med refil [phone]    Questionnaires Reviewed/Assigned  Additional questionnaires assigned - PHQ-2    Patient preferred phone number: [phone]    Visit Summaries:  12/17/2019 OV w/ S.B., per note: Tolerating lower dose of fluoxetine, is actually using 20mg every other day.  Perhaps a little bit more irritable, but less anxious.  Higher dose of concerta works well, some more trouble falling asleep, clonidine helps, as does occasional benadryl.  12/17/2019 Transferred records from [email or web address] of Neurology regarding botox injection for headaches  01/03/20 [facility]/ Sports medicine for neck pain, per note: -Patient is here for follow-up of chronic musculoskeletal neck and upper back pains and bilateral carpal tunnel syndrome.   -Patient had approximately 2 months of pain relief from the previous trigger point injections.   -Patient tolerated bilateral trapezius trigger point injections today without complications.   -Patient will continue with home exercises, heat, massages and oral muscle relaxers as needed   -An EMG order was placed today with Dr. Mcgregor's office [Does not look like this was done].  I will call with results.  Pt is planning on having the ultrasound guided carpal tunnel release at the [facility] at her earliest convenience.      Unable to reach. Left voicemail.   Sergio CAMPBELL RN

## 2020-09-14 ENCOUNTER — VIRTUAL VISIT (OUTPATIENT)
Dept: FAMILY MEDICINE | Facility: CLINIC | Age: 35
End: 2020-09-14
Payer: COMMERCIAL

## 2020-09-14 DIAGNOSIS — F90.8 ADHD, ADULT RESIDUAL TYPE: Primary | ICD-10-CM

## 2020-09-14 PROCEDURE — 99214 OFFICE O/P EST MOD 30 MIN: CPT | Mod: 95 | Performed by: FAMILY MEDICINE

## 2020-09-14 RX ORDER — METHYLPHENIDATE HYDROCHLORIDE 36 MG/1
TABLET ORAL
Status: CANCELLED | OUTPATIENT
Start: 2020-09-14

## 2020-09-14 RX ORDER — BACLOFEN 20 MG/1
TABLET ORAL
Qty: 30 TABLET | Refills: 0 | Status: SHIPPED | OUTPATIENT
Start: 2020-09-14 | End: 2022-09-14

## 2020-09-14 RX ORDER — METHYLPHENIDATE HYDROCHLORIDE 36 MG/1
TABLET ORAL
COMMUNITY
Start: 2020-09-13 | End: 2020-10-19

## 2020-09-14 RX ORDER — METHYLPHENIDATE HYDROCHLORIDE 36 MG/1
36 TABLET ORAL EVERY MORNING
Qty: 30 TABLET | Refills: 0 | Status: SHIPPED | OUTPATIENT
Start: 2020-09-14 | End: 2020-10-14

## 2020-09-14 RX ORDER — METHYLPHENIDATE HYDROCHLORIDE 20 MG/1
20 TABLET ORAL 2 TIMES DAILY
Qty: 60 TABLET | Refills: 0 | Status: SHIPPED | OUTPATIENT
Start: 2020-09-14 | End: 2020-10-14

## 2020-09-14 ASSESSMENT — ENCOUNTER SYMPTOMS
NERVOUS/ANXIOUS: 0
CONSTITUTIONAL NEGATIVE: 1
HEADACHES: 0
ABDOMINAL PAIN: 0
PALPITATIONS: 0
SLEEP DISTURBANCE: 1

## 2020-09-14 NOTE — PROGRESS NOTES
"Chelle Noyola is a 35 year old female who is being evaluated via a billable video visit.      The patient has been notified of following:     \"This video visit will be conducted via a call between you and your physician/provider. We have found that certain health care needs can be provided without the need for an in-person physical exam.  This service lets us provide the care you need with a video conversation.  If a prescription is necessary we can send it directly to your pharmacy.  If lab work is needed we can place an order for that and you can then stop by our lab to have the test done at a later time.    Video visits are billed at different rates depending on your insurance coverage.  Please reach out to your insurance provider with any questions.    If during the course of the call the physician/provider feels a video visit is not appropriate, you will not be charged for this service.\"    Patient has given verbal consent for Video visit? Yes  How would you like to obtain your AVS? Mail a copy  If you are dropped from the video visit, the video invite should be resent to: Text to cell phone: [phone]  Will anyone else be joining your video visit? No    Subjective     Chelle Noyola is a 35 year old female who presents today via video visit for the following health issues:    HPI    Medication Followup of Concerta    Taking Medication as prescribed: yes    Side Effects:  None    Medication Helping Symptoms:  yes       Video Start Time: 2:35 PM    Long standing use of medication.  Doesn't use long acting every day, wondering about having a few short acting on hand for days when she doesn't need a full day's dose.  Does sometimes notice that she has trouble winding down at the end of the day.    Review of Systems   Constitutional: Negative.    Cardiovascular: Negative for palpitations.   Gastrointestinal: Negative for abdominal pain.   Neurological: Negative for headaches.   Psychiatric/Behavioral: Positive for " sleep disturbance. The patient is not nervous/anxious.             Objective           Vitals:  No vitals were obtained today due to virtual visit.    Physical Exam     GENERAL: Healthy, alert and no distress  EYES: Eyes grossly normal to inspection.  No discharge or erythema, or obvious scleral/conjunctival abnormalities.  RESP: No audible wheeze, cough, or visible cyanosis.  No visible retractions or increased work of breathing.    SKIN: Visible skin clear. No significant rash, abnormal pigmentation or lesions.  NEURO: Cranial nerves grossly intact.  Mentation and speech appropriate for age.  PSYCH: Mentation appears normal, affect normal/bright, judgement and insight intact, normal speech and appearance well-groomed.      (F90.8) ADHD, adult residual type  (primary encounter diagnosis)  Comment: will trial 20mg BID, ideally this will work well and she canuse 1 or 2 tabs daily as needed.  Also providing regular concerta in case the experiement is a failure.  Plan: methylphenidate (RITALIN) 20 MG tablet,         methylphenidate (CONCERTA) 36 MG CR tablet                     Video-Visit Details    Type of service:  Video Visit    Video End Time:2:43 PM    Originating Location (pt. Location): Home    Distant Location (provider location):  [facility] [employer]     Platform used for Video Visit: [employer]

## 2020-10-16 ENCOUNTER — MYC MEDICAL ADVICE (OUTPATIENT)
Dept: FAMILY MEDICINE | Facility: CLINIC | Age: 35
End: 2020-10-16

## 2020-10-16 NOTE — PROGRESS NOTES
Pre-Visit Planning     Future Appointments   Date Time Provider Department Center   10/19/2020  8:40 AM Steve Echevarria MD [facility]     Arrival Time for this Appointment:  8:25 AM   Appointment Notes for this encounter:   SBOK#adhd f/u    Questionnaires Reviewed/Assigned  No additional questionnaires are needed    Last OV with provider  9/14/20  (F90.8) ADHD, adult residual type  (primary encounter diagnosis)  Comment: will trial 20mg BID, ideally this will work well and she canuse 1 or 2 tabs daily as needed.  Also providing regular concerta in case the experiement is a failure.  Plan: methylphenidate (RITALIN) 20 MG tablet,         methylphenidate (CONCERTA) 36 MG CR tablet    Patient preferred phone number: [phone]    Sent [employer] message

## 2020-10-19 ENCOUNTER — VIRTUAL VISIT (OUTPATIENT)
Dept: FAMILY MEDICINE | Facility: CLINIC | Age: 35
End: 2020-10-19
Payer: COMMERCIAL

## 2020-10-19 ENCOUNTER — TELEPHONE (OUTPATIENT)
Dept: FAMILY MEDICINE | Facility: CLINIC | Age: 35
End: 2020-10-19

## 2020-10-19 DIAGNOSIS — F90.8 ADHD, ADULT RESIDUAL TYPE: Primary | ICD-10-CM

## 2020-10-19 PROCEDURE — 99214 OFFICE O/P EST MOD 30 MIN: CPT | Mod: 95 | Performed by: FAMILY MEDICINE

## 2020-10-19 RX ORDER — METHYLPHENIDATE HYDROCHLORIDE 10 MG/1
TABLET ORAL
COMMUNITY
Start: 2020-09-14 | End: 2020-10-19

## 2020-10-19 RX ORDER — METHYLPHENIDATE HYDROCHLORIDE 20 MG/1
20 TABLET ORAL 2 TIMES DAILY
Qty: 48 TABLET | Refills: 0 | Status: SHIPPED | OUTPATIENT
Start: 2020-10-19 | End: 2021-01-17

## 2020-10-19 RX ORDER — METHYLPHENIDATE HYDROCHLORIDE 36 MG/1
36 TABLET ORAL EVERY MORNING
Qty: 60 TABLET | Refills: 0 | Status: SHIPPED | OUTPATIENT
Start: 2020-10-19 | End: 2021-01-17

## 2020-10-19 RX ORDER — METHYLPHENIDATE HYDROCHLORIDE 36 MG/1
TABLET ORAL
COMMUNITY
End: 2020-10-19

## 2020-10-19 RX ORDER — BUPROPION HYDROCHLORIDE 150 MG/1
150 TABLET ORAL DAILY
COMMUNITY
Start: 2020-10-09 | End: 2021-04-26

## 2020-10-19 RX ORDER — METHYLPHENIDATE HYDROCHLORIDE 10 MG/1
20 TABLET ORAL DAILY
COMMUNITY
Start: 2020-10-19 | End: 2021-02-01

## 2020-10-19 ASSESSMENT — ENCOUNTER SYMPTOMS
HEADACHES: 0
SLEEP DISTURBANCE: 0
NERVOUS/ANXIOUS: 0
PALPITATIONS: 0
CONSTITUTIONAL NEGATIVE: 1
ABDOMINAL PAIN: 0

## 2020-10-19 NOTE — TELEPHONE ENCOUNTER
Called pt, left msg to call clinic, need to help patient schedule appointment.     Return in about 6 months (around 4/19/2021) for Physical Exam, ADHD, Face to face.    Ruth Behrens.

## 2020-10-19 NOTE — PROGRESS NOTES
"Chelle Noyola is a 35 year old female who is being evaluated via a billable video visit.      The patient has been notified of following:     \"This video visit will be conducted via a call between you and your physician/provider. We have found that certain health care needs can be provided without the need for an in-person physical exam.  This service lets us provide the care you need with a video conversation.  If a prescription is necessary we can send it directly to your pharmacy.  If lab work is needed we can place an order for that and you can then stop by our lab to have the test done at a later time.    Video visits are billed at different rates depending on your insurance coverage.  Please reach out to your insurance provider with any questions.    If during the course of the call the physician/provider feels a video visit is not appropriate, you will not be charged for this service.\"    Patient has given verbal consent for Video visit? Yes  How would you like to obtain your AVS? MyChart  If you are dropped from the video visit, the video invite should be resent to: Text to cell phone: [phone]  Will anyone else be joining your video visit? No    Subjective     Chelle Noyola is a 35 year old female who presents today via video visit for the following health issues:    HPI     Medication Followup of ADD    Taking Medication as prescribed: yes    Side Effects:  None    Medication Helping Symptoms:  yes          Video Start Time: 8:39 AM    Last month wanted to trial short acting medication to allow for more precise dosing of her medication.  Works well enough for days off, but finds that better than the long acting.  Will use use 2 tabs, but makes sure to not take the second too late int he day.  Tolerating well without HA, abd pain, anxiety, sleep disruption.    However, when working prefers the long acting.      Review of Systems   Constitutional: Negative.    Cardiovascular: Negative for palpitations. "   Gastrointestinal: Negative for abdominal pain.   Neurological: Negative for headaches.   Psychiatric/Behavioral: Negative for sleep disturbance. The patient is not nervous/anxious.             Objective           Vitals:  No vitals were obtained today due to virtual visit.    Physical Exam     GENERAL: Healthy, alert and no distress  EYES: Eyes grossly normal to inspection.  No discharge or erythema, or obvious scleral/conjunctival abnormalities.  RESP: No audible wheeze, cough, or visible cyanosis.  No visible retractions or increased work of breathing.    SKIN: Visible skin clear. No significant rash, abnormal pigmentation or lesions.  NEURO: Cranial nerves grossly intact.  Mentation and speech appropriate for age.  PSYCH: Mentation appears normal, affect normal/bright, judgement and insight intact, normal speech and appearance well-groomed.              Assessment and Plan    (F90.8) ADHD, adult residual type  (primary encounter diagnosis)  Comment: Will use both types going ahead - long acting Concerta for works days, short acting for weekends.  Given complexity of dosing will just provide 3m Rx for each.  May call for refill in 3m, CPE and OV in 6m  Plan: methylphenidate (CONCERTA) 36 MG CR tablet,         methylphenidate (RITALIN) 20 MG tablet              RTC in 6m    Steve Echevarria MD        Video-Visit Details    Type of service:  Video Visit    Video End Time:0856    Originating Location (pt. Location): Home    Distant Location (provider location):  [facility]     Platform used for Video Visit: "[employer]

## 2020-11-04 ENCOUNTER — TELEPHONE (OUTPATIENT)
Dept: FAMILY MEDICINE | Facility: CLINIC | Age: 35
End: 2020-11-04

## 2020-11-04 DIAGNOSIS — F90.8 ADHD, ADULT RESIDUAL TYPE: ICD-10-CM

## 2020-11-04 NOTE — TELEPHONE ENCOUNTER
[facility] calls, need to confirm concerta rx, written for 1 daily with quantity of #60, resend with updated directions and/or quantity, may close if okay    Nilsa Lorenzana RN, BSN  Message handled by CLINIC NURSE.

## 2020-11-05 RX ORDER — METHYLPHENIDATE HYDROCHLORIDE 36 MG/1
36 TABLET ORAL EVERY MORNING
Refills: 0 | OUTPATIENT
Start: 2020-11-05

## 2020-11-05 NOTE — TELEPHONE ENCOUNTER
Left a detailed message on pharmacy voice mail that rx is correct and that it is a 3 month supply as patient does not take it on a regular basis.    Katelyn Ryan RN

## 2020-11-18 ENCOUNTER — TELEPHONE (OUTPATIENT)
Dept: ORTHOPEDICS | Facility: CLINIC | Age: 35
End: 2020-11-18

## 2020-11-18 NOTE — TELEPHONE ENCOUNTER
Patient returned Ashley's call. She was interested in getting bilateral carpal tunnel taken care of at [place] as previously discussed with Dr. Yeo. She has put it off due to COVID19.     Last office visit plan of 1/3/20:   An EMG order was placed today with Dr. Mcgregor's office.  I will call with results.  Patient is planning on having the ultrasound guided carpal tunnel release at the [facility] at her earliest convenience.    Informed that she needs to have EMG studies done first in order to proceed. Discussed that order is good for one year. Provided number to call Dr. Mcgregor's office and recommended she contact insurance to make sure it is in network.   Asked that she call 1 week after EMG's are done to see if we have received results.   She verbalized understanding.     BERNARDINO Larios RN

## 2020-11-18 NOTE — TELEPHONE ENCOUNTER
Message from Chelle, would like to speak to someone about carpal tunnel injection or referral for laser treatment.  Please call back.

## 2020-11-25 DIAGNOSIS — F43.10 PTSD (POST-TRAUMATIC STRESS DISORDER): ICD-10-CM

## 2020-11-25 NOTE — TELEPHONE ENCOUNTER
Routing refill request to provider for review/approval because:  Anti-Seizure Meds Protocol [id number]/25/2020 07:49 AM   Review Authorizing provider's last note.  Protocol Details    Normal CBC on file in past 26 months     Normal ALT or AST on file in past 26 months      Julia Jones, RN   [facility] -- Triage Nurse

## 2020-11-30 RX ORDER — LAMOTRIGINE 200 MG/1
TABLET ORAL
Qty: 180 TABLET | Refills: 0 | Status: SHIPPED | OUTPATIENT
Start: 2020-11-30 | End: 2021-02-18

## 2020-12-20 ENCOUNTER — HEALTH MAINTENANCE LETTER (OUTPATIENT)
Age: 35
End: 2020-12-20

## 2021-02-01 ENCOUNTER — MYC MEDICAL ADVICE (OUTPATIENT)
Dept: FAMILY MEDICINE | Facility: CLINIC | Age: 36
End: 2021-02-01

## 2021-02-01 DIAGNOSIS — F90.8 ADHD, ADULT RESIDUAL TYPE: Primary | ICD-10-CM

## 2021-02-01 RX ORDER — METHYLPHENIDATE HYDROCHLORIDE 36 MG/1
36 TABLET ORAL EVERY MORNING
Qty: 60 TABLET | Refills: 0 | Status: SHIPPED | OUTPATIENT
Start: 2021-02-01 | End: 2021-04-26

## 2021-02-01 RX ORDER — METHYLPHENIDATE HYDROCHLORIDE 20 MG/1
20 TABLET ORAL DAILY
Qty: 24 TABLET | Refills: 0 | Status: SHIPPED | OUTPATIENT
Start: 2021-02-01 | End: 2021-03-22

## 2021-02-01 NOTE — TELEPHONE ENCOUNTER
Methylephenidate 10 mg (20 mg)      Last Written Prescription Date:    Last Fill Quantity: ,   # refills:   Last Office Visit: 10/19/20  Future Office visit:       Routing refill request to provider for review/approval because:  Drug not on the [facility], P or [facility] refill protocol or controlled substance    - not on PCP's list.     Marilynn CORONADO RN

## 2021-03-22 ENCOUNTER — MYC REFILL (OUTPATIENT)
Dept: FAMILY MEDICINE | Facility: CLINIC | Age: 36
End: 2021-03-22

## 2021-03-22 DIAGNOSIS — F90.8 ADHD, ADULT RESIDUAL TYPE: ICD-10-CM

## 2021-03-23 RX ORDER — METHYLPHENIDATE HYDROCHLORIDE 20 MG/1
20 TABLET ORAL DAILY
Qty: 24 TABLET | Refills: 0 | Status: SHIPPED | OUTPATIENT
Start: 2021-03-23 | End: 2021-03-26 | Stop reason: DRUGHIGH

## 2021-03-26 ENCOUNTER — OFFICE VISIT (OUTPATIENT)
Dept: FAMILY MEDICINE | Facility: CLINIC | Age: 36
End: 2021-03-26
Payer: COMMERCIAL

## 2021-03-26 VITALS
DIASTOLIC BLOOD PRESSURE: 84 MMHG | OXYGEN SATURATION: 100 % | HEIGHT: 67 IN | TEMPERATURE: 98.3 F | WEIGHT: 153.2 LBS | BODY MASS INDEX: 24.04 KG/M2 | RESPIRATION RATE: 18 BRPM | HEART RATE: 82 BPM | SYSTOLIC BLOOD PRESSURE: 118 MMHG

## 2021-03-26 DIAGNOSIS — R61 DIAPHORESIS: Primary | ICD-10-CM

## 2021-03-26 DIAGNOSIS — F90.8 ADHD, ADULT RESIDUAL TYPE: ICD-10-CM

## 2021-03-26 LAB — TSH SERPL DL<=0.005 MIU/L-ACNC: 0.8 MU/L (ref 0.4–4)

## 2021-03-26 PROCEDURE — 84443 ASSAY THYROID STIM HORMONE: CPT | Performed by: FAMILY MEDICINE

## 2021-03-26 PROCEDURE — 99214 OFFICE O/P EST MOD 30 MIN: CPT | Mod: 95 | Performed by: FAMILY MEDICINE

## 2021-03-26 PROCEDURE — 36415 COLL VENOUS BLD VENIPUNCTURE: CPT | Performed by: FAMILY MEDICINE

## 2021-03-26 RX ORDER — METHYLPHENIDATE HYDROCHLORIDE 20 MG/1
20 TABLET ORAL 3 TIMES DAILY
Qty: 90 TABLET | Refills: 0 | Status: SHIPPED | OUTPATIENT
Start: 2021-03-26 | End: 2021-04-26

## 2021-03-26 ASSESSMENT — MIFFLIN-ST. JEOR: SCORE: 1407.54

## 2021-03-26 ASSESSMENT — ENCOUNTER SYMPTOMS
CONSTITUTIONAL NEGATIVE: 1
HEADACHES: 0
GASTROINTESTINAL NEGATIVE: 1
NERVOUS/ANXIOUS: 0
DECREASED CONCENTRATION: 1
SLEEP DISTURBANCE: 0

## 2021-03-26 ASSESSMENT — PATIENT HEALTH QUESTIONNAIRE - PHQ9
5. POOR APPETITE OR OVEREATING: MORE THAN HALF THE DAYS
SUM OF ALL RESPONSES TO PHQ QUESTIONS 1-9: 12

## 2021-03-26 ASSESSMENT — ANXIETY QUESTIONNAIRES
GAD7 TOTAL SCORE: 7
1. FEELING NERVOUS, ANXIOUS, OR ON EDGE: SEVERAL DAYS
5. BEING SO RESTLESS THAT IT IS HARD TO SIT STILL: MORE THAN HALF THE DAYS
7. FEELING AFRAID AS IF SOMETHING AWFUL MIGHT HAPPEN: NOT AT ALL
3. WORRYING TOO MUCH ABOUT DIFFERENT THINGS: NOT AT ALL
2. NOT BEING ABLE TO STOP OR CONTROL WORRYING: NOT AT ALL
6. BECOMING EASILY ANNOYED OR IRRITABLE: MORE THAN HALF THE DAYS
IF YOU CHECKED OFF ANY PROBLEMS ON THIS QUESTIONNAIRE, HOW DIFFICULT HAVE THESE PROBLEMS MADE IT FOR YOU TO DO YOUR WORK, TAKE CARE OF THINGS AT HOME, OR GET ALONG WITH OTHER PEOPLE: SOMEWHAT DIFFICULT

## 2021-03-26 NOTE — PROGRESS NOTES
"    Assessment and Plan    (R61) Diaphoresis  (primary encounter diagnosis)  Comment:   Plan: TSH with free T4 reflex            (F90.8) ADHD, adult residual type  Comment: trying using only ritalin, up to TID  Plan: methylphenidate (RITALIN) 20 MG tablet              RTC in 1m for ADHD f/u    Steve Echevarria MD      Kumar Corbett is a 36 year old who presents for the following health issues     HPI     Medication Followup of Concerta and Ritilan    Taking Medication as prescribed: yes    Side Effects:  None    Medication Helping Symptoms:  yes     Concerns of needing to up dose of Ritalin and Concerta. States her anxiety/depression has been stable.      Doesn't feel like the Concerta is working well, really has only been using the short acting.  That works better but feels the dose is too low.  Ritalin 20mg BID.  Does typically use this twice daily.  Does feel like she needs just a bit more of the 25mg   (Does note that Vyvanse worked best but was not covered by her insurance.)  lep has always been problematic, no change.  No HA, abd pain.    Notes frequent night sweats.  Periods are variable, has Paragard IUD.  Nots that she is often sweaty and warm and that this is a change.  No palpitations, weight change, change in bowel habits in general, but has had loose stools for the last several days.        Review of Systems   Constitutional: Negative.    Gastrointestinal: Negative.    Neurological: Negative for headaches.   Psychiatric/Behavioral: Positive for decreased concentration. Negative for mood changes and sleep disturbance. The patient is not nervous/anxious.             Objective    /84 (BP Location: Right arm, Patient Position: Chair, Cuff Size: Adult Regular)   Pulse 82   Temp 98.3  F (36.8  C) (Oral)   Resp 18   Ht 1.702 m (5' 7\")   Wt 69.5 kg (153 lb 3.2 oz)   SpO2 100%   BMI 23.99 kg/m    Body mass index is 23.99 kg/m .  Physical Exam  Vitals signs reviewed.   Neck:      Thyroid: No " thyromegaly.   Cardiovascular:      Rate and Rhythm: Normal rate and regular rhythm.      Heart sounds: Normal heart sounds.   Pulmonary:      Effort: Pulmonary effort is normal.      Breath sounds: Normal breath sounds.   Skin:     General: Skin is warm and dry.   Neurological:      Mental Status: She is alert and oriented to person, place, and time.   Psychiatric:         Behavior: Behavior normal.

## 2021-03-27 ASSESSMENT — ANXIETY QUESTIONNAIRES: GAD7 TOTAL SCORE: 7

## 2021-04-26 ENCOUNTER — VIRTUAL VISIT (OUTPATIENT)
Dept: FAMILY MEDICINE | Facility: CLINIC | Age: 36
End: 2021-04-26
Payer: COMMERCIAL

## 2021-04-26 DIAGNOSIS — F41.1 GENERALIZED ANXIETY DISORDER: ICD-10-CM

## 2021-04-26 DIAGNOSIS — F90.8 ADHD, ADULT RESIDUAL TYPE: Primary | ICD-10-CM

## 2021-04-26 PROCEDURE — 99214 OFFICE O/P EST MOD 30 MIN: CPT | Mod: 95 | Performed by: FAMILY MEDICINE

## 2021-04-26 RX ORDER — METHYLPHENIDATE HYDROCHLORIDE 20 MG/1
20 TABLET ORAL 3 TIMES DAILY
Qty: 90 TABLET | Refills: 0 | Status: SHIPPED | OUTPATIENT
Start: 2021-05-27 | End: 2021-06-26

## 2021-04-26 RX ORDER — METHYLPHENIDATE HYDROCHLORIDE 20 MG/1
20 TABLET ORAL 3 TIMES DAILY
Qty: 90 TABLET | Refills: 0 | Status: SHIPPED | OUTPATIENT
Start: 2021-06-27 | End: 2021-07-27

## 2021-04-26 RX ORDER — BUPROPION HYDROCHLORIDE 150 MG/1
300 TABLET ORAL DAILY
Qty: 60 TABLET | Refills: 0 | Status: SHIPPED | OUTPATIENT
Start: 2021-04-26 | End: 2021-04-27

## 2021-04-26 RX ORDER — METHYLPHENIDATE HYDROCHLORIDE 20 MG/1
20 TABLET ORAL 3 TIMES DAILY
Qty: 90 TABLET | Refills: 0 | Status: SHIPPED | OUTPATIENT
Start: 2021-04-26 | End: 2021-05-26

## 2021-04-26 RX ORDER — CLONIDINE HYDROCHLORIDE 0.1 MG/1
0.1 TABLET ORAL 2 TIMES DAILY
Qty: 180 TABLET | Refills: 1 | Status: SHIPPED | OUTPATIENT
Start: 2021-04-26 | End: 2022-06-30

## 2021-04-26 ASSESSMENT — ANXIETY QUESTIONNAIRES
7. FEELING AFRAID AS IF SOMETHING AWFUL MIGHT HAPPEN: NOT AT ALL
IF YOU CHECKED OFF ANY PROBLEMS ON THIS QUESTIONNAIRE, HOW DIFFICULT HAVE THESE PROBLEMS MADE IT FOR YOU TO DO YOUR WORK, TAKE CARE OF THINGS AT HOME, OR GET ALONG WITH OTHER PEOPLE: SOMEWHAT DIFFICULT
2. NOT BEING ABLE TO STOP OR CONTROL WORRYING: NOT AT ALL
GAD7 TOTAL SCORE: 5
1. FEELING NERVOUS, ANXIOUS, OR ON EDGE: SEVERAL DAYS
3. WORRYING TOO MUCH ABOUT DIFFERENT THINGS: SEVERAL DAYS
5. BEING SO RESTLESS THAT IT IS HARD TO SIT STILL: NOT AT ALL
6. BECOMING EASILY ANNOYED OR IRRITABLE: MORE THAN HALF THE DAYS

## 2021-04-26 ASSESSMENT — ENCOUNTER SYMPTOMS
GASTROINTESTINAL NEGATIVE: 1
SLEEP DISTURBANCE: 0
CONSTITUTIONAL NEGATIVE: 1
DYSPHORIC MOOD: 1
NERVOUS/ANXIOUS: 0
NEUROLOGICAL NEGATIVE: 1

## 2021-04-26 ASSESSMENT — PATIENT HEALTH QUESTIONNAIRE - PHQ9
5. POOR APPETITE OR OVEREATING: SEVERAL DAYS
SUM OF ALL RESPONSES TO PHQ QUESTIONS 1-9: 12

## 2021-04-26 NOTE — PROGRESS NOTES
Chelle is a 36 year old who is being evaluated via a billable video visit.      How would you like to obtain your AVS? MyChart  If the video visit is dropped, the invitation should be resent by: Text to cell phone: [phone]  Will anyone else be joining your video visit? No    Video Start Time: 2:44 PM    Assessment and Plan    (F90.8) ADHD, adult residual type  (primary encounter diagnosis)  Comment: 3m refills provided.  May call for another 3m cycle, OV in 6m    Plan: cloNIDine (CATAPRES) 0.1 MG tablet,         methylphenidate (RITALIN) 20 MG tablet,         methylphenidate (RITALIN) 20 MG tablet,         methylphenidate (RITALIN) 20 MG tablet            (F41.1) Generalized anxiety disorder  Comment: bumping up Wellbutrin a bit  Plan: FLUoxetine (PROZAC) 20 MG capsule, buPROPion         (WELLBUTRIN XL) 150 MG 24 hr tablet            RTC in 1m for mood f/u    Steve Echevarria MD      Subjective   Chelle is a 36 year old who presents for the following health issues     HPI     Depression and Anxiety Follow-Up    How are you doing with your depression since your last visit? Goes up and down- would like to discuss increase of the Wellbutrin     How are you doing with your anxiety since your last visit?  No change    Are you having other symptoms that might be associated with depression or anxiety? Yes:  loss of interest     Have you had a significant life event? No     Do you have any concerns with your use of alcohol or other drugs? No    Social History     Tobacco Use     Smoking status: Current Some Day Smoker     Smokeless tobacco: Never Used     Tobacco comment: every now and then   Substance Use Topics     Alcohol use: Yes     Frequency: 2-3 times a week     Comment: 2 Drinks per Week     Drug use: No     Comment: None     PHQ 9/30/2019 11/18/2019 3/26/2021   PHQ-9 Total Score 7 9 12   Q9: Thoughts of better off dead/self-harm past 2 weeks Not at all Not at all Not at all     ISABEL-7 SCORE 9/30/2019 11/18/2019  3/26/2021   Total Score 7 (mild anxiety) 7 (mild anxiety) -   Total Score 7 7 7     Last PHQ-9 4/26/2021   1.  Little interest or pleasure in doing things 2   2.  Feeling down, depressed, or hopeless 2   3.  Trouble falling or staying asleep, or sleeping too much 3   4.  Feeling tired or having little energy 2   5.  Poor appetite or overeating 1   6.  Feeling bad about yourself 1   7.  Trouble concentrating 1   8.  Moving slowly or restless 0   Q9: Thoughts of better off dead/self-harm past 2 weeks 0   PHQ-9 Total Score 12   Difficulty at work, home, or with people Somewhat difficult     ISABEL-7  4/26/2021   1. Feeling nervous, anxious, or on edge 1   2. Not being able to stop or control worrying 0   3. Worrying too much about different things 1   4. Trouble relaxing 1   5. Being so restless that it is hard to sit still 0   6. Becoming easily annoyed or irritable 2   7. Feeling afraid, as if something awful might happen 0   ISABEL-7 Total Score 5   If you checked any problems, how difficult have they made it for you to do your work, take care of things at home, or get along with other people? Somewhat difficult       Noting more issues with mood - less interest, get-up-and-go, more irritable.  Is currently using 20mg of fluoxetine, 150mg of bupropion XL    Suicide Assessment Five-step Evaluation and Treatment (SAFE-T)      How many servings of fruits and vegetables do you eat daily?  2-3    On average, how many sweetened beverages do you drink each day (Examples: soda, juice, sweet tea, etc.  Do NOT count diet or artificially sweetened beverages)?   0-1    How many days per week do you exercise enough to make your heart beat faster? 3 or less    How many minutes a day do you exercise enough to make your heart beat faster? 30 - 60    How many days per week do you miss taking your medication? 0    Medication Followup of Concerta     Taking Medication as prescribed: yes    Side Effects:  Tired feeling     Medication Helping  Symptoms:  yes     We were going to try a period of using just the short acting Ritalin.  Will typically use twice daily, with the third or half of a third a few times a week.  No issues with this approach would like to continue with this for a while.    Review of Systems   Constitutional: Negative.    Gastrointestinal: Negative.    Neurological: Negative.    Psychiatric/Behavioral: Positive for dysphoric mood and mood changes. Negative for sleep disturbance and suicidal ideas. The patient is not nervous/anxious.             Objective           Vitals:  No vitals were obtained today due to virtual visit.    Physical Exam   GENERAL: Healthy, alert and no distress  EYES: Eyes grossly normal to inspection.  No discharge or erythema, or obvious scleral/conjunctival abnormalities.  RESP: No audible wheeze, cough, or visible cyanosis.  No visible retractions or increased work of breathing.    SKIN: Visible skin clear. No significant rash, abnormal pigmentation or lesions.  NEURO: Cranial nerves grossly intact.  Mentation and speech appropriate for age.  PSYCH: Mentation appears normal, affect normal/bright, judgement and insight intact, normal speech and appearance well-groomed.                Video-Visit Details    Type of service:  Video Visit    Video End Time:2:56 PM    Originating Location (pt. Location): Home    Distant Location (provider location):  [facility] [employer]     Platform used for Video Visit: [employer]

## 2021-04-27 ENCOUNTER — TELEPHONE (OUTPATIENT)
Dept: FAMILY MEDICINE | Facility: CLINIC | Age: 36
End: 2021-04-27

## 2021-04-27 DIAGNOSIS — F41.1 GENERALIZED ANXIETY DISORDER: ICD-10-CM

## 2021-04-27 RX ORDER — BUPROPION HYDROCHLORIDE 150 MG/1
300 TABLET ORAL DAILY
Qty: 60 TABLET | Refills: 0 | Status: SHIPPED | OUTPATIENT
Start: 2021-04-27 | End: 2021-10-04

## 2021-04-27 ASSESSMENT — ANXIETY QUESTIONNAIRES: GAD7 TOTAL SCORE: 5

## 2021-04-27 NOTE — TELEPHONE ENCOUNTER
Pharmacist calling regarding Buproprion dose as the current rx has 2 different directions.  Please clarify.  GAEL Padron R.N.    buPROPion (WELLBUTRIN XL) 150 MG 24 hr tablet 60 tablet 0 4/26/2021     Sig - Route: Take 2 tablets (300 mg) by mouth daily 1 tab qd - Oral           [facility] [phone]

## 2021-08-11 ENCOUNTER — OFFICE VISIT (OUTPATIENT)
Dept: ORTHOPEDICS | Facility: CLINIC | Age: 36
End: 2021-08-11
Payer: COMMERCIAL

## 2021-08-11 VITALS
WEIGHT: 148 LBS | BODY MASS INDEX: 23.23 KG/M2 | SYSTOLIC BLOOD PRESSURE: 122 MMHG | HEIGHT: 67 IN | DIASTOLIC BLOOD PRESSURE: 80 MMHG

## 2021-08-11 DIAGNOSIS — G43.709 CHRONIC MIGRAINE WITHOUT AURA WITHOUT STATUS MIGRAINOSUS, NOT INTRACTABLE: ICD-10-CM

## 2021-08-11 DIAGNOSIS — S16.1XXD CERVICAL MYOFASCIAL STRAIN, SUBSEQUENT ENCOUNTER: Primary | ICD-10-CM

## 2021-08-11 PROCEDURE — 20552 NJX 1/MLT TRIGGER POINT 1/2: CPT | Performed by: FAMILY MEDICINE

## 2021-08-11 PROCEDURE — 99214 OFFICE O/P EST MOD 30 MIN: CPT | Mod: 25 | Performed by: FAMILY MEDICINE

## 2021-08-11 PROCEDURE — 76942 ECHO GUIDE FOR BIOPSY: CPT | Performed by: FAMILY MEDICINE

## 2021-08-11 RX ORDER — METHYLPREDNISOLONE ACETATE 40 MG/ML
40 INJECTION, SUSPENSION INTRA-ARTICULAR; INTRALESIONAL; INTRAMUSCULAR; SOFT TISSUE
Status: DISCONTINUED | OUTPATIENT
Start: 2021-08-11 | End: 2022-06-30

## 2021-08-11 RX ADMIN — METHYLPREDNISOLONE ACETATE 40 MG: 40 INJECTION, SUSPENSION INTRA-ARTICULAR; INTRALESIONAL; INTRAMUSCULAR; SOFT TISSUE at 16:18

## 2021-08-11 RX ADMIN — METHYLPREDNISOLONE ACETATE 40 MG: 40 INJECTION, SUSPENSION INTRA-ARTICULAR; INTRALESIONAL; INTRAMUSCULAR; SOFT TISSUE at 16:19

## 2021-08-11 ASSESSMENT — MIFFLIN-ST. JEOR: SCORE: 1383.95

## 2021-08-11 NOTE — PATIENT INSTRUCTIONS
1. Cervical myofascial strain, subsequent encounter    2. Chronic migraine without aura without status migrainosus, not intractable      -Patient is following up for chronic bilateral neck pain due to mechanical strain  -Patient tolerated bilateral trapezius trigger point injections today without complications.  Patient was given postprocedure instruction  -Patient will start formal physical therapy and home exercise program  -Patient has history of chronic migraines and has had Botox injections which has helped.  Patient is interested in having repeat Botox injection performed.  Pain management order for Botox injection was placed today  -Patient will follow up if pain does not improve  -Call direct clinic number [[phone]] at any time with questions or concerns.    Albert Yeo MD [facility]  [facility]

## 2021-08-11 NOTE — PROGRESS NOTES
ASSESSMENT & PLAN  Patient Instructions     1. Cervical myofascial strain, subsequent encounter    2. Chronic migraine without aura without status migrainosus, not intractable      -Patient is following up for chronic bilateral neck pain due to mechanical strain  -Patient tolerated bilateral trapezius trigger point injections today without complications.  Patient was given postprocedure instruction  -Patient will start formal physical therapy and home exercise program  -Patient has history of chronic migraines and has had Botox injections which has helped.  Patient is interested in having repeat Botox injection performed.  Pain management order for Botox injection was placed today  -Patient will follow up if pain does not improve  -Call direct clinic number [[phone]] at any time with questions or concerns.    Albert Yeo MD [facility]  [facility]          -----    SUBJECTIVE:  Chelle Noyola is a 36 year old female who is seen in follow-up for chronic musculoskeletal neck and upper back pains .They were last seen on 1/3/2020 and had trigger point injections.     Since their last visit reports 0% - (About the same as last time). States neck will flair up and down from work and just gets tight and states will get migraines from the pain They indicate that their current pain level is 4/10. They have tried other medications: baclofen, corticosteroid injection (most recent date: 01/03/2020) that provided unknown amount of relief, chiropractic care (10 plus  visits) and massages.      Patient also reports chronic migraines and has had a botox injection which has helped significantly.  Patient is interested   The patient is seen by themselves.    Patient's past medical, surgical, social, and family histories were reviewed today and no changes are noted.    REVIEW OF SYSTEMS:  Constitutional: NEGATIVE for fever, chills, change in weight  Skin: NEGATIVE for worrisome  "rashes, moles or lesions  GI/: NEGATIVE for bowel or bladder changes  Neuro: NEGATIVE for weakness, dizziness or paresthesias    OBJECTIVE:  /80   Ht 1.702 m (5' 7\")   Wt 67.1 kg (148 lb)   BMI 23.18 kg/m     General: healthy, alert and in no distress  HEENT: no scleral icterus or conjunctival erythema  Skin: no suspicious lesions or rash. No jaundice.  CV: regular rhythm by palpation, no pedal edema  Resp: normal respiratory effort without conversational dyspnea   Psych: normal mood and affect  Gait: normal steady gait with appropriate coordination and balance  Neuro: normal light touch sensory exam of the extremities.    MSK:  CERVICAL SPINE  Inspection:    normal cervical lordosis present, rounded shoulders, forward head posture  Palpation:    Tender about the paracervical musculature (bilateral), levator scapula (bilateral), upper trapezius (bilateral) and lower trapezius (bilateral) with significant spasms noted (left worse than right). Otherwise remainder of the landmarks and nontender.  Range of Motion:     Flexion within normal limits    Extension within normal limits    Right side bend within normal limits    Left side bend within normal limits    Right rotation within normal limits    Left rotation within normal limits  Strength:  Grossly intact  Special Tests:    Positive: None    Negative: Spurling's (bilateral), Lopez's (bilateral)     Large Joint Injection/Arthocentesis    Date/Time: 8/11/2021 4:18 PM  Performed by: Yeo, Albert, MD  Authorized by: Yeo, Albert, MD     Indications:  Pain and osteoarthritis  Needle Size:  22 G  Guidance: ultrasound    Approach:  Posterior  Location:  Shoulder   Location comment:  Right trapezius      Medications:  40 mg methylPREDNISolone 40 MG/ML  Outcome:  Tolerated well, no immediate complications  Procedure discussed: discussed risks, benefits, and alternatives    Consent Given by:  Patient  Timeout: timeout called immediately prior to procedure    Prep: " patient was prepped and draped in usual sterile fashion    Large Joint Injection/Arthocentesis    Date/Time: 8/11/2021 4:19 PM  Performed by: Yeo, Albert, MD  Authorized by: Yeo, Albert, MD     Indications:  Pain and osteoarthritis  Needle Size:  22 G  Guidance: ultrasound    Approach:  Posterior  Location:  Shoulder   Location comment:  Left trapezius      Medications:  40 mg methylPREDNISolone 40 MG/ML  Outcome:  Tolerated well, no immediate complications  Procedure discussed: discussed risks, benefits, and alternatives    Consent Given by:  Patient  Timeout: timeout called immediately prior to procedure    Prep: patient was prepped and draped in usual sterile fashion            Patient's conditions were thoroughly discussed during today's visit with total time spent face-to-face with the patient and documentation being 30 minutes.    Albert Yeo MD, [facility]

## 2021-08-11 NOTE — LETTER
8/11/2021         RE: Chelle Noyola  [id number]  [place] [id number]        Dear Colleague,    Thank you for referring your patient, Chelle Noyola, to the [facility] [place]. Please see a copy of my visit note below.    ASSESSMENT & PLAN  Patient Instructions     1. Cervical myofascial strain, subsequent encounter    2. Chronic migraine without aura without status migrainosus, not intractable      -Patient is following up for chronic bilateral neck pain due to mechanical strain  -Patient tolerated bilateral trapezius trigger point injections today without complications.  Patient was given postprocedure instruction  -Patient will start formal physical therapy and home exercise program  -Patient has history of chronic migraines and has had Botox injections which has helped.  Patient is interested in having repeat Botox injection performed.  Pain management order for Botox injection was placed today  -Patient will follow up if pain does not improve  -Call direct clinic number [[phone]] at any time with questions or concerns.    Albert Yeo MD [facility] Medicine  [facility]          -----    SUBJECTIVE:  Chelle Noyola is a 36 year old female who is seen in follow-up for chronic musculoskeletal neck and upper back pains .They were last seen on 1/3/2020 and had trigger point injections.     Since their last visit reports 0% - (About the same as last time). States neck will flair up and down from work and just gets tight and states will get migraines from the pain They indicate that their current pain level is 4/10. They have tried other medications: baclofen, corticosteroid injection (most recent date: 01/03/2020) that provided unknown amount of relief, chiropractic care (10 plus  visits) and massages.      Patient also reports chronic migraines and has had a botox injection which has helped significantly.  Patient is interested  "  The patient is seen by themselves.    Patient's past medical, surgical, social, and family histories were reviewed today and no changes are noted.    REVIEW OF SYSTEMS:  Constitutional: NEGATIVE for fever, chills, change in weight  Skin: NEGATIVE for worrisome rashes, moles or lesions  GI/: NEGATIVE for bowel or bladder changes  Neuro: NEGATIVE for weakness, dizziness or paresthesias    OBJECTIVE:  /80   Ht 1.702 m (5' 7\")   Wt 67.1 kg (148 lb)   BMI 23.18 kg/m     General: healthy, alert and in no distress  HEENT: no scleral icterus or conjunctival erythema  Skin: no suspicious lesions or rash. No jaundice.  CV: regular rhythm by palpation, no pedal edema  Resp: normal respiratory effort without conversational dyspnea   Psych: normal mood and affect  Gait: normal steady gait with appropriate coordination and balance  Neuro: normal light touch sensory exam of the extremities.    MSK:  CERVICAL SPINE  Inspection:    normal cervical lordosis present, rounded shoulders, forward head posture  Palpation:    Tender about the paracervical musculature (bilateral), levator scapula (bilateral), upper trapezius (bilateral) and lower trapezius (bilateral) with significant spasms noted (left worse than right). Otherwise remainder of the landmarks and nontender.  Range of Motion:     Flexion within normal limits    Extension within normal limits    Right side bend within normal limits    Left side bend within normal limits    Right rotation within normal limits    Left rotation within normal limits  Strength:  Grossly intact  Special Tests:    Positive: None    Negative: Spurling's (bilateral), Lopez's (bilateral)     Large Joint Injection/Arthocentesis    Date/Time: 8/11/2021 4:18 PM  Performed by: Yeo, Albert, MD  Authorized by: Yeo, Albert, MD     Indications:  Pain and osteoarthritis  Needle Size:  22 G  Guidance: ultrasound    Approach:  Posterior  Location:  Shoulder   Location comment:  Right " trapezius      Medications:  40 mg methylPREDNISolone 40 MG/ML  Outcome:  Tolerated well, no immediate complications  Procedure discussed: discussed risks, benefits, and alternatives    Consent Given by:  Patient  Timeout: timeout called immediately prior to procedure    Prep: patient was prepped and draped in usual sterile fashion    Large Joint Injection/Arthocentesis    Date/Time: 8/11/2021 4:19 PM  Performed by: Yeo, Albert, MD  Authorized by: Yeo, Albert, MD     Indications:  Pain and osteoarthritis  Needle Size:  22 G  Guidance: ultrasound    Approach:  Posterior  Location:  Shoulder   Location comment:  Left trapezius      Medications:  40 mg methylPREDNISolone 40 MG/ML  Outcome:  Tolerated well, no immediate complications  Procedure discussed: discussed risks, benefits, and alternatives    Consent Given by:  Patient  Timeout: timeout called immediately prior to procedure    Prep: patient was prepped and draped in usual sterile fashion            Patient's conditions were thoroughly discussed during today's visit with total time spent face-to-face with the patient and documentation being 30 minutes.    Albert Yeo MD, [facility]            Again, thank you for allowing me to participate in the care of your patient.        Sincerely,        Albert Yeo, MD

## 2021-08-30 ENCOUNTER — TELEPHONE (OUTPATIENT)
Dept: PALLIATIVE MEDICINE | Facility: CLINIC | Age: 36
End: 2021-08-30

## 2021-08-30 NOTE — TELEPHONE ENCOUNTER
SHANTI to schedule procedure eval.    Josefina KEATING    [facility]

## 2021-08-30 NOTE — TELEPHONE ENCOUNTER
----- Message from Blaire Kim sent at 2021 11:06 AM CDT -----  Regarding: botox  Hello,  I just received a phone call from [facility] that our request will be denied with the clinical information that we have now. They want documentation of the followin headache days per month for at least 3 months AND  8 migraine days per month for at least 3 months AND  One of the following   - Tried and failed a conventional agent prerequisite from at least 2 of the following migraine prophylaxis classes; antidepressants; calcium channel or beta blockers, anticonvulsants, self administered calcitonin gene related peptides   - Or    - Documented intolerance, fda label contraindication, or hypersensitivity to conventional agents from at least 2 of the following migraine prophylaxis classes:   - Antidepressants, calcium channel or beta blockers, anticonculsants, self administered calcitonin gene related peptides,    - AND   Used for migraine prophylaxis AND   Not used in combo with a CGRP agent AND  Prescribed by or in consultation with a headache specialist.     Please let me know when we have that info documented and I can try again.    Thank you,  Blaire

## 2021-09-20 NOTE — PROGRESS NOTES
[facility] Consultation    Date of visit: 9/20/2021      Assessment:  Chelle Noyola is a 36 year old with past medical history including: ADHD, Anxiety, PTSD, Migraine headaches who presents for evaluation for Botox injections for treatment of chronic migraine headaches.    1. Chronic Migraine headaches: Patient reports onset of migraine with Aura when they were about 13 years old. They tried numerous medications which unfortunately worsened their anxiety or were not helpful. They had been getting botox treatments at various neurology clinics including [facility] and most recently [facility] neurology with significant improvement in the severity, duration and number of migraine headaches/month. Currently they report daily severe migraine headaches which limits their activity tolerance.      Plan:  The following recommendations were given to the patient. Diagnosis, treatment options, risks, benefits, and alternatives were discussed, and all questions were answered. The patient expressed understanding of the plan for management.     I am recommending a multidisciplinary treatment plan to help this patient better manage her pain.  This includes:     1. Physical Therapy: Completed previously, continue current HEP.  2. Clinical Health Pain Psychologist: Will discuss at follow-up.   3. Self Care Recommendations: Gentle progressive exercise that does not increase pain - gradually increase daily walking program.  Take mini breaks - 5 minutes of mindfullness a couple times a day.   4. Diagnostic Studies: None  5. Medication Management:   1. Prior authorization for botox completed.  6. Further procedures recommended: Botox injections to be scheduled once PA is completed.  7. Follow up: every 3 months.      DO [facility]          Reason for consultation:    Chelle Noyola is a 36 year old female who is seen in  consultation today at the request of her primary care physician, Steve Echevarria .     Consultation and Evaluation for: botox    Review of [place] Prescription Monitoring Program (): Today I have also reviewed the patient's history of controlled substance use, as provided by [place] licensed pharmacies and prescriber dispensers.     Review of Electronic Chart: Today I have also reviewed available medical information in the patient's medical record at [facility] ([facility] including relevant provider notes, laboratory work, and imaging.     Chelle Noyola has been seen at a pain clinic in the past.  botox injections at neurology clinics    Chief Complaint:    Chief Complaint   Patient presents with     Pain       Pain history:  Chelle Noyola is a 36 year old female who presents for initial evaluation of chief pain complaint of migraine headaches.     They started having migraines as a teenager at that time they had an aura associated with their migraines but this is rare now. They still have sound, smell and light sensitivity. Their headaches start at the temples and radiate in a band around their head and down the back of their neck. They have tried numerous medications but unfortunately these were either not effective or worsened their baseline anxiety.    They started getting botox injections many years ago at [facility]. They have previously had botox injections every 3 months at [facility], [facility] and a couple other providers which did provide significant pain relief.    At baseline without botox they have daily headaches that pretty much last the whole day. When they were getting botox regularly their headache intensity was significantly reduced. They were more function and it was easier for them to work. Currently they do have to take days off occasionally because of migraine headaches.      Onset: During teenage years, [age]  Location/Radiation: temples, radiates around their head to  their back.  Quality: Band like pressure, throbbing, numbness, tingling  Severity/Intensity: 10/10 at worst, 5/10 at best, 7/10 on average  Aggravating factors include: activity, lights, noise  Relieving factors include: sitting in quiet room, sleep  Red Flags: The patient denies bowel or bladder incontinence,  weakness, saddle anesthesia, unintentional weight loss, or fever/chills/sweats.         Pain Treatments:  1. Medications:       Current pain medications:   -Buproprion 300mg daily - helpful for anxiety, not for headaches  -Baclofen 20mg hs prn - helps relax neck muscles, no significant headache relief       Previous pain medications: No significant relief with medications listed below  -Amitriptyline, nortriptyline  -Topiramate, Depakote   -Lamictal 200mg BID   -Gabapentin - nh   -Fluoxetine 20mg daily - made mood worse  -Effexor  -Propranolol, Verapamil  -Diclofenc - nh  -Imitrex  -maxalt  -Aspercreme - temp relief  -CBD - helped a little  2. Physical Therapy: have completed PT previously, no significant relief.     TENS unit: Didn't help  3. Pain psychology: hasn't tried  4. Surgery: hasn't tried  5. Injections: -Trapezius injections with steroid  6. Alternative Therapies:    Chiropractic: no long lasting relief    Acupuncture: hasn't tried      Labs:   Lab Results   Component Value Date    WBC 9.8 06/24/2019     Lab Results   Component Value Date    RBC 3.77 06/24/2019     Lab Results   Component Value Date    HGB 11.9 06/24/2019     Lab Results   Component Value Date    HCT 35.2 06/24/2019     Lab Results   Component Value Date    MCV 93 06/24/2019     Lab Results   Component Value Date    MCH 31.6 06/24/2019     Lab Results   Component Value Date    MCHC 33.8 06/24/2019     Lab Results   Component Value Date    RDW 11.7 06/24/2019     Lab Results   Component Value Date     06/24/2019     Last Comprehensive Metabolic Panel:  Sodium   Date Value Ref Range Status   06/24/2019 133 133 - 144 mmol/L  Final     Potassium   Date Value Ref Range Status   06/24/2019 3.3 (L) 3.4 - 5.3 mmol/L Final     Chloride   Date Value Ref Range Status   06/24/2019 101 94 - 109 mmol/L Final     Carbon Dioxide   Date Value Ref Range Status   06/24/2019 25 20 - 32 mmol/L Final     Anion Gap   Date Value Ref Range Status   06/24/2019 7 3 - 14 mmol/L Final     Glucose   Date Value Ref Range Status   06/24/2019 105 (H) 70 - 99 mg/dL Final     Urea Nitrogen   Date Value Ref Range Status   06/24/2019 8 7 - 30 mg/dL Final     Creatinine   Date Value Ref Range Status   06/24/2019 0.74 0.52 - 1.04 mg/dL Final     GFR Estimate   Date Value Ref Range Status   06/24/2019 >90 >60 mL/min/[1.73_m2] Final     Comment:     Non  GFR Calc  Starting 12/18/2018, serum creatinine based estimated GFR (eGFR) will be   calculated using the Chronic Kidney Disease Epidemiology Collaboration   (CKD-EPI) equation.       Calcium   Date Value Ref Range Status   06/24/2019 7.9 (L) 8.5 - 10.1 mg/dL Final                 Past Medical History:  Past Medical History:   Diagnosis Date     ADHD, adult residual type 3/11/2019     Anxiety 3/11/2019     Elevated blood pressure reading without diagnosis of hypertension 4/16/2019     Generalized anxiety disorder 4/15/2019     Migraine without aura and without status migrainosus, not intractable 3/11/2019     Pain in both upper arms 10/10/2019     PTSD (post-traumatic stress disorder) 4/15/2019       Past Surgical History:  Past Surgical History:   Procedure Laterality Date     ORTHOPEDIC SURGERY  Carpel tunnel release       Medications:  Current Outpatient Medications   Medication Sig Dispense Refill     baclofen (LIORESAL) 20 MG tablet TAKE 1 TABLET BY MOUTH NIGHTLY AS NEEDED FOR MUSCLE SPASMS 30 tablet 0     buPROPion (WELLBUTRIN XL) 150 MG 24 hr tablet Take 2 tablets (300 mg) by mouth daily 60 tablet 0     cloNIDine (CATAPRES) 0.1 MG tablet Take 1 tablet (0.1 mg) by mouth 2 times daily 180 tablet 1      methylphenidate (RITALIN) 20 MG tablet Take 20 mg by mouth 2 times daily       FLUoxetine (PROZAC) 20 MG capsule TAKE 1 CAPSULE BY MOUTH EVERY  capsule 1     lamoTRIgine (LAMICTAL) 200 MG tablet TAKE ONE TABLET BY MOUTH TWICE DAILY  180 tablet 1       Allergies:   No Known Allergies    Social History:  Home situation: lives in [place]  Occupation/Schooling: hair/nail dresser  Tobacco use: occasional  Drug use: denies  Alcohol use: occasional  History of chemical dependency treatment: denies  Mental health admissions: denies    Family history:  Family History   Problem Relation Age of Onset     Anxiety Disorder Mother      Other - See Comments Father 40        Accident     Attention Deficit Disorder Sister      Developmental delay Brother      Attention Deficit Disorder Son        Review of Systems:    POSTIVE IN BOLD  GENERAL: fever/chills, fatigue, general unwell feeling, weight gain/loss.  HEAD/EYES:  headache, dizziness, or vision changes.    EARS/NOSE/THROAT:  Nosebleeds, hearing loss, sinus infection, earache, tinnitus.  IMMUNE:  Allergies, cancer, immune deficiency, or infections.  SKIN:  Urticaria, rash, hives  HEME/Lymphatic:   anemia, easy bruising, easy bleeding.  RESPIRATORY:  cough, wheezing, or shortness of breath  CARDIOVASCULAR/Circulation:  Extremity edema, syncope, hypertension, tachycardia, or angina.  GASTROINTESTINAL:  abdominal pain, nausea/emesis, diarrhea, constipation,  hematochezia, or melena.  ENDOCRINE:  Diabetes, steroid use,  thyroid disease or osteoporosis.  MUSCULOSKELETAL: neck pain, back pain, arthralgia, arthritis, or gout.  GENITOURINARY:  frequency, urgency, dysuria, difficulty voiding, hematuria or incontinence.  NEUROLOGIC:  weakness, numbness, paresthesias, seizure, tremor, stroke or memory loss.  PSYCHIATRIC:  depression, anxiety, stress, suicidal thoughts or mood swings.     Physical Exam:  Vitals:    09/21/21 1005   BP: 127/87   Pulse: 76   SpO2: 100%      Exam:  Constitutional: Well developed, well nourished, appears stated age.  HEENT: Head atraumatic, normocephalic. Eyes without conjunctival injection or jaundice. Oropharynx clear.   Skin: No rash, lesions, or petechiae of exposed skin.   Extremities: Peripheral pulses intact. No clubbing, cyanosis, or edema.  Psychiatric/mental status: Alert, without lethargy or stupor. Speech fluent. Appropriate affect. Mood normal. Able to follow commands without difficulty.     Musculoskeletal exam:  Gait/Station/Posture: Normal stance, arm swing, and stride; no antalgia or Trendelenburg  Normal bulk and tone. Unremarkable spinal curvature.    Cervical spine:  Range of motion within normal limits  Myofascial tenderness: bilateral trapezius  No focal spinous process tenderness.   Spurling's negative bilaterally.          Shoulder exam:  Shoulder range of motion within normal limits.       Neurologic exam:  CN:  Cranial nerves 2-12 are grossly intact  Motor Strength:  5/5 symmetric UE and LE strength        Reflexes:     Biceps C5:     R:  2/4 L: 2/4   Brachioradialis  C6: R:  2/4 L: 2/4   Triceps C7:  R:  2/4 L: 2/4   Patella L4:  R:  2/4 L: 2/4   Achilles S1:  R:  2/4 L: 2/4        Sensory:  (upper and lower extremities):   Light touch: normal       BILLING TIME DOCUMENTATION:   The total TIME spent on this patient on the date of the encounter/appointment was 35 minutes.      TOTAL TIME includes:   Time spent preparing to see the patient (reviewing records and tests)   Time spent face to face (or over the phone) with the patient   Time spent ordering tests, medications, procedures and referrals   Time spent Referring and communicating with other healthcare professionals   Time spent documenting clinical information in Epic     Jett Rosado DO  [facility]

## 2021-09-21 ENCOUNTER — OFFICE VISIT (OUTPATIENT)
Dept: PALLIATIVE MEDICINE | Facility: CLINIC | Age: 36
End: 2021-09-21
Payer: COMMERCIAL

## 2021-09-21 VITALS — DIASTOLIC BLOOD PRESSURE: 87 MMHG | OXYGEN SATURATION: 100 % | HEART RATE: 76 BPM | SYSTOLIC BLOOD PRESSURE: 127 MMHG

## 2021-09-21 DIAGNOSIS — G43.719 INTRACTABLE CHRONIC MIGRAINE WITHOUT AURA AND WITHOUT STATUS MIGRAINOSUS: Primary | ICD-10-CM

## 2021-09-21 PROCEDURE — 99214 OFFICE O/P EST MOD 30 MIN: CPT | Performed by: PHYSICAL MEDICINE & REHABILITATION

## 2021-09-21 RX ORDER — METHYLPHENIDATE HYDROCHLORIDE 20 MG/1
20 TABLET ORAL 2 TIMES DAILY
COMMUNITY
Start: 2020-11-12 | End: 2022-07-14

## 2021-09-21 ASSESSMENT — PAIN SCALES - GENERAL: PAINLEVEL: SEVERE PAIN (6)

## 2021-09-21 NOTE — PATIENT INSTRUCTIONS
1. Once your botox prior authorization is completed, you'll get a call from my schedulers to schedule the injections.    Take care,    Jett Rosado DO  [facility]        ----------------------------------------------------------------  Clinic Number:  [phone]     Call with any questions about your care and for scheduling assistance.     Calls are returned Monday through Friday between 8 AM and 4:30 PM. We usually get back to you within 2 business days depending on the issue/request.    If we are prescribing your medications:    For opioid medication refills, call the clinic or send a Tianji message 7 days in advance.  Please include:    Name of requested medication    Name of the pharmacy.    For non-opioid medications, call your pharmacy directly to request a refill. Please allow 3-4 days to be processed.     Per [place] State Law:    All controlled substance prescriptions must be filled within 30 days of being written.      For those controlled substances allowing refills, pickup must occur within 30 days of last fill.      We believe regular attendance is key to your success in our program!      Any time you are unable to keep your appointment we ask that you call us at least 24 hours in advance to cancel.This will allow us to offer the appointment time to another patient.     Multiple missed appointments may lead to dismissal from the clinic.

## 2021-09-27 DIAGNOSIS — G43.719 INTRACTABLE CHRONIC MIGRAINE WITHOUT AURA AND WITHOUT STATUS MIGRAINOSUS: Primary | ICD-10-CM

## 2021-10-03 ENCOUNTER — HEALTH MAINTENANCE LETTER (OUTPATIENT)
Age: 36
End: 2021-10-03

## 2021-10-04 ENCOUNTER — OFFICE VISIT (OUTPATIENT)
Dept: PALLIATIVE MEDICINE | Facility: CLINIC | Age: 36
End: 2021-10-04
Payer: COMMERCIAL

## 2021-10-04 VITALS — SYSTOLIC BLOOD PRESSURE: 126 MMHG | OXYGEN SATURATION: 100 % | DIASTOLIC BLOOD PRESSURE: 87 MMHG | HEART RATE: 82 BPM

## 2021-10-04 DIAGNOSIS — G43.719 INTRACTABLE CHRONIC MIGRAINE WITHOUT AURA AND WITHOUT STATUS MIGRAINOSUS: Primary | ICD-10-CM

## 2021-10-04 PROCEDURE — 64615 CHEMODENERV MUSC MIGRAINE: CPT | Mod: 50 | Performed by: PAIN MEDICINE

## 2021-10-04 ASSESSMENT — PAIN SCALES - GENERAL: PAINLEVEL: EXTREME PAIN (8)

## 2021-10-04 NOTE — PROGRESS NOTES
[facility] - Procedure Note      Date of Service: 10/4/2021  Pre procedure Diagnosis: Chronic Migraine     Post procedure Diagnosis: Same  Procedure performed: Botox Injections for chronic headaches  : Jett Rosado DO  Anesthesia: none    Indications:    Chelle Noyola is a 36 year old female who presents to clinic today for the botox injections.   They have a history of chronic migraines with >25 headache days/month. They have tried conservative treatment including multiple headache medications and exercises    Options/alternatives, benefits and risks were discussed with the patient including bleeding, infection, pneumothorax, weakness, and headache flare.   Questions were answered and she agrees to proceed. Voluntary informed consent was obtained and signed.     Response to previous Botox treatment:   Last Botox Date: Re-starting botox after delay in prior injections. First botox injections with myself.      1. Headache frequency: 25+ headache days per month.      2. Headache duration during this injection cycle:10+hours daily     3. Headache intensity during this injection cycle:    7/10 = Typical pain level   10/10 = Worst pain level   5/10 = Lowest pain level     4. Change in headache medication usage: None     5. ER Visits During This Injection Cycle: NONE     6. Functional Performance: They are not as active with their friends and family on headache days. Functional improvement to be determined at follow-up.    Vitals were reviewed: Yes  Allergies were reviewed:  Yes    Medications were reviewed:  Yes   Pre-procedure pain score: 8/10    Procedure:  After getting informed consent, a Pause for the Cause was performed.  Patient was prepped and draped with chloroprep.    A 27 gauge needle was used to make the injections.  After negative aspiration, botox was injected bilaterally into the following locations:    1 & 2. SHOULDER GIRDLE & NECK MUSCLES: 50 units Botox = Total  Dose, 2:1 Dilution     Right trapezius - 15 units of Botox at 3 site/s.   Left trapezius  - 15 units of Botox at 3 site/s.     Right Cervical Paraspinals - 10 units of Botox at 2 site/s.   Left Cervical Paraspinals - 10 units of Botox at 2 site/s.     2. HEAD & SCALP MUSCLES: 105 units Botox = Total Dose, 2:1 Dilution   Right Occipitalis - 15 units of Botox at 3 site/s.   Left Occipitalis - 15 units of Botox at 3 site/s.    Right Frontalis - 10 units of Botox at 2 site/s.  Left Frontalis - 10 units of Botox at 2 site/s.    Right Temporalis - 20 units of Botox at 4 site/s.  Left Temporalis - 20 units of Botox at 4 site/s.    Right  - 5 units of Botox at 1 site/s.   Left  - 5 units of Botox at 1 site/s.     Procerus - 5 units of Botox at 1 site/s.      Hemostasis was achieved.    Total units used: 155  Total units wasted: 45  Botox lot numbers: [id number]  Expiration date:  05/01/24    Post-procedure pain score: 6/10    Bandaids were placed when appropriate.  The patient tolerated the procedure well.      Assessment:  Chelle Noyola is a 36 year old with past medical history including: ADHD, Anxiety, PTSD, Migraine headaches who presents for Botox injections for treatment of chronic migraine headaches.     1. Chronic Migraine headaches: Patient reports onset of migraine with Aura when they were about 13 years old. They tried numerous medications which unfortunately worsened their anxiety or were not helpful. They had been getting botox treatments at various neurology clinics including [facility] and most recently [facility] of neurology with significant improvement in the severity, duration and number of migraine headaches/month. Currently they report daily severe migraine headaches which limits their activity tolerance. We re-started their botox injections today as detailed above and they will follow-up in 3 months for repeat injections.        Plan:  The following recommendations  were given to the patient. Diagnosis, treatment options, risks, benefits, and alternatives were discussed, and all questions were answered. The patient expressed understanding of the plan for management.      I am recommending a multidisciplinary treatment plan to help this patient better manage her pain.  This includes:      1. Physical Therapy: Completed previously, continue current HEP.  2. Clinical Health Pain Psychologist: Coping well, defer.  3. Self Care Recommendations: Gentle progressive exercise that does not increase pain - gradually increase daily walking program.  Take mini breaks - 5 minutes of mindfullness a couple times a day.   4. Diagnostic Studies: None  5. Medication Management: Prior authorization for botox completed.  6. Will consider increasing botox to 175 units at next visit if 155 units were ineffective. Patient reports temporal areas as triggers and they may benefit from extra botox to these muscles.  7. Follow up for botox injections every 3 months.        Jett Rosado DO  [facility]

## 2021-10-04 NOTE — PATIENT INSTRUCTIONS
[facility]   Botox Injection Discharge Instructions    Do not rub or put extended pressure on the injection sites. You may gently touch the sites to remove any excess blood.    Monitor the injection sites for signs and symptoms of infection such as redness, swelling, warmth, fever, chills, or drainage to areas.    You may have soreness at the injection sites for up to 24 hours.    If you are able to use anti-inflammatory medications or Tylenol for pain control, you can take these as directed.    It may take up to 1 month to notice benefit from the 1st treatment    If you do notice relief, botox can be done every 3 months.  It may require insurance authorization everytime.      For questions about insurance coverage, please call the main clinic number and ask to speak with someone about botox coverage.     Pain Clinic phone number during work hours Monday-Friday:  [phone]    After hours provider line: [phone]

## 2022-01-03 ENCOUNTER — OFFICE VISIT (OUTPATIENT)
Dept: PALLIATIVE MEDICINE | Facility: CLINIC | Age: 37
End: 2022-01-03
Payer: COMMERCIAL

## 2022-01-03 VITALS — SYSTOLIC BLOOD PRESSURE: 118 MMHG | HEART RATE: 83 BPM | OXYGEN SATURATION: 100 % | DIASTOLIC BLOOD PRESSURE: 82 MMHG

## 2022-01-03 DIAGNOSIS — G43.719 INTRACTABLE CHRONIC MIGRAINE WITHOUT AURA AND WITHOUT STATUS MIGRAINOSUS: Primary | ICD-10-CM

## 2022-01-03 PROCEDURE — 64615 CHEMODENERV MUSC MIGRAINE: CPT | Performed by: PHYSICAL MEDICINE & REHABILITATION

## 2022-01-03 RX ORDER — BUPROPION HYDROCHLORIDE 150 MG/1
150 TABLET ORAL DAILY
COMMUNITY
Start: 2021-11-18 | End: 2022-06-01

## 2022-01-03 ASSESSMENT — PAIN SCALES - GENERAL: PAINLEVEL: MODERATE PAIN (4)

## 2022-01-03 NOTE — PROGRESS NOTES
[facility] - Procedure Note      Date of Service: 01/03/2022  Pre procedure Diagnosis: Chronic Migraine     Post procedure Diagnosis: Same  Procedure performed: Botox Injections for chronic headaches  : Jett Rosado DO  Anesthesia: none    Indications:    Chelle Noyola is a 36 year old female who presents to clinic today for the botox injections.   They have a history of chronic migraines with >25 headache days/month. They have tried conservative treatment including multiple headache medications and exercises without significant prolonged improvement.They report that with botox injections, the headaches are more manageable. The intensity is reduced about 50% but the duration and number of headaches still hasn't changed.     Options/alternatives, benefits and risks were discussed with the patient including bleeding, infection, pneumothorax, weakness, and headache flare.   Questions were answered and she agrees to proceed. Voluntary informed consent was obtained and signed.     Response to previous Botox treatment:   Last Botox Date: On 10/4/2021 they had their first treatment of botox with myself. They were previously having this done by their neurology teams.The intensity of headaches reduced 50% after the injections on 10/4/21. Their headaches seem to be worse around the temples and forehead.       1. Headache frequency: 25+ headache days per month.      2. Headache duration during this injection cycle: 10+hours daily     3. Headache intensity during this injection cycle: Intensity reduced about 50% overall.   4/10 = Typical pain level   8/10 = Worst pain level   2/10 = Lowest pain level     4. Change in headache medication usage: None     5. ER Visits During This Injection Cycle: NONE     6. Functional Performance: They are not as active with their friends and family on headache days. Functional improvement to be determined at follow-up.    Vitals were reviewed:  Yes  Allergies were reviewed:  Yes    Medications were reviewed:  Yes   Pre-procedure pain score: 4/10    Procedure:  After getting informed consent, a Pause for the Cause was performed.  Patient was prepped and draped with chloroprep.    A 27 gauge needle was used to make the injections.  After negative aspiration, botox was injected bilaterally into the following locations:    1 & 2. SHOULDER GIRDLE & NECK MUSCLES: 40 units Botox = Total Dose, 2:1 Dilution     Right trapezius - 20 units of Botox at 4 site/s.   Left trapezius  - 20 units of Botox at 4 site/s.         2. HEAD & SCALP MUSCLES: 125 units Botox = Total Dose, 2:1 Dilution   Right Occipitalis - 15 units of Botox at 3 site/s.   Left Occipitalis - 15 units of Botox at 3 site/s.    Right Frontalis - 15 units of Botox at 2 site/s.  Left Frontalis - 15 units of Botox at 2 site/s.    Right Temporalis - 25 units of Botox at 4 site/s.  Left Temporalis - 25 units of Botox at 4 site/s.    Right  - 5 units of Botox at 1 site/s.   Left  - 5 units of Botox at 1 site/s.     Procerus - 5 units of Botox at 1 site/s.      Hemostasis was achieved.    Total units used: 175  Total units wasted: 25  Botox lot numbers: [id number]  Expiration date:  09/2024    Post-procedure pain score: 4/10    Bandaids were placed when appropriate.  The patient tolerated the procedure well.      Assessment:  Chelle Noyola is a 36 year old with past medical history including: ADHD, Anxiety, PTSD, Migraine headaches who presents for Botox injections for treatment of chronic migraine headaches.     1. Chronic Migraine headaches: Patient reports onset of migraine with Aura when they were about 13 years old. They tried numerous medications which unfortunately worsened their anxiety or were not helpful. They had been getting botox treatments at various neurology clinics including [facility] and most recently [facility] of neurology with significant improvement  in the severity, duration and number of migraine headaches/month. Currently they report daily severe migraine headaches which limits their activity tolerance. They started botox injections with me on 10/4/21 and had 50% relief in intensity, no change in duration or number of headaches.        Plan:  The following recommendations were given to the patient. Diagnosis, treatment options, risks, benefits, and alternatives were discussed, and all questions were answered. The patient expressed understanding of the plan for management.      I am recommending a multidisciplinary treatment plan to help this patient better manage her pain.  This includes:      1. Physical Therapy: Completed previously, continue current HEP.  2. Clinical Health Pain Psychologist: Coping well, defer.  3. Self Care Recommendations: Gentle progressive exercise that does not increase pain - gradually increase daily walking program.  Take mini breaks - 5 minutes of mindfullness a couple times a day.   4. Diagnostic Studies: None  5. Medication Management: Continue botox injections q3 months.  6. Follow up for botox injections every 3 months.        Jett Rosado DO  [facility]

## 2022-01-03 NOTE — PROGRESS NOTES
[facility] - Procedure Note      Date of Service: 01/03/2022  Pre procedure Diagnosis: Chronic Migraine     Post procedure Diagnosis: Same  Procedure performed: Botox Injections for chronic headaches  : Jett Rosado DO  Anesthesia: none    Indications:    Chelle Noyola is a 36 year old female who presents to clinic today for the botox injections.   They have a history of chronic migraines with >25 headache days/month. They have tried conservative treatment including multiple headache medications and exercises    Options/alternatives, benefits and risks were discussed with the patient including bleeding, infection, pneumothorax, weakness, and headache flare.   Questions were answered and she agrees to proceed. Voluntary informed consent was obtained and signed.     Response to previous Botox treatment:   Last Botox Date: On 10/4/2021 they had their first treatment of botox with myself. They were previously having this done by their neurology teams.      1. Headache frequency: 25+ headache days per month.      2. Headache duration during this injection cycle:10+hours daily     3. Headache intensity during this injection cycle:    7/10 = Typical pain level   10/10 = Worst pain level   5/10 = Lowest pain level     4. Change in headache medication usage: None     5. ER Visits During This Injection Cycle: NONE     6. Functional Performance: They are not as active with their friends and family on headache days. Functional improvement to be determined at follow-up.    Vitals were reviewed: Yes  Allergies were reviewed:  Yes    Medications were reviewed:  Yes   Pre-procedure pain score: 8/10    Procedure:  After getting informed consent, a Pause for the Cause was performed.  Patient was prepped and draped with chloroprep.    A 27 gauge needle was used to make the injections.  After negative aspiration, botox was injected bilaterally into the following locations:    1 & 2. SHOULDER  GIRDLE & NECK MUSCLES: 50 units Botox = Total Dose, 2:1 Dilution     Right trapezius - 15 units of Botox at 3 site/s.   Left trapezius  - 15 units of Botox at 3 site/s.     Right Cervical Paraspinals - 10 units of Botox at 2 site/s.   Left Cervical Paraspinals - 10 units of Botox at 2 site/s.     2. HEAD & SCALP MUSCLES: 105 units Botox = Total Dose, 2:1 Dilution   Right Occipitalis - 15 units of Botox at 3 site/s.   Left Occipitalis - 15 units of Botox at 3 site/s.    Right Frontalis - 10 units of Botox at 2 site/s.  Left Frontalis - 10 units of Botox at 2 site/s.    Right Temporalis - 20 units of Botox at 4 site/s.  Left Temporalis - 20 units of Botox at 4 site/s.    Right  - 5 units of Botox at 1 site/s.   Left  - 5 units of Botox at 1 site/s.     Procerus - 5 units of Botox at 1 site/s.      Hemostasis was achieved.    Total units used: 155  Total units wasted: 45  Botox lot numbers: [id number]  Expiration date:  05/01/24    Post-procedure pain score: 6/10    Bandaids were placed when appropriate.  The patient tolerated the procedure well.      Assessment:  Chelle Noyola is a 36 year old with past medical history including: ADHD, Anxiety, PTSD, Migraine headaches who presents for Botox injections for treatment of chronic migraine headaches.     1. Chronic Migraine headaches: Patient reports onset of migraine with Aura when they were about 13 years old. They tried numerous medications which unfortunately worsened their anxiety or were not helpful. They had been getting botox treatments at various neurology clinics including [facility] and most recently [facility] of neurology with significant improvement in the severity, duration and number of migraine headaches/month. Currently they report daily severe migraine headaches which limits their activity tolerance. We re-started their botox injections today as detailed above and they will follow-up in 3 months for repeat  injections.        Plan:  The following recommendations were given to the patient. Diagnosis, treatment options, risks, benefits, and alternatives were discussed, and all questions were answered. The patient expressed understanding of the plan for management.      I am recommending a multidisciplinary treatment plan to help this patient better manage her pain.  This includes:      1. Physical Therapy: Completed previously, continue current HEP.  2. Clinical Health Pain Psychologist: Coping well, defer.  3. Self Care Recommendations: Gentle progressive exercise that does not increase pain - gradually increase daily walking program.  Take mini breaks - 5 minutes of mindfullness a couple times a day.   4. Diagnostic Studies: None  5. Medication Management: Prior authorization for botox completed.  6. Will consider increasing botox to 175 units at next visit if 155 units were ineffective. Patient reports temporal areas as triggers and they may benefit from extra botox to these muscles.  7. Follow up for botox injections every 3 months.        Jett Rosado DO  [facility]

## 2022-01-03 NOTE — PATIENT INSTRUCTIONS
[facility]  Botox Injection Discharge Instructions      Do not rub or put extended pressure on the injection sites. You may gently touch the sites to remove any excess blood.    Monitor the injection sites for signs and symptoms of infection such as redness, swelling, warmth, fever, chills, or drainage to areas.    You may have soreness at the injection sites for up to 24 hours.    If you are able to use anti-inflammatory medications or Tylenol for pain control, you can take these as directed.    It may take up to 1 month to notice benefit from the 1st treatment    If you do notice relief, botox can be done every 3 months.  It may require insurance authorization everytime.      For questions about insurance coverage, please call the main clinic number and ask to speak with someone about botox coverage.     Pain Clinic phone number during work hours (Monday through Friday 8 am-4:30 pm) at [phone] or the Provider Line after hours at [phone]:

## 2022-01-23 ENCOUNTER — HEALTH MAINTENANCE LETTER (OUTPATIENT)
Age: 37
End: 2022-01-23

## 2022-04-01 ENCOUNTER — OFFICE VISIT (OUTPATIENT)
Dept: FAMILY MEDICINE | Facility: CLINIC | Age: 37
End: 2022-04-01
Payer: COMMERCIAL

## 2022-04-01 VITALS
TEMPERATURE: 98.1 F | BODY MASS INDEX: 23.25 KG/M2 | HEART RATE: 91 BPM | HEIGHT: 67 IN | OXYGEN SATURATION: 100 % | SYSTOLIC BLOOD PRESSURE: 100 MMHG | DIASTOLIC BLOOD PRESSURE: 80 MMHG | WEIGHT: 148.1 LBS | RESPIRATION RATE: 16 BRPM

## 2022-04-01 DIAGNOSIS — F90.8 ADHD, ADULT RESIDUAL TYPE: Primary | ICD-10-CM

## 2022-04-01 DIAGNOSIS — F41.1 GENERALIZED ANXIETY DISORDER: ICD-10-CM

## 2022-04-01 PROCEDURE — 99214 OFFICE O/P EST MOD 30 MIN: CPT | Performed by: FAMILY MEDICINE

## 2022-04-01 RX ORDER — METHYLPHENIDATE HYDROCHLORIDE 20 MG/1
20 TABLET ORAL DAILY
Qty: 30 TABLET | Refills: 0 | Status: SHIPPED | OUTPATIENT
Start: 2022-06-02 | End: 2022-07-02

## 2022-04-01 RX ORDER — METHYLPHENIDATE HYDROCHLORIDE 20 MG/1
20 TABLET ORAL DAILY
Qty: 30 TABLET | Refills: 0 | Status: SHIPPED | OUTPATIENT
Start: 2022-05-02 | End: 2022-06-01

## 2022-04-01 RX ORDER — METHYLPHENIDATE HYDROCHLORIDE 20 MG/1
20 TABLET ORAL DAILY
Qty: 30 TABLET | Refills: 0 | Status: SHIPPED | OUTPATIENT
Start: 2022-04-01 | End: 2022-05-01

## 2022-04-01 RX ORDER — METHYLPHENIDATE HYDROCHLORIDE 20 MG/1
20 TABLET ORAL 2 TIMES DAILY
Status: CANCELLED | OUTPATIENT
Start: 2022-04-01

## 2022-04-01 ASSESSMENT — PAIN SCALES - GENERAL: PAINLEVEL: NO PAIN (0)

## 2022-04-01 ASSESSMENT — PATIENT HEALTH QUESTIONNAIRE - PHQ9
10. IF YOU CHECKED OFF ANY PROBLEMS, HOW DIFFICULT HAVE THESE PROBLEMS MADE IT FOR YOU TO DO YOUR WORK, TAKE CARE OF THINGS AT HOME, OR GET ALONG WITH OTHER PEOPLE: SOMEWHAT DIFFICULT
SUM OF ALL RESPONSES TO PHQ QUESTIONS 1-9: 9
SUM OF ALL RESPONSES TO PHQ QUESTIONS 1-9: 9

## 2022-04-01 ASSESSMENT — ENCOUNTER SYMPTOMS
NERVOUS/ANXIOUS: 1
HEADACHES: 0
DECREASED CONCENTRATION: 1
PALPITATIONS: 0
CONSTITUTIONAL NEGATIVE: 1
SLEEP DISTURBANCE: 0

## 2022-04-01 NOTE — PROGRESS NOTES
Assessment and Plan    (F90.8) ADHD, adult residual type  (primary encounter diagnosis)  Comment: 3m refills provided.  May call for another 3m cycle, OV in 6m  Plan: methylphenidate (RITALIN) 20 MG tablet,         methylphenidate (RITALIN) 20 MG tablet,         methylphenidate (RITALIN) 20 MG tablet            (F41.1) Generalized anxiety disorder  Comment: no current meds, stable  Plan:       RTC in 6m    Steve Echevarria MD      Kumar Corbett is a 37 year old who presents for the following health issues     Some concerns about her Wellbutrin.  Feels that if she tries to increase the dose she gains weight.  Mood is OK overall.  Does feel a bit irritable.  Does follow with another provider for her mood.        A.D.H.D  Pertinent negatives include no headaches.   History of Present Illness       Mental Health Follow-up:                    Today's PHQ-9         PHQ-9 Total Score: 9  PHQ-9 Q9 Thoughts of better off dead/self-harm past 2 weeks :   (P) Not at all    How difficult have these problems made it for you to do your work, take care of things at home, or get along with other people: Somewhat difficult        Reason for visit:  Med check    She eats 2-3 servings of fruits and vegetables daily.She consumes 1 sweetened beverage(s) daily.She exercises with enough effort to increase her heart rate 30 to 60 minutes per day.  She exercises with enough effort to increase her heart rate 5 days per week.   She is taking medications regularly.       Medication Followup of Ritalin    Taking Medication as prescribed: yes    Side Effects:  None    Medication Helping Symptoms:  yes     Hasn't been using Ritalin much recently.  When she uses it will not necessarily need it on weekends, more so on work days.  Long standing use, no specific concerns.    Review of Systems   Constitutional: Negative.    Cardiovascular: Negative for palpitations.   Neurological: Negative for headaches.   Psychiatric/Behavioral: Positive for  "decreased concentration. Negative for sleep disturbance. The patient is nervous/anxious.             Objective    /80 (BP Location: Right arm, Patient Position: Sitting, Cuff Size: Adult Regular)   Pulse 91   Temp 98.1  F (36.7  C) (Oral)   Resp 16   Ht 1.702 m (5' 7\")   Wt 67.2 kg (148 lb 1.6 oz)   LMP  (LMP Unknown)   SpO2 100%   BMI 23.20 kg/m    Body mass index is 23.2 kg/m .  Physical Exam  Vitals reviewed.   Neck:      Thyroid: No thyromegaly.   Cardiovascular:      Rate and Rhythm: Normal rate and regular rhythm.      Heart sounds: Normal heart sounds.   Pulmonary:      Effort: Pulmonary effort is normal.      Breath sounds: Normal breath sounds.   Skin:     General: Skin is warm and dry.   Neurological:      Mental Status: She is alert and oriented to person, place, and time.   Psychiatric:         Behavior: Behavior normal.                        "

## 2022-04-02 ASSESSMENT — PATIENT HEALTH QUESTIONNAIRE - PHQ9: SUM OF ALL RESPONSES TO PHQ QUESTIONS 1-9: 9

## 2022-04-05 ENCOUNTER — OFFICE VISIT (OUTPATIENT)
Dept: PALLIATIVE MEDICINE | Facility: CLINIC | Age: 37
End: 2022-04-05
Payer: COMMERCIAL

## 2022-04-05 VITALS — HEART RATE: 86 BPM | OXYGEN SATURATION: 100 % | DIASTOLIC BLOOD PRESSURE: 92 MMHG | SYSTOLIC BLOOD PRESSURE: 142 MMHG

## 2022-04-05 DIAGNOSIS — G43.719 INTRACTABLE CHRONIC MIGRAINE WITHOUT AURA AND WITHOUT STATUS MIGRAINOSUS: Primary | ICD-10-CM

## 2022-04-05 PROCEDURE — 64615 CHEMODENERV MUSC MIGRAINE: CPT | Performed by: PHYSICAL MEDICINE & REHABILITATION

## 2022-04-05 ASSESSMENT — PAIN SCALES - GENERAL: PAINLEVEL: MODERATE PAIN (5)

## 2022-04-05 NOTE — PROGRESS NOTES
[facility] - Procedure Note      Date of Service: 4/5/22  Pre procedure Diagnosis: Chronic Migraine     Post procedure Diagnosis: Same  Procedure performed: Botox Injections for chronic headaches  : Jett Rosado DO  Anesthesia: none    Indications:    Chelle Noyola is a 37 year old female who presents to clinic today for the botox injections.   They have a history of chronic migraines with >25 headache days/month. They have tried conservative treatment including multiple headache medications and exercises without significant prolonged improvement.They report that with botox injections, the headaches are more manageable. The intensity is reduced about 70% but the duration and number of headaches has reduced significantly.     Options/alternatives, benefits and risks were discussed with the patient including bleeding, infection, pneumothorax, weakness, and headache flare.   Questions were answered and she agrees to proceed. Voluntary informed consent was obtained and signed.     Response to previous Botox treatment:   Last Botox Date: 01/03/2022 significant improvement in headaches, duration and intensity noted.      1. Headache frequency: Very few headaches the first couple months after getting botox, more frequent in the past 3 weeks.     2. Headache duration during this injection cycle: Previously 10+ hours, now lasting much less.     3. Headache intensity during this injection cycle: 2/10 on average     4. Change in headache medication usage: None     5. ER Visits During This Injection Cycle: NONE     6. Functional Performance: Work is easier, they are a . Able to spend more time socially.    Vitals were reviewed: Yes  Allergies were reviewed:  Yes    Medications were reviewed:  Yes   Pre-procedure pain score: 5/10    Procedure:  After getting informed consent, a Pause for the Cause was performed.  Patient was prepped and draped with chloroprep.    A 27 gauge  needle was used to make the injections.  After negative aspiration, botox was injected bilaterally into the following locations:    1 & 2. SHOULDER GIRDLE & NECK MUSCLES: 40 units Botox = Total Dose, 2:1 Dilution     Right trapezius - 20 units of Botox at 4 site/s.   Left trapezius  - 20 units of Botox at 4 site/s.         2. HEAD & SCALP MUSCLES: 125 units Botox = Total Dose, 2:1 Dilution   Right Occipitalis - 15 units of Botox at 3 site/s.   Left Occipitalis - 15 units of Botox at 3 site/s.    Right Frontalis - 15 units of Botox at 2 site/s.  Left Frontalis - 15 units of Botox at 2 site/s.    Right Temporalis - 25 units of Botox at 4 site/s.  Left Temporalis - 25 units of Botox at 4 site/s.    Right  - 5 units of Botox at 1 site/s.   Left  - 5 units of Botox at 1 site/s.     Procerus - 5 units of Botox at 1 site/s.      Hemostasis was achieved.    Total units used: 165  Total units wasted: 25  Botox lot numbers: [id number]  Expiration date:  10/2024    Post-procedure pain score: 5/10    Bandaids were placed when appropriate.  The patient tolerated the procedure well.      Assessment:  Chelle Noyola is a 37 year old with past medical history including: ADHD, Anxiety, PTSD, Migraine headaches who presents for Botox injections for treatment of chronic migraine headaches.     1. Chronic Migraine headaches: Patient reports onset of migraine with Aura when they were about 13 years old. They tried numerous medications which unfortunately worsened their anxiety or were not helpful. They had been getting botox treatments at various neurology clinics including [facility] and most recently [facility] of neurology with significant improvement in the severity, duration and number of migraine headaches/month. When initially evaluated they reported daily severe migraine headaches which limited their activity tolerance. They started botox injections with me on 10/4/21 and had 50% relief in  intensity, no change in duration or number of headaches. Repeat injections on 1/6/22 provided even greater relief in intensity and has reduced the number of headaches significantly as well. We repeated the same protocol today.        Plan:  The following recommendations were given to the patient. Diagnosis, treatment options, risks, benefits, and alternatives were discussed, and all questions were answered. The patient expressed understanding of the plan for management.      I am recommending a multidisciplinary treatment plan to help this patient better manage her pain.  This includes:      1. Physical Therapy: Completed previously, continue current HEP.  2. Clinical Health Pain Psychologist: Coping well, defer.  3. Self Care Recommendations: Gentle progressive exercise that does not increase pain - gradually increase daily walking program.  Take mini breaks - 5 minutes of mindfullness a couple times a day.   4. Diagnostic Studies: None  5. Medication Management: Continue botox injections q3 months.  6. Follow up for botox injections every 3 months.        Jett Rosado DO  [facility]

## 2022-04-05 NOTE — PATIENT INSTRUCTIONS
[facility]  Post Procedure Instructions    Today you had:  Botox Injections    Medications used: Botox          Go to the emergency room if you develop any shortness of breath    Monitor the injection sites for signs and symptoms of infection-fever, chills, redness, swelling, warmth, or drainage to areas.    You may have soreness at injection sites for up to 24 hours.    It may take up to 14 days for the steroid medication to start working although you may feel the effect as early as a few days after the procedure.       You may apply ice to the painful areas to help minimize the discomfort of the needle pokes.    Do not apply heat to sites for at least 12 hours.    You may use anti-inflammatory medications or Tylenol for pain control if necessary    Pain Clinic phone number during work hours (Monday through Friday 8 am-4:30 pm) at [phone] or the Provider Line after hours at [phone]:

## 2022-06-01 DIAGNOSIS — F41.1 GENERALIZED ANXIETY DISORDER: Primary | ICD-10-CM

## 2022-06-03 RX ORDER — BUPROPION HYDROCHLORIDE 150 MG/1
150 TABLET ORAL DAILY
Qty: 90 TABLET | Refills: 0 | Status: SHIPPED | OUTPATIENT
Start: 2022-06-03 | End: 2022-08-16

## 2022-06-03 NOTE — TELEPHONE ENCOUNTER
Routing refill request to provider for review/approval because:  Medication is reported/historical    Sergio CAMPBELL RN

## 2022-06-09 ENCOUNTER — TELEPHONE (OUTPATIENT)
Dept: PALLIATIVE MEDICINE | Facility: CLINIC | Age: 37
End: 2022-06-09
Payer: COMMERCIAL

## 2022-06-09 NOTE — TELEPHONE ENCOUNTER
Transfer: Botox pt- ok to schedule with Dr Moseley or Dr Dalton RON  BSN, RN Care Coordinator  [facility]  Pain [facility]

## 2022-06-24 ENCOUNTER — TELEPHONE (OUTPATIENT)
Dept: FAMILY MEDICINE | Facility: CLINIC | Age: 37
End: 2022-06-24

## 2022-06-24 NOTE — TELEPHONE ENCOUNTER
Patient calls to ask if she can try increasing Bupropion XL dose for increased anxiety recently. Routed to provider to review if appointment needed to discuss.     Brittany Love RN  [facility]

## 2022-06-27 ENCOUNTER — TELEPHONE (OUTPATIENT)
Dept: PALLIATIVE MEDICINE | Facility: CLINIC | Age: 37
End: 2022-06-27

## 2022-06-27 DIAGNOSIS — G43.719 INTRACTABLE CHRONIC MIGRAINE WITHOUT AURA AND WITHOUT STATUS MIGRAINOSUS: Primary | ICD-10-CM

## 2022-06-27 NOTE — TELEPHONE ENCOUNTER
Pt to transfer from Dr Rosado. Will need future CAM Botox orders. Last completed 04/05/22.     Brittany WESLEY, RN Care Coordinator  [facility]  Pain Management

## 2022-06-30 ENCOUNTER — VIRTUAL VISIT (OUTPATIENT)
Dept: FAMILY MEDICINE | Facility: CLINIC | Age: 37
End: 2022-06-30
Payer: COMMERCIAL

## 2022-06-30 DIAGNOSIS — F41.1 GENERALIZED ANXIETY DISORDER: Primary | ICD-10-CM

## 2022-06-30 DIAGNOSIS — F90.8 ADHD, ADULT RESIDUAL TYPE: ICD-10-CM

## 2022-06-30 DIAGNOSIS — F33.1 MODERATE EPISODE OF RECURRENT MAJOR DEPRESSIVE DISORDER (H): ICD-10-CM

## 2022-06-30 PROBLEM — F33.9 MAJOR DEPRESSION, RECURRENT (H): Status: ACTIVE | Noted: 2022-06-30

## 2022-06-30 PROBLEM — G56.00 CARPAL TUNNEL SYNDROME: Status: ACTIVE | Noted: 2021-02-02

## 2022-06-30 PROBLEM — I10 HYPERTENSION: Status: ACTIVE | Noted: 2022-06-30

## 2022-06-30 PROBLEM — M79.603 ARM PAIN: Status: ACTIVE | Noted: 2022-06-30

## 2022-06-30 PROCEDURE — 99214 OFFICE O/P EST MOD 30 MIN: CPT | Mod: 95 | Performed by: STUDENT IN AN ORGANIZED HEALTH CARE EDUCATION/TRAINING PROGRAM

## 2022-06-30 RX ORDER — PROPRANOLOL HYDROCHLORIDE 10 MG/1
10 TABLET ORAL 3 TIMES DAILY
Qty: 90 TABLET | Refills: 0 | Status: SHIPPED | OUTPATIENT
Start: 2022-06-30 | End: 2022-08-18

## 2022-06-30 ASSESSMENT — ANXIETY QUESTIONNAIRES
GAD7 TOTAL SCORE: 19
3. WORRYING TOO MUCH ABOUT DIFFERENT THINGS: NEARLY EVERY DAY
7. FEELING AFRAID AS IF SOMETHING AWFUL MIGHT HAPPEN: MORE THAN HALF THE DAYS
1. FEELING NERVOUS, ANXIOUS, OR ON EDGE: NEARLY EVERY DAY
2. NOT BEING ABLE TO STOP OR CONTROL WORRYING: NEARLY EVERY DAY
IF YOU CHECKED OFF ANY PROBLEMS ON THIS QUESTIONNAIRE, HOW DIFFICULT HAVE THESE PROBLEMS MADE IT FOR YOU TO DO YOUR WORK, TAKE CARE OF THINGS AT HOME, OR GET ALONG WITH OTHER PEOPLE: VERY DIFFICULT
GAD7 TOTAL SCORE: 19
5. BEING SO RESTLESS THAT IT IS HARD TO SIT STILL: MORE THAN HALF THE DAYS
6. BECOMING EASILY ANNOYED OR IRRITABLE: NEARLY EVERY DAY

## 2022-06-30 ASSESSMENT — PATIENT HEALTH QUESTIONNAIRE - PHQ9
5. POOR APPETITE OR OVEREATING: NEARLY EVERY DAY
SUM OF ALL RESPONSES TO PHQ QUESTIONS 1-9: 15

## 2022-06-30 NOTE — PROGRESS NOTES
Chelle is a 37 year old who is being evaluated via a billable video visit.      How would you like to obtain your AVS? Mail a copy  If the video visit is dropped, the invitation should be resent by: Text to cell phone: [phone]  Will anyone else be joining your video visit? No    Assessment & Plan     Generalized anxiety disorder  Moderate episode of recurrent major depressive disorder (H)  ADHD, adult residual type  Worsening anxiety and would like to discuss medication adjustment. Currently taking Ritalin for ADHD and bupropion for MDD, wanting to increase bupropion for anxiety. Has hx of MDD and ISABEL, tried several selective serotonin reuptake inhibitor, SNRI, buspirone and antipsychotics in past - doesn't like the way they make her feel (sedated) and gained 20 pounds in the past. Doesn't want to restart any of these medications. After long discussion regarding several medication options including transitioning from Ritalin to Vyvanse (was on in the past and worked well for her) and retrial of selective serotonin reuptake inhibitor, decided to try adding propranolol for anxiety symptoms. Patient has been to therapy in past, not currently going, recommended following up to help with anxiety symptoms.    propranolol (INDERAL) 10 MG tablet  Dispense: 90 tablet; Refill: 0  - follow up in 2 weeks for reassessment    Tobacco Cessation:   reports that she has been smoking cigarettes. She has never used smokeless tobacco.    Return in about 2 weeks (around 7/14/2022) for Follow up anxiety.    Suman Au MD  [facility]  6/30/2022    Kumar Corbett is a 37 year old, presenting for the following health issues:  Anxiety (Patient is being seen to discuss increased anxiety for a while.)    HPI     Anxiety  Would like to increase bupropion for anxiety.  Last time was in the clinic, was thinking about increasing the bupropion  Doesn't want to gain weight. Has gained a lot of weight in the  past.  Has been on an increased dose of bupropion in the past - tolerated fine.  Snaps a lot, irritable.    Has been on lots of medication in past - prozac, zoloft, effexor. Hard to discontinue. Withdrawals are horrendous to get off those medications, particularly effexor.  Gets other side effects too, weight gain, foggy, sedated mood.    Wondering about switching from Ritalin to Vyvanse  Was on Vyvanse in the past but insurance didn't cover it and was very expensive.  Had less side effects on Vyvanse.  Not sure if symptoms of increased anxiety correlated with increase in Ritalin.    Has been in therapy in the past  Has been hit or miss  Last seen a couple years ago    Review of Systems   See HPI for pertinent pos and neg        Objective           Vitals:  No vitals were obtained today due to virtual visit.    Physical Exam   GENERAL: Healthy, alert and no distress  EYES: Eyes grossly normal to inspection.  No discharge or erythema, or obvious scleral/conjunctival abnormalities.  RESP: No audible wheeze, cough, or visible cyanosis.  No visible retractions or increased work of breathing.    SKIN: Visible skin clear. No significant rash, abnormal pigmentation or lesions.  NEURO: Cranial nerves grossly intact.  Mentation and speech appropriate for age.  PSYCH: Mentation appears normal, affect normal/bright, judgement and insight intact, normal speech and appearance well-groomed.    Video-Visit Details    Video Start Time: 9:00AM    Type of service:  Video Visit    Video End Time: 9:27AM    Originating Location (pt. Location): Home    Distant Location (provider location):  [facility]     Platform used for Video Visit: [employer]

## 2022-06-30 NOTE — COMMUNITY RESOURCES LIST (ENGLISH)
06/30/2022   [facility] Outpatient Clin[facility]  Suman Au  For questions about this resource list or additional care needs, please contact your primary care clinic or care manager.  Phone: [phone]   Email: N/A   Address: [address] [place] [address]   Hours: N/A        Hotlines and Helplines       Hotline - Crisis help  1  [facility] - Child Protection - [facility] Crisis Response Unit Distance: 5.94 miles      COVID-19 Status: Phone/Virtual   [address] [place] [address]  Language: English  Hours: Mon - Sun Open 24 Hours   Phone: (421) 392-9861 Email: [email or web address]. Website: [email or web address]/[email or web address]     2  [facility] Crisis Response Unit - Suicide and Crisis Response Hotline Distance: 16.35 miles      COVID-19 Status: Phone/Virtual   [address] [facility] [address] [place] [address]  Language: English  Hours: Mon - Sun Open 24 Hours   Phone: (393) 449-8819 Email: [email or web address] Website: [email or web address].[facility]/HealthFamily/HandlingEmergencies/Help          Mental Health       Individual counseling  3  [facility] Distance: 5.31 miles      COVID-19 Status: Regular Operations, COVID-19 Status: Phone/Virtual   [address] [place] [address]  Language: English, Libyan  Hours: Mon - Thu 7:00 AM - 8:00 PM , Fri 7:00 AM - 6:00 PM  Fees: Insurance, Self Pay   Phone: (716) 439-1497 Website: [email or web address]     4  [facility] Distance: 5.57 miles      COVID-19 Status: Phone/Virtual   [address] [facility] [address] [place] [address]  Language: English  Hours: Mon - Thu 8:30 AM - 5:30 PM , Fri 8:30 AM - 4:00 PM  Fees: Insurance, Self Pay   Phone: (144) 741-7875 Email: arleen@[email or web address] Website: [email or web address] CampusTaposÃ©Â©.[employer]/locations/[facility]/     Mental health crisis care  5  [facility]  [facility] Distance: 12.3 miles      COVID-19 Status: Regular Operations, COVID-19 Status: Phone/Virtual   [address] [place] [address]  Language: English  Hours: Mon - Thu 8:30 AM - 9:00 PM , Fri 8:30 AM - 5:00 PM , Sat 8:00 AM - 4:00 PM  Fees: Insurance, Self Pay, Sliding Fee   Phone: (652) 824-2828 Email: arleen@[email or web address] Website: [email or web address]/locations/[place]     6  [employer] - 24-Hour Mental Health Practitioner Distance: 15.76 miles      COVID-19 Status: Regular Operations, COVID-19 Status: Phone/Virtual   [address] [place] [address]  Language: English, Hmong  Hours: Mon - Fri 8:00 AM - 4:30 PM  Fees: Insurance, Self Pay   Phone: (449) 263-8359 Email: info@[employer] Website: http://www.[employer]/     Mental health support group  7  [facility] Distance: 10.38 miles      COVID-19 Status: Phone/Virtual   [address] [address] [place] [address]  Language: English  Hours: Mon - Fri 9:00 AM - 5:00 PM Appt. Only  Fees: Free   Phone: (550) 731-1349 Email: [email or web address]@[email or web address] Website: http://www.[employer]/     8  [facility] (Main Office) Distance: 14.92 miles      COVID-19 Status: Phone/Virtual   [address] [place] [address]  Language: English  Hours: Mon - Fri 9:00 AM - 5:00 PM  Fees: Free   Phone: (898) 383-6337 Email: info@[employer].org Website: http://[employer].org          Important Numbers & Websites       Emergency Services   911  City Services   311  Poison Control   (598) 714-6302  Suicide Prevention Lifeline   (295) 676-1878 (TALK)  Child Abuse Hotline   (933) 725-4610 (4-A-Child)  Sexual Assault Hotline   (318) 967-3797 (HOPE)  National Runaway Safeline   (833) 782-5659 (RUNAWAY)  All-Options Talkline   (375) 755-2532  Substance Abuse Referral   (504) 955-4276 (HELP)

## 2022-07-01 DIAGNOSIS — G43.719 INTRACTABLE CHRONIC MIGRAINE WITHOUT AURA AND WITHOUT STATUS MIGRAINOSUS: Primary | ICD-10-CM

## 2022-07-14 ENCOUNTER — VIRTUAL VISIT (OUTPATIENT)
Dept: FAMILY MEDICINE | Facility: CLINIC | Age: 37
End: 2022-07-14
Payer: COMMERCIAL

## 2022-07-14 DIAGNOSIS — F41.1 GENERALIZED ANXIETY DISORDER: Primary | ICD-10-CM

## 2022-07-14 DIAGNOSIS — F90.8 ADHD, ADULT RESIDUAL TYPE: ICD-10-CM

## 2022-07-14 DIAGNOSIS — F32.A DEPRESSION, UNSPECIFIED DEPRESSION TYPE: ICD-10-CM

## 2022-07-14 DIAGNOSIS — Z12.4 CERVICAL CANCER SCREENING: ICD-10-CM

## 2022-07-14 PROCEDURE — 99214 OFFICE O/P EST MOD 30 MIN: CPT | Mod: 95 | Performed by: STUDENT IN AN ORGANIZED HEALTH CARE EDUCATION/TRAINING PROGRAM

## 2022-07-14 RX ORDER — LISDEXAMFETAMINE DIMESYLATE 30 MG/1
30 CAPSULE ORAL EVERY MORNING
Qty: 30 CAPSULE | Refills: 0 | Status: SHIPPED | OUTPATIENT
Start: 2022-07-14 | End: 2022-08-18

## 2022-07-14 ASSESSMENT — ANXIETY QUESTIONNAIRES
5. BEING SO RESTLESS THAT IT IS HARD TO SIT STILL: SEVERAL DAYS
7. FEELING AFRAID AS IF SOMETHING AWFUL MIGHT HAPPEN: SEVERAL DAYS
6. BECOMING EASILY ANNOYED OR IRRITABLE: SEVERAL DAYS
GAD7 TOTAL SCORE: 7
1. FEELING NERVOUS, ANXIOUS, OR ON EDGE: SEVERAL DAYS
2. NOT BEING ABLE TO STOP OR CONTROL WORRYING: SEVERAL DAYS
GAD7 TOTAL SCORE: 7
IF YOU CHECKED OFF ANY PROBLEMS ON THIS QUESTIONNAIRE, HOW DIFFICULT HAVE THESE PROBLEMS MADE IT FOR YOU TO DO YOUR WORK, TAKE CARE OF THINGS AT HOME, OR GET ALONG WITH OTHER PEOPLE: SOMEWHAT DIFFICULT
3. WORRYING TOO MUCH ABOUT DIFFERENT THINGS: SEVERAL DAYS

## 2022-07-14 ASSESSMENT — PATIENT HEALTH QUESTIONNAIRE - PHQ9: 5. POOR APPETITE OR OVEREATING: SEVERAL DAYS

## 2022-07-14 NOTE — PROGRESS NOTES
Chelle is a 37 year old who is being evaluated via a billable video visit.      How would you like to obtain your AVS? MyChart  If the video visit is dropped, the invitation should be resent by: Text to cell phone: [phone]  Will anyone else be joining your video visit? No    Assessment & Plan     Generalized anxiety disorder  Depression, unspecified depression type  ADHD, adult residual type  ISABEL of 17 last week decreased to ISABEL of 7 with addition of propranolol. Is also on Ritalin for ADHD and bupropion for depression. Was on Vyvanse in the past for ADHD and felt she had less side effects and anxiety on this medication, only transitioned to Ritalin due to insurance coverage. Will trial switch back to Vyvanse. Can consider discontinuation of propranolol at next visit if anxiety is much better controlled. Did discussed effect of bupropion on anxiety as well, elects not to change as she has tried several antidepressant/anxiety medications in the past.  - lisdexamfetamine (VYVANSE) 30 MG capsule  Dispense: 30 capsule; Refill: 0  - follow up in one month for BP/HR check and pap smear, as below    Cervical cancer screening  Due for pap smear, will schedule follow up in office visit in one month    Tobacco Cessation:   reports that she has been smoking cigarettes. She has never used smokeless tobacco.    Return in about 4 weeks (around 8/11/2022) for Follow up anxiety.    Suman Au MD  [facility]    Subjective   Chelle is a 37 year old presenting for the following health issues:    Anxiety    HPI     Anxiety Follow-Up    How are you doing with your anxiety since your last visit? Improved     Are you having other symptoms that might be associated with anxiety? No    Have you had a significant life event? No     Are you feeling depressed? No    Do you have any concerns with your use of alcohol or other drugs? No    Social History     Tobacco Use     Smoking status: Current Some Day Smoker      Types: Cigarettes     Smokeless tobacco: Never Used     Tobacco comment: every now and then   Vaping Use     Vaping Use: Never used   Substance Use Topics     Alcohol use: Yes     Comment: 2 Drinks per Week     Drug use: No     Comment: None     ISABEL-7 SCORE 4/26/2021 6/30/2022 7/14/2022   Total Score - - -   Total Score 5 19 7     PHQ 4/26/2021 4/1/2022 6/30/2022   PHQ-9 Total Score 12 9 15   Q9: Thoughts of better off dead/self-harm past 2 weeks Not at all Not at all Not at all       How many servings of fruits and vegetables do you eat daily?  2-3    On average, how many sweetened beverages do you drink each day (Examples: soda, juice, sweet tea, etc.  Do NOT count diet or artificially sweetened beverages)?   0    How many days per week do you exercise enough to make your heart beat faster? 4    How many minutes a day do you exercise enough to make your heart beat faster? 30 - 60    How many days per week do you miss taking your medication?     Anxiety  Feels like it has been going quite a bit better  For the most part, has been taking TID. Some hard time remember taking it.  Noticed side effect at first - felt stomach upset, maybe had a stomach bug.  Now it seems fine.   No dizziness, lightheadedness at all.    Feels like when she was on Vyvanse in the past, she tolerated this much better  It was through another clinic, a while ago. She thinks she was on 20 or 30 mg daily.  Had lower anxiety while on this medication.  Would like to trial a switch.         Objective         Vitals:  No vitals were obtained today due to virtual visit.    Physical Exam   GENERAL: Healthy, alert and no distress  EYES: Eyes grossly normal to inspection.  No discharge or erythema, or obvious scleral/conjunctival abnormalities.  RESP: No audible wheeze, cough, or visible cyanosis.  No visible retractions or increased work of breathing.    SKIN: Visible skin clear. No significant rash, abnormal pigmentation or lesions.  NEURO: Cranial  nerves grossly intact.  Mentation and speech appropriate for age.  PSYCH: Mentation appears normal, affect normal/bright, judgement and insight intact, normal speech and appearance well-groomed.    Video-Visit Details    Video Start Time: 9:00 AM    Type of service:  Video Visit    Video End Time: 9:22 AM    Originating Location (pt. Location): Home    Distant Location (provider location):  [facility] [email or web address]     Platform used for Video Visit: [employer]

## 2022-08-16 ENCOUNTER — TELEPHONE (OUTPATIENT)
Dept: FAMILY MEDICINE | Facility: CLINIC | Age: 37
End: 2022-08-16

## 2022-08-16 DIAGNOSIS — F90.8 ADHD, ADULT RESIDUAL TYPE: ICD-10-CM

## 2022-08-16 DIAGNOSIS — F41.1 GENERALIZED ANXIETY DISORDER: ICD-10-CM

## 2022-08-16 RX ORDER — BUPROPION HYDROCHLORIDE 150 MG/1
150 TABLET ORAL DAILY
Qty: 90 TABLET | Refills: 1 | Status: SHIPPED | OUTPATIENT
Start: 2022-08-16 | End: 2023-02-27

## 2022-08-16 RX ORDER — LISDEXAMFETAMINE DIMESYLATE 30 MG/1
30 CAPSULE ORAL EVERY MORNING
Qty: 30 CAPSULE | Refills: 0 | OUTPATIENT
Start: 2022-08-16

## 2022-08-16 NOTE — TELEPHONE ENCOUNTER
Reason for call:  Medication   If this is a refill request, has the caller requested the refill from the pharmacy already? No  Will the patient be using a [facility]? No  Name of the pharmacy and phone number for the current request: [facility] [address] [facility]  [phone]    Name of the medication requested: lisdexamfetamine (VYVANSE) 30 MG, buPROPion (WELLBUTRIN XL) 150 MG     Other request: n/a    Phone number to reach patient:  Home number on file [phone] (home)    Best Time:  Any time    Can we leave a detailed message on this number?  YES    Travel screening: Not Applicable

## 2022-08-16 NOTE — TELEPHONE ENCOUNTER
Brief chart review.     Will refill bupropion.    Last seen on 7/14/22 virtually for ADHD, started Vyvanse from Ritalin at that time. Recommended follow up in one month. No showed last visit. Needs appointment. Please let patient know.     Suman Au MD  [facility]

## 2022-08-16 NOTE — TELEPHONE ENCOUNTER
Patient states that she wrote down the wrong the date for her appointment and came the next day. Still requesting refill to get to next appointment 9/14 of Vyvanse and propranolol.     Dustin Henderson, CMA

## 2022-08-18 RX ORDER — PROPRANOLOL HYDROCHLORIDE 10 MG/1
10 TABLET ORAL 3 TIMES DAILY
Qty: 270 TABLET | Refills: 3 | Status: SHIPPED | OUTPATIENT
Start: 2022-08-18 | End: 2022-09-14

## 2022-08-18 RX ORDER — LISDEXAMFETAMINE DIMESYLATE 30 MG/1
30 CAPSULE ORAL EVERY MORNING
Qty: 30 CAPSULE | Refills: 0 | Status: SHIPPED | OUTPATIENT
Start: 2022-08-18 | End: 2022-09-14

## 2022-08-18 RX ORDER — PROPRANOLOL HYDROCHLORIDE 10 MG/1
TABLET ORAL
Qty: 90 TABLET | Refills: 0 | Status: SHIPPED | OUTPATIENT
Start: 2022-08-18 | End: 2022-09-14

## 2022-08-18 NOTE — TELEPHONE ENCOUNTER
Routing refill request to provider for review/approval because:  BP  BP Readings from Last 3 Encounters:   04/05/22 (!) 142/92   04/01/22 100/80   01/03/22 118/82     Pt has upcoming appointment with Dr. Will on 9/14/22    Josefina Colindres, RN, BSN, PHN  [facility]

## 2022-08-18 NOTE — TELEPHONE ENCOUNTER
As patient has been on stimulants in the past without concern and would have refills available if she didn't recently switch stimulants, will provide 1x crystal refill to bridge her until next appointment.    PDMP reviewed and appropriate.    Also refilling propranolol today.    Suman Au MD  [facility]

## 2022-09-05 NOTE — PROGRESS NOTES
"Procedure note for Botox:  Pre procedure Diagnosis: Chronic Migraine     Post procedure Diagnosis: Same  Procedure performed: Botox Injections  Anesthesia: none  Complications: none  Operators: Vivian Hoffman MD and ANGELA Vazquez (Pain Fellow)    Indications:    Chelle Noyola is a 37 year old female who presents to clinic today for botox injections.  They have a history of chronic migraine headaches, more than 14 days/month.  Exam shows tenderness over the occipital and temporal muscles and they have tried conservative treatment including multiple headache medications and exercises.They report that with botox injections, the headaches are more manageable. The intensity is reduced about 70% but the duration and number of headaches has reduced significantly.    Options/alternatives, benefits and risks were discussed with the patient including bleeding, infection, pneumothorax, weakness, and headache flare.   Questions were answered and she agrees to proceed. Voluntary informed consent was obtained and signed.     Response to previous Botox treatment:   Last Botox Date: 4/5/2022 with Dr. Rosado.  Total Unit: 155U    1. Headache frequency: 20 headache days per month. This is compared to his baseline headache frequency of same headache days per month. However the headaches are dulled with the botox and this makes them \"a lot better\"     2. Headache duration during this injection cycle: Headache duration has decreased.       3. Headache intensity during this injection cycle:    1-4/10 = Typical pain level   4/10 = Worst pain level   1/10 = Lowest pain level     4. Change in headache medication usage: ibuprofen, tylenol, baclofen - does not need these at all with the Botox injections     5. ER Visits During This Injection Cycle: NONE     6. Functional Performance: Change in ADL's, social interaction, days lost from work, etc. Patient reports being able to more fully participate in social and family activities and " responsibilities as headache symptoms have improved.    Vitals were reviewed: Yes  Allergies were reviewed:  Yes    Medications were reviewed:  Yes   Pre-procedure pain score: 7/10    Procedure:  After getting informed consent, a Pause for the Cause was performed.  Patient was prepped and draped with chloroprep.    A 27 gauge needle was used to make the injections.  After negative aspiration, botox was injected bilaterally into the following locations:    Procerus- 5 units (1 site)  Frontals- 20 units (4 sites)   -10 units (2 sites)  Temporalis- 40 units (8 sites)  Occipitis- 30 units (6 sites)  Cervical paraspinals- 20 units (4 sites).  Upper Trapezius- 30 units (6 sites)           Hemostasis was achieved.    Total units used: 155  Total units wasted: 45  Botox lot numbers and Expiration dates:  SEE MAR      Bandaids were placed when appropriate.  The patient tolerated the procedure well.    Follow-up includes:    -can be repeated after 3 full months have passed. Scheduled for December 8th, 2022 @ 1030AM    This patient was seen and discussed with the attending physician    Tiana Palma MD  Chronic Pain Fellow   [facility]       DARIO AWAD MD   Pain Management & Addiction Medicine

## 2022-09-06 ENCOUNTER — OFFICE VISIT (OUTPATIENT)
Dept: PALLIATIVE MEDICINE | Facility: CLINIC | Age: 37
End: 2022-09-06
Payer: COMMERCIAL

## 2022-09-06 VITALS — HEART RATE: 82 BPM | SYSTOLIC BLOOD PRESSURE: 144 MMHG | OXYGEN SATURATION: 100 % | DIASTOLIC BLOOD PRESSURE: 104 MMHG

## 2022-09-06 DIAGNOSIS — G43.009 MIGRAINE WITHOUT AURA AND WITHOUT STATUS MIGRAINOSUS, NOT INTRACTABLE: Primary | ICD-10-CM

## 2022-09-06 PROCEDURE — 64615 CHEMODENERV MUSC MIGRAINE: CPT | Performed by: PHYSICAL MEDICINE & REHABILITATION

## 2022-09-06 ASSESSMENT — PAIN SCALES - PAIN ENJOYMENT GENERAL ACTIVITY SCALE (PEG)
INTERFERED_GENERAL_ACTIVITY: 7
AVG_PAIN_PASTWEEK: 7
PEG_TOTALSCORE: 7
INTERFERED_ENJOYMENT_LIFE: 7

## 2022-09-06 ASSESSMENT — PAIN SCALES - GENERAL: PAINLEVEL: SEVERE PAIN (7)

## 2022-09-06 NOTE — NURSING NOTE
Patient presents to the clinic today for a follow up with Vivian Hoffman MD regarding Pain Management.          UDS/CSA-None found      Medications-        QUESTIONS:              Amanda Mathur  [facility] Visit Facilitator

## 2022-09-09 NOTE — PATIENT INSTRUCTIONS
1. Cervical radiculopathy    2. Carpal tunnel syndrome of left wrist    3. Carpal tunnel syndrome of right wrist      -Patient has 4 years of chronic neck pain, headaches and bilateral numbness tingling and weakness  -Patient likely has pinched nerves from her neck and possible carpal tunnel syndrome in both wrists as well.  -Patient will get an MRI of the cervical spine for further evaluation.Your MRI has been ordered. Will check for same day otherwise, schedule with [place] ([phone]). Once you know the date of your MRI, please call my office and schedule a follow-up visit for 2 days after that.  We will review results and consider epidural steroid injections if appropriate.  -Patient will start a steroid taper, once complete will continue with naproxen twice a day as needed.  Patient will start baclofen 20 mg at night for muscle spasms immediately.  -We will consider bilateral upper extremity nerve tests in the future to definitively diagnose carpal tunnel syndrome and will consider the ultrasound-guided minimally invasive procedure if we can find a provider that performs that.  -To consider carpal tunnel cortisone injection at subsequent visits as well.  -Call direct clinic number [[phone]] at any time with questions or concerns.    Albert Yeo MD [facility]  [facility]         no

## 2022-09-10 ENCOUNTER — HEALTH MAINTENANCE LETTER (OUTPATIENT)
Age: 37
End: 2022-09-10

## 2022-09-14 ENCOUNTER — OFFICE VISIT (OUTPATIENT)
Dept: FAMILY MEDICINE | Facility: CLINIC | Age: 37
End: 2022-09-14
Payer: COMMERCIAL

## 2022-09-14 VITALS
RESPIRATION RATE: 16 BRPM | HEIGHT: 67 IN | SYSTOLIC BLOOD PRESSURE: 124 MMHG | WEIGHT: 145.3 LBS | HEART RATE: 72 BPM | DIASTOLIC BLOOD PRESSURE: 82 MMHG | BODY MASS INDEX: 22.81 KG/M2

## 2022-09-14 DIAGNOSIS — F17.200 TOBACCO USE DISORDER: ICD-10-CM

## 2022-09-14 DIAGNOSIS — F90.8 ADHD, ADULT RESIDUAL TYPE: ICD-10-CM

## 2022-09-14 DIAGNOSIS — Z12.4 CERVICAL CANCER SCREENING: ICD-10-CM

## 2022-09-14 DIAGNOSIS — F41.1 GENERALIZED ANXIETY DISORDER: ICD-10-CM

## 2022-09-14 DIAGNOSIS — Z00.00 ROUTINE GENERAL MEDICAL EXAMINATION AT A HEALTH CARE FACILITY: Primary | ICD-10-CM

## 2022-09-14 DIAGNOSIS — M54.12 CERVICAL RADICULOPATHY: ICD-10-CM

## 2022-09-14 DIAGNOSIS — R61 NIGHT SWEATS: ICD-10-CM

## 2022-09-14 PROBLEM — M54.2 NECK PAIN: Status: ACTIVE | Noted: 2022-06-30

## 2022-09-14 PROCEDURE — G0145 SCR C/V CYTO,THINLAYER,RESCR: HCPCS | Performed by: STUDENT IN AN ORGANIZED HEALTH CARE EDUCATION/TRAINING PROGRAM

## 2022-09-14 PROCEDURE — 83001 ASSAY OF GONADOTROPIN (FSH): CPT | Performed by: STUDENT IN AN ORGANIZED HEALTH CARE EDUCATION/TRAINING PROGRAM

## 2022-09-14 PROCEDURE — 99214 OFFICE O/P EST MOD 30 MIN: CPT | Mod: 25 | Performed by: STUDENT IN AN ORGANIZED HEALTH CARE EDUCATION/TRAINING PROGRAM

## 2022-09-14 PROCEDURE — 99395 PREV VISIT EST AGE 18-39: CPT | Performed by: STUDENT IN AN ORGANIZED HEALTH CARE EDUCATION/TRAINING PROGRAM

## 2022-09-14 PROCEDURE — 87624 HPV HI-RISK TYP POOLED RSLT: CPT | Performed by: STUDENT IN AN ORGANIZED HEALTH CARE EDUCATION/TRAINING PROGRAM

## 2022-09-14 PROCEDURE — 36415 COLL VENOUS BLD VENIPUNCTURE: CPT | Performed by: STUDENT IN AN ORGANIZED HEALTH CARE EDUCATION/TRAINING PROGRAM

## 2022-09-14 RX ORDER — BACLOFEN 20 MG/1
TABLET ORAL
Qty: 30 TABLET | Refills: 0 | Status: SHIPPED | OUTPATIENT
Start: 2022-09-14

## 2022-09-14 RX ORDER — LISDEXAMFETAMINE DIMESYLATE 30 MG/1
30 CAPSULE ORAL EVERY MORNING
Qty: 30 CAPSULE | Refills: 0 | Status: SHIPPED | OUTPATIENT
Start: 2022-11-17 | End: 2022-12-12

## 2022-09-14 RX ORDER — LISDEXAMFETAMINE DIMESYLATE 30 MG/1
30 CAPSULE ORAL EVERY MORNING
Qty: 30 CAPSULE | Refills: 0 | Status: SHIPPED | OUTPATIENT
Start: 2022-09-17 | End: 2022-10-17

## 2022-09-14 RX ORDER — LISDEXAMFETAMINE DIMESYLATE 30 MG/1
30 CAPSULE ORAL EVERY MORNING
Qty: 30 CAPSULE | Refills: 0 | Status: SHIPPED | OUTPATIENT
Start: 2022-10-17 | End: 2022-11-16

## 2022-09-14 RX ORDER — PROPRANOLOL HYDROCHLORIDE 10 MG/1
10 TABLET ORAL 3 TIMES DAILY
Qty: 270 TABLET | Refills: 0 | Status: SHIPPED | OUTPATIENT
Start: 2022-09-14 | End: 2023-09-18

## 2022-09-14 ASSESSMENT — PATIENT HEALTH QUESTIONNAIRE - PHQ9
SUM OF ALL RESPONSES TO PHQ QUESTIONS 1-9: 8
10. IF YOU CHECKED OFF ANY PROBLEMS, HOW DIFFICULT HAVE THESE PROBLEMS MADE IT FOR YOU TO DO YOUR WORK, TAKE CARE OF THINGS AT HOME, OR GET ALONG WITH OTHER PEOPLE: SOMEWHAT DIFFICULT
SUM OF ALL RESPONSES TO PHQ QUESTIONS 1-9: 8

## 2022-09-14 ASSESSMENT — ANXIETY QUESTIONNAIRES
GAD7 TOTAL SCORE: 7
8. IF YOU CHECKED OFF ANY PROBLEMS, HOW DIFFICULT HAVE THESE MADE IT FOR YOU TO DO YOUR WORK, TAKE CARE OF THINGS AT HOME, OR GET ALONG WITH OTHER PEOPLE?: SOMEWHAT DIFFICULT
7. FEELING AFRAID AS IF SOMETHING AWFUL MIGHT HAPPEN: SEVERAL DAYS
3. WORRYING TOO MUCH ABOUT DIFFERENT THINGS: SEVERAL DAYS
7. FEELING AFRAID AS IF SOMETHING AWFUL MIGHT HAPPEN: SEVERAL DAYS
IF YOU CHECKED OFF ANY PROBLEMS ON THIS QUESTIONNAIRE, HOW DIFFICULT HAVE THESE PROBLEMS MADE IT FOR YOU TO DO YOUR WORK, TAKE CARE OF THINGS AT HOME, OR GET ALONG WITH OTHER PEOPLE: SOMEWHAT DIFFICULT
4. TROUBLE RELAXING: SEVERAL DAYS
GAD7 TOTAL SCORE: 7
1. FEELING NERVOUS, ANXIOUS, OR ON EDGE: SEVERAL DAYS
2. NOT BEING ABLE TO STOP OR CONTROL WORRYING: SEVERAL DAYS
5. BEING SO RESTLESS THAT IT IS HARD TO SIT STILL: SEVERAL DAYS
GAD7 TOTAL SCORE: 7
6. BECOMING EASILY ANNOYED OR IRRITABLE: SEVERAL DAYS

## 2022-09-14 ASSESSMENT — ENCOUNTER SYMPTOMS: NERVOUS/ANXIOUS: 1

## 2022-09-15 LAB — FSH SERPL IRP2-ACNC: 6.7 MIU/ML

## 2022-09-16 LAB
BKR LAB AP GYN ADEQUACY: NORMAL
BKR LAB AP GYN INTERPRETATION: NORMAL
BKR LAB AP HPV REFLEX: NORMAL
BKR LAB AP LMP: NORMAL
BKR LAB AP PREVIOUS ABNORMAL: NORMAL
PATH REPORT.COMMENTS IMP SPEC: NORMAL
PATH REPORT.COMMENTS IMP SPEC: NORMAL
PATH REPORT.RELEVANT HX SPEC: NORMAL

## 2022-09-19 LAB
HUMAN PAPILLOMA VIRUS 16 DNA: NEGATIVE
HUMAN PAPILLOMA VIRUS 18 DNA: NEGATIVE
HUMAN PAPILLOMA VIRUS FINAL DIAGNOSIS: NORMAL
HUMAN PAPILLOMA VIRUS OTHER HR: NEGATIVE

## 2022-12-08 ENCOUNTER — OFFICE VISIT (OUTPATIENT)
Dept: PALLIATIVE MEDICINE | Facility: CLINIC | Age: 37
End: 2022-12-08
Payer: COMMERCIAL

## 2022-12-08 VITALS — SYSTOLIC BLOOD PRESSURE: 147 MMHG | DIASTOLIC BLOOD PRESSURE: 91 MMHG | HEART RATE: 108 BPM | OXYGEN SATURATION: 100 %

## 2022-12-08 DIAGNOSIS — G43.009 MIGRAINE WITHOUT AURA AND WITHOUT STATUS MIGRAINOSUS, NOT INTRACTABLE: Primary | ICD-10-CM

## 2022-12-08 PROCEDURE — 64615 CHEMODENERV MUSC MIGRAINE: CPT | Performed by: ANESTHESIOLOGY

## 2022-12-08 ASSESSMENT — PAIN SCALES - GENERAL: PAINLEVEL: MODERATE PAIN (5)

## 2022-12-08 NOTE — PROGRESS NOTES
"\[id number] note for Botox:  Pre procedure Diagnosis: Chronic Migraine     Post procedure Diagnosis: Same  Procedure performed: Botox Injections  Anesthesia: none  Complications: none  Operators: Vivian Hoffman MD     Indications:    Chelle Noyola is a 37 year old female who presents to clinic today for botox injections.  They have a history of chronic migraine headaches, more than 14 days/month.  Exam shows tenderness over the occipital and temporal muscles and they have tried conservative treatment including multiple headache medications and exercises.They report that with botox injections, the headaches are more manageable. The intensity is reduced about 70% but the duration and number of headaches has reduced significantly.    Options/alternatives, benefits and risks were discussed with the patient including bleeding, infection, pneumothorax, weakness, and headache flare.   Questions were answered and she agrees to proceed. Voluntary informed consent was obtained and signed.     Response to previous Botox treatment:   Last Botox Date: 9/6/2022 RITESH & 4/5/2022 with Dr. Rosado.  Total Unit: 155U    1. Headache frequency: 20 headache days per month. This is compared to his baseline headache frequency of same headache days per month. However the headaches are dulled with the botox and this makes them \"a lot better\"     2. Headache duration during this injection cycle: Headache duration has decreased.       3. Headache intensity during this injection cycle:    1-4/10 = Typical pain level   4/10 = Worst pain level   1/10 = Lowest pain level     4. Change in headache medication usage: ibuprofen, tylenol, baclofen - does not need these at all with the Botox injections     5. ER Visits During This Injection Cycle: NONE     6. Functional Performance: Change in ADL's, social interaction, days lost from work, etc. Patient reports being able to more fully participate in social " and family activities and responsibilities as headache symptoms have improved.    Vitals were reviewed: Yes  Allergies were reviewed:  Yes    Medications were reviewed:  Yes       Procedure:  After getting informed consent, a Pause for the Cause was performed.  Patient was prepped and draped with chloroprep.    A 27 gauge needle was used to make the injections.  After negative aspiration, botox was injected bilaterally into the following locations:    Procerus- 5 units (1 site)  Frontals- 20 units (4 sites)   -10 units (2 sites)  Temporalis- 40 units (8 sites)  Occipitis- 30 units (6 sites)  Cervical paraspinals- 20 units (4 sites).  Upper Trapezius- 30 units (6 sites)           Hemostasis was achieved.    Total units used: 155  Total units wasted: 45  Botox lot numbers and Expiration dates:  SEE MAR      Bandaids were placed when appropriate.  The patient tolerated the procedure well.    Follow-up includes:    -can be repeated after 3 full months have passed. Scheduled for MARCH 9th, 2022 @ 10AM      DARIO AWAD MD   Pain Management & Addiction Medicine

## 2022-12-08 NOTE — PATIENT INSTRUCTIONS
[facility]  Botox Injection Discharge Instructions    Do not rub or put extended pressure on the injection sites. You may gently touch the sites to remove any excess blood.  Monitor the injection sites for signs and symptoms of infection such as redness, swelling, warmth, fever, chills, or drainage to areas.  You may have soreness at the injection sites for up to 24 hours.  If you are able to use anti-inflammatory medications or Tylenol for pain control, you can take these as directed.  It may take up to 1 month to notice benefit from the 1st treatment  If you do notice relief, botox can be done every 3 months.  It may require insurance authorization everytime.    For questions about insurance coverage, please call the main clinic number and ask to speak with someone about botox coverage.     Pain Clinic phone number during work hours (Monday through Friday 8 am-4:30 pm) at [phone] or the Provider Line after hours at [phone]:

## 2022-12-12 ENCOUNTER — VIRTUAL VISIT (OUTPATIENT)
Dept: FAMILY MEDICINE | Facility: CLINIC | Age: 37
End: 2022-12-12
Payer: COMMERCIAL

## 2022-12-12 DIAGNOSIS — F41.1 GENERALIZED ANXIETY DISORDER: ICD-10-CM

## 2022-12-12 DIAGNOSIS — R03.0 ELEVATED BLOOD PRESSURE READING WITHOUT DIAGNOSIS OF HYPERTENSION: ICD-10-CM

## 2022-12-12 DIAGNOSIS — F90.8 ADHD, ADULT RESIDUAL TYPE: Primary | ICD-10-CM

## 2022-12-12 DIAGNOSIS — F33.1 MODERATE EPISODE OF RECURRENT MAJOR DEPRESSIVE DISORDER (H): ICD-10-CM

## 2022-12-12 PROCEDURE — 99214 OFFICE O/P EST MOD 30 MIN: CPT | Mod: 95 | Performed by: STUDENT IN AN ORGANIZED HEALTH CARE EDUCATION/TRAINING PROGRAM

## 2022-12-12 RX ORDER — LISDEXAMFETAMINE DIMESYLATE 30 MG/1
30 CAPSULE ORAL EVERY MORNING
Qty: 30 CAPSULE | Refills: 0 | Status: SHIPPED | OUTPATIENT
Start: 2022-12-18 | End: 2023-03-13

## 2022-12-12 RX ORDER — LISDEXAMFETAMINE DIMESYLATE 30 MG/1
30 CAPSULE ORAL EVERY MORNING
Qty: 30 CAPSULE | Refills: 0 | Status: SHIPPED | OUTPATIENT
Start: 2023-01-18 | End: 2023-03-13

## 2022-12-12 RX ORDER — LISDEXAMFETAMINE DIMESYLATE 30 MG/1
30 CAPSULE ORAL EVERY MORNING
Qty: 30 CAPSULE | Refills: 0 | Status: SHIPPED | OUTPATIENT
Start: 2023-02-18 | End: 2023-03-13

## 2022-12-12 ASSESSMENT — PATIENT HEALTH QUESTIONNAIRE - PHQ9: SUM OF ALL RESPONSES TO PHQ QUESTIONS 1-9: 11

## 2022-12-12 NOTE — PROGRESS NOTES
Chelle is a 37 year old who is being evaluated via a billable video visit.      How would you like to obtain your AVS? MyChart  If the video visit is dropped, the invitation should be resent by: Text to cell phone: [phone]  Will anyone else be joining your video visit? No    Assessment & Plan     ADHD, adult residual type  Vyvanse working well per patient. PDMP appropriate. Will refill x3 months and have pt follow up in office for BP check.    Generalized anxiety disorder  GAD7 of 7 on 9/14/22. States Vyvanse has not influenced anxiety. Is also on bupropion for depression which may be contributing. On propranolol daily (up to 3x daily) but forgetting to take many times during the day. No interest in changing medication - will refill as needed.    Moderate episode of recurrent major depressive disorder (H)  PHQ9 of 11. On bupropion. Doesn't want to transition medications. Refill when needed.    Elevated blood pressure reading without diagnosis of hypertension  Intermittently elevated during office visits but also some BPs as low as 100/80. Is on Vyvanse and bupropion as above which may be influencing BP.  Last office BP elevated (but just prior to Botox injections). Highly recommended pt obtain at home BP cuff to monitor at home. Follow up in 3 months with repeat in office BP check.     Is interested in completing blood work next appt - can consider BMP testing as last one in 2019 with low potassium/calcium).    Return in about 3 months (around 3/12/2023).    Suman Au MD  [facility]  12/12/2022      Subjective   Chelle is a 37 year old presenting for the following health issues:    Recheck Medication    HPI     Medication Followup of Propanolol     Taking Medication as prescribed: NO-Forgets to take it 3 times a day. Only taking once in the morning     Side Effects:  None    Medication Helping Symptoms:  yes    ADHD  Vyvanse has been working ok.  No chest pain, palpitations. No side  effects  Tolerating well.    Anxiety  Propranolol. Seems to be working for her. Is taking once daily in the morning.   Not remembering to take it for the remainder of the day though.  Seems to help. Anxiety is not worsening with stimulant - Vyvanse.    Depression  Up and down. Feels ok with where she is at.  Doesn't want to try any other medications though.     BP  No blood pressure cuff at home.  Normally had been more even in the past.  Was running around and drinking coffee with the other measurements in the past.    Wonders about her last labs back in 2019        Objective         Vitals:  No vitals were obtained today due to virtual visit.    Physical Exam   N/A with telephone visit    Telephone-Visit Details    Telephone Start Time: 10:38 AM    Type of service:  Telephone Visit    Telephone End Time:11:02 AM    Originating Location (pt. Location): Home    Distant Location (provider location):  On-site    Platform used for Video Visit: Brigida

## 2022-12-12 NOTE — PATIENT INSTRUCTIONS
How to Check Your Blood Pressure at Home    Make sure your blood pressure cuff is validated (produces accurate readings). Go to validateBP.org for a list of blood pressure cuffs.    Take your blood pressure 2 times right after you wake up and 2 times right before going to bed for a total of 4 readings in one day.  Make sure the readings are before you take your medications, smoke and after you empty your bladder.   Sit with good back support and feet flat on the floor. Have your blood pressure cuff resting at your heart level.    Write these numbers down for the next 3 days. Goal is to be below 140 on top and 90 on the bottom.

## 2022-12-29 ENCOUNTER — TELEPHONE (OUTPATIENT)
Dept: FAMILY MEDICINE | Facility: CLINIC | Age: 37
End: 2022-12-29

## 2022-12-29 NOTE — TELEPHONE ENCOUNTER
Upon chart review, [facility] pharmacy should have prescription available for pt.  Sent 12/18/22.  Called pt and advised to speak to a real person at pharmacy to request the refill and to call us back if continues to have difficulty.    Shreya Sanderson RN, BSN  [facility]

## 2022-12-29 NOTE — TELEPHONE ENCOUNTER
Reason for Call:  Other prescription    Detailed comments: Pt needs med refill of: VYVANCE, pt is out of meds today    Phone Number Patient can be reached at: Cell number on file:    Telephone Information:   Mobile [phone]       Best Time: ANY    Can we leave a detailed message on this number? YES    Call taken on 12/29/2022 at 9:10 AM by Jacquelyn Soriano

## 2023-02-23 DIAGNOSIS — F41.1 GENERALIZED ANXIETY DISORDER: ICD-10-CM

## 2023-02-26 NOTE — TELEPHONE ENCOUNTER
PHQ-9 score:    PHQ 12/12/2022   PHQ-9 Total Score 11   Q9: Thoughts of better off dead/self-harm past 2 weeks Not at all       Routing refill request to provider for review/approval because:  Drug not on the [facility] refill protocol

## 2023-02-27 DIAGNOSIS — F41.1 GENERALIZED ANXIETY DISORDER: ICD-10-CM

## 2023-02-27 RX ORDER — BUPROPION HYDROCHLORIDE 150 MG/1
TABLET ORAL
Qty: 90 TABLET | Refills: 1 | Status: SHIPPED | OUTPATIENT
Start: 2023-02-27 | End: 2023-08-22

## 2023-02-27 NOTE — TELEPHONE ENCOUNTER
Brief chart review.    Will refill bupropion for patient.    Suman Au MD  [facility]  2/27/2023

## 2023-02-28 RX ORDER — BUPROPION HYDROCHLORIDE 150 MG/1
TABLET ORAL
Qty: 90 TABLET | Refills: 0 | OUTPATIENT
Start: 2023-02-28

## 2023-02-28 NOTE — TELEPHONE ENCOUNTER
Signed Yesterday (2/27/2023):   buPROPion (WELLBUTRIN XL) 150 MG 24 hr tablet   Sig: TAKE 1 TABLET BY MOUTH DAILY   Disp:  90 tablet    Refills:  1   Madai Washburn RN

## 2023-03-06 DIAGNOSIS — F90.8 ADHD, ADULT RESIDUAL TYPE: ICD-10-CM

## 2023-03-09 ENCOUNTER — OFFICE VISIT (OUTPATIENT)
Dept: PALLIATIVE MEDICINE | Facility: CLINIC | Age: 38
End: 2023-03-09
Payer: COMMERCIAL

## 2023-03-09 VITALS — OXYGEN SATURATION: 100 % | SYSTOLIC BLOOD PRESSURE: 127 MMHG | HEART RATE: 84 BPM | DIASTOLIC BLOOD PRESSURE: 87 MMHG

## 2023-03-09 DIAGNOSIS — G43.009 MIGRAINE WITHOUT AURA AND WITHOUT STATUS MIGRAINOSUS, NOT INTRACTABLE: Primary | ICD-10-CM

## 2023-03-09 PROCEDURE — 64615 CHEMODENERV MUSC MIGRAINE: CPT | Performed by: ANESTHESIOLOGY

## 2023-03-09 RX ORDER — LISDEXAMFETAMINE DIMESYLATE 30 MG/1
30 CAPSULE ORAL EVERY MORNING
Qty: 30 CAPSULE | Refills: 0 | OUTPATIENT
Start: 2023-03-09

## 2023-03-09 ASSESSMENT — PAIN SCALES - GENERAL: PAINLEVEL: MODERATE PAIN (4)

## 2023-03-09 NOTE — PROGRESS NOTES
"Procedure note for Botox:  Pre procedure Diagnosis: Chronic Migraine     Post procedure Diagnosis: Same  Procedure performed: Botox Injections  Anesthesia: none  Complications: none  Operators: Vivian Hoffman MD     Indications:    Chelle Noyola is a 38 year old female who presents to clinic today for botox injections.  They have a history of chronic migraine headaches, more than 14 days/month.  Exam shows tenderness over the occipital and temporal muscles and they have tried conservative treatment including multiple headache medications and exercises.They report that with botox injections, the headaches are more manageable. The intensity is reduced about 70% but the duration and number of headaches has reduced significantly.    Options/alternatives, benefits and risks were discussed with the patient including bleeding, infection, pneumothorax, weakness, and headache flare.   Questions were answered and she agrees to proceed. Voluntary informed consent was obtained and signed.     Response to previous Botox treatment:   Last Botox Date: 12/8/2022   Total Unit: 155U    1. Headache frequency: 20 headache days per month. This is compared to his baseline headache frequency of same headache days per month. However the headaches are dulled with the botox and this makes them \"a lot better\"     2. Headache duration during this injection cycle: Headache duration has decreased.       3. Headache intensity during this injection cycle:    1-5/10 = Typical pain level   5/10 = Worst pain level   1/10 = Lowest pain level     4. Change in headache medication usage: ibuprofen, tylenol, baclofen - does not need these at all with the Botox injections     5. ER Visits During This Injection Cycle: NONE     6. Functional Performance: Change in ADL's, social interaction, days lost from work, etc. Patient reports being able to more fully participate in social and family activities and responsibilities as headache symptoms have " improved.    Vitals were reviewed: Yes  Allergies were reviewed:  Yes    Medications were reviewed:  Yes       Procedure:  After getting informed consent, a Pause for the Cause was performed.  Patient was prepped and draped with chloroprep.    A 27 gauge needle was used to make the injections.  After negative aspiration, botox was injected bilaterally into the following locations:    Procerus- 5 units (1 site)  Frontals- 20 units (4 sites)   -10 units (2 sites)  Temporalis- 40 units (8 sites)  Occipitis- 30 units (6 sites)  Cervical paraspinals- 20 units (4 sites).  Upper Trapezius- 30 units (6 sites)           Hemostasis was achieved.    Total units used: 155  Total units wasted: 45  Botox lot numbers and Expiration dates:  SEE MAR      Bandaids were placed when appropriate.  The patient tolerated the procedure well.    Follow-up includes:    -can be repeated after 3 full months have passed. 6/15/2023 @ 10AM IN PERSON      DARIO AWAD MD   Pain Management & Addiction Medicine

## 2023-03-09 NOTE — PATIENT INSTRUCTIONS
[facility]  Botox Injection Discharge Instructions    Do not rub or put extended pressure on the injection sites. You may gently touch the sites to remove any excess blood.  Monitor the injection sites for signs and symptoms of infection such as redness, swelling, warmth, fever, chills, or drainage to areas.  You may have soreness at the injection sites for up to 24 hours.  If you are able to use anti-inflammatory medications or Tylenol for pain control, you can take these as directed.  It may take up to 1 month to notice benefit from the 1st treatment  If you do notice relief, botox can be done every 3 months.  It may require insurance authorization everytime.    For questions about insurance coverage, please call the main clinic number and ask to speak with someone about botox coverage.     Pain Clinic phone number during work hours (Monday through Friday 8 am-4:30 pm) at [phone] or the Provider Line after hours at [phone]:

## 2023-03-13 ENCOUNTER — OFFICE VISIT (OUTPATIENT)
Dept: FAMILY MEDICINE | Facility: CLINIC | Age: 38
End: 2023-03-13
Payer: COMMERCIAL

## 2023-03-13 VITALS
BODY MASS INDEX: 22.6 KG/M2 | WEIGHT: 144 LBS | OXYGEN SATURATION: 99 % | TEMPERATURE: 98.1 F | RESPIRATION RATE: 16 BRPM | SYSTOLIC BLOOD PRESSURE: 118 MMHG | HEIGHT: 67 IN | HEART RATE: 74 BPM | DIASTOLIC BLOOD PRESSURE: 80 MMHG

## 2023-03-13 DIAGNOSIS — F33.1 MODERATE EPISODE OF RECURRENT MAJOR DEPRESSIVE DISORDER (H): ICD-10-CM

## 2023-03-13 DIAGNOSIS — F17.200 TOBACCO USE DISORDER: ICD-10-CM

## 2023-03-13 DIAGNOSIS — I73.00 RAYNAUD'S DISEASE WITHOUT GANGRENE: ICD-10-CM

## 2023-03-13 DIAGNOSIS — F41.1 GENERALIZED ANXIETY DISORDER: ICD-10-CM

## 2023-03-13 DIAGNOSIS — F90.8 ADHD, ADULT RESIDUAL TYPE: ICD-10-CM

## 2023-03-13 DIAGNOSIS — L65.9 HAIR LOSS: ICD-10-CM

## 2023-03-13 DIAGNOSIS — R03.0 ELEVATED BLOOD PRESSURE READING WITHOUT DIAGNOSIS OF HYPERTENSION: ICD-10-CM

## 2023-03-13 DIAGNOSIS — F90.8 ADHD, ADULT RESIDUAL TYPE: Primary | ICD-10-CM

## 2023-03-13 LAB
ANION GAP SERPL CALCULATED.3IONS-SCNC: 11 MMOL/L (ref 7–15)
BUN SERPL-MCNC: 15.9 MG/DL (ref 6–20)
CALCIUM SERPL-MCNC: 9.3 MG/DL (ref 8.6–10)
CHLORIDE SERPL-SCNC: 102 MMOL/L (ref 98–107)
CHOLEST SERPL-MCNC: 193 MG/DL
CREAT SERPL-MCNC: 0.81 MG/DL (ref 0.51–0.95)
DEPRECATED HCO3 PLAS-SCNC: 25 MMOL/L (ref 22–29)
FERRITIN SERPL-MCNC: 76 NG/ML (ref 6–175)
GFR SERPL CREATININE-BSD FRML MDRD: >90 ML/MIN/1.73M2
GLUCOSE SERPL-MCNC: 87 MG/DL (ref 70–99)
HDLC SERPL-MCNC: 81 MG/DL
LDLC SERPL CALC-MCNC: 102 MG/DL
NONHDLC SERPL-MCNC: 112 MG/DL
POTASSIUM SERPL-SCNC: 4.8 MMOL/L (ref 3.4–5.3)
SODIUM SERPL-SCNC: 138 MMOL/L (ref 136–145)
TRIGL SERPL-MCNC: 52 MG/DL
TSH SERPL DL<=0.005 MIU/L-ACNC: 1 UIU/ML (ref 0.3–4.2)

## 2023-03-13 PROCEDURE — 82728 ASSAY OF FERRITIN: CPT | Performed by: STUDENT IN AN ORGANIZED HEALTH CARE EDUCATION/TRAINING PROGRAM

## 2023-03-13 PROCEDURE — 36415 COLL VENOUS BLD VENIPUNCTURE: CPT | Performed by: STUDENT IN AN ORGANIZED HEALTH CARE EDUCATION/TRAINING PROGRAM

## 2023-03-13 PROCEDURE — 90471 IMMUNIZATION ADMIN: CPT | Performed by: STUDENT IN AN ORGANIZED HEALTH CARE EDUCATION/TRAINING PROGRAM

## 2023-03-13 PROCEDURE — 80061 LIPID PANEL: CPT | Performed by: STUDENT IN AN ORGANIZED HEALTH CARE EDUCATION/TRAINING PROGRAM

## 2023-03-13 PROCEDURE — 84443 ASSAY THYROID STIM HORMONE: CPT | Performed by: STUDENT IN AN ORGANIZED HEALTH CARE EDUCATION/TRAINING PROGRAM

## 2023-03-13 PROCEDURE — 96127 BRIEF EMOTIONAL/BEHAV ASSMT: CPT | Performed by: STUDENT IN AN ORGANIZED HEALTH CARE EDUCATION/TRAINING PROGRAM

## 2023-03-13 PROCEDURE — 80048 BASIC METABOLIC PNL TOTAL CA: CPT | Performed by: STUDENT IN AN ORGANIZED HEALTH CARE EDUCATION/TRAINING PROGRAM

## 2023-03-13 PROCEDURE — 99214 OFFICE O/P EST MOD 30 MIN: CPT | Mod: 25 | Performed by: STUDENT IN AN ORGANIZED HEALTH CARE EDUCATION/TRAINING PROGRAM

## 2023-03-13 PROCEDURE — 90715 TDAP VACCINE 7 YRS/> IM: CPT | Performed by: STUDENT IN AN ORGANIZED HEALTH CARE EDUCATION/TRAINING PROGRAM

## 2023-03-13 RX ORDER — LISDEXAMFETAMINE DIMESYLATE 30 MG/1
30 CAPSULE ORAL EVERY MORNING
Qty: 30 CAPSULE | Refills: 0 | Status: SHIPPED | OUTPATIENT
Start: 2023-04-12 | End: 2023-05-12

## 2023-03-13 RX ORDER — LISDEXAMFETAMINE DIMESYLATE 30 MG/1
30 CAPSULE ORAL EVERY MORNING
Qty: 30 CAPSULE | Refills: 0 | Status: SHIPPED | OUTPATIENT
Start: 2023-03-13 | End: 2023-04-12

## 2023-03-13 RX ORDER — LISDEXAMFETAMINE DIMESYLATE 30 MG/1
30 CAPSULE ORAL EVERY MORNING
Qty: 30 CAPSULE | Refills: 0 | Status: SHIPPED | OUTPATIENT
Start: 2023-05-12 | End: 2023-09-18

## 2023-03-13 ASSESSMENT — ANXIETY QUESTIONNAIRES
GAD7 TOTAL SCORE: 8
5. BEING SO RESTLESS THAT IT IS HARD TO SIT STILL: SEVERAL DAYS
7. FEELING AFRAID AS IF SOMETHING AWFUL MIGHT HAPPEN: SEVERAL DAYS
1. FEELING NERVOUS, ANXIOUS, OR ON EDGE: SEVERAL DAYS
3. WORRYING TOO MUCH ABOUT DIFFERENT THINGS: SEVERAL DAYS
IF YOU CHECKED OFF ANY PROBLEMS ON THIS QUESTIONNAIRE, HOW DIFFICULT HAVE THESE PROBLEMS MADE IT FOR YOU TO DO YOUR WORK, TAKE CARE OF THINGS AT HOME, OR GET ALONG WITH OTHER PEOPLE: SOMEWHAT DIFFICULT
7. FEELING AFRAID AS IF SOMETHING AWFUL MIGHT HAPPEN: SEVERAL DAYS
2. NOT BEING ABLE TO STOP OR CONTROL WORRYING: SEVERAL DAYS
4. TROUBLE RELAXING: SEVERAL DAYS
GAD7 TOTAL SCORE: 8
GAD7 TOTAL SCORE: 8
6. BECOMING EASILY ANNOYED OR IRRITABLE: MORE THAN HALF THE DAYS
8. IF YOU CHECKED OFF ANY PROBLEMS, HOW DIFFICULT HAVE THESE MADE IT FOR YOU TO DO YOUR WORK, TAKE CARE OF THINGS AT HOME, OR GET ALONG WITH OTHER PEOPLE?: SOMEWHAT DIFFICULT

## 2023-03-13 ASSESSMENT — PAIN SCALES - GENERAL: PAINLEVEL: NO PAIN (0)

## 2023-03-13 NOTE — PROGRESS NOTES
Assessment & Plan     ADHD, adult residual type  Doing well on 30mg Vyvanse. PDMP appropriate. Will refill x 3 months. Ok to refill at 3 month interval. Due for follow up (in-person) visit in 6 months.  - lisdexamfetamine (VYVANSE) 30 MG capsule  Dispense: 30 capsule; Refill: 0  - lisdexamfetamine (VYVANSE) 30 MG capsule  Dispense: 30 capsule; Refill: 0  - lisdexamfetamine (VYVANSE) 30 MG capsule  Dispense: 30 capsule; Refill: 0    Moderate episode of recurrent major depressive disorder (H)  Generalized anxiety disorder  PHQ of 7 from 11 last visit and ISABEL of 8 from 7 last visit. Stable on bupropion, 150mg daily and propranolol 10mg TID (when remembering). Feels mood is stable, would like to continue current medications.    Elevated BP reading without dx of HTN  Has had elevated BPs while on stimulants. Has not obtained BP cuff to use at-home. Has not taking stimulant this morning. Discussed concern for prolonged HTN 2/2 stimulant pharmacotherapy can also has detrimental long-term effects thus would recommend at home BP readings.    Hair loss  Diffuse hair thinning for past 6 months.   - Ferritin  - TSH with free T4 reflex  - Lipid panel reflex to direct LDL Fasting  - Basic metabolic panel  (Ca, Cl, CO2, Creat, Gluc, K, Na, BUN)    Raynaud's disease without gangrene  Tobacco use disorder  Noting intermittent Raynaud's phenomenon ever since COVID in 1/2022. Good radial pulses bilaterally. No evidence of ulceration. No symptoms/signs of systemic diseases. May be 2/2 or exacerbated by stimulant therapy. Will discuss with patient to trial holding stimulant therapy to see if symptoms resolve. Consider additional workup as necessary.    Return in about 6 months (around 9/13/2023) for Follow up.    Suman Au MD  [facility] ADITIMOKINSEY Corbett is a 38 year old presenting for the following health issues:    Follow Up and Medication Refill    Medication Refill    History of Present Illness        Mental Health Follow-up:  Patient presents to follow-up on Depression & Anxiety.Patient's depression since last visit has been:  Good  The patient is not having other symptoms associated with depression.  Patient's anxiety since last visit has been:  Good  The patient is not having other symptoms associated with anxiety.  Any significant life events: No  Patient is not feeling anxious or having panic attacks.  Patient has no concerns about alcohol or drug use.    She eats 2-3 servings of fruits and vegetables daily.She consumes 1 sweetened beverage(s) daily.She exercises with enough effort to increase her heart rate 10 to 19 minutes per day.  She exercises with enough effort to increase her heart rate 3 or less days per week.   She is taking medications regularly.    Today's PHQ-9         PHQ-9 Total Score: 7    PHQ-9 Q9 Thoughts of better off dead/self-harm past 2 weeks :   Not at all    How difficult have these problems made it for you to do your work, take care of things at home, or get along with other people: Somewhat difficult  Today's ISABEL-7 Score: 8     Taking propranolol in the morning, and then additionally throughout the day if able to remember.  Feels the anxiety has been the same really even prior to being on the Vyvyanse    Taking Vyvanse -   No side effects, no insomnia, heart palpitations  No worsened headaches. Gets botox for this    Didn't buy the blood pressure cuff.    Hair loss  Over the 6 months  Seems to be all over hair loss   Didn't have COVID recently.   It's here and there - seems to be noticeably different  Thinner in a ponytail than before.  Taking vitamins    Getting Raynaud's   Noticed this after having COVID. Around 1/2022.  No myalgias, athralgias, rashes.   Does smoke occasionally - only socially    Review of Systems   See HPI for pert pos and neg      Objective    /80 (BP Location: Right arm, Patient Position: Sitting, Cuff Size: Adult Regular)   Pulse 74   Temp 98.1  " F (36.7  C) (Tympanic)   Resp 16   Ht 1.702 m (5' 7\")   Wt 65.3 kg (144 lb)   SpO2 99%   BMI 22.55 kg/m    Body mass index is 22.55 kg/m .     Physical Exam   GENERAL: healthy, alert and no distress  HEAD: Normocephalic, atraumatic.   EYES: Normal conjunctivae, sclera.   NECK: Supple. No lymphadenopathy appreciated. Trachea midline. Thyroid not enlarged, not TTP.  RESP: lungs clear to auscultation - no rales, rhonchi or wheezes  CV: regular rate and rhythm, normal S1 S2, no murmur, click, rub or gallop.  No peripheral swelling noted.   ABDOMEN: soft, no TTP x4 quadrants. No hepatomegaly or masses appreciated. BS normactive.  MSK: no gross musculoskeletal defects noted.  SKIN: no suspicious lesions or rashes.  EXT: Warm and well perfused. Radial pulses 2+ bilaterally. No digital ulceration/wounds bilaterally.  NEURO: CNII-XII grossly intact. No focal deficits.  PSYCH: Groomed, dressed appropriately for weather. Normal mood with consistent affect.           "

## 2023-03-14 RX ORDER — LISDEXAMFETAMINE DIMESYLATE 30 MG/1
CAPSULE ORAL
Qty: 30 CAPSULE | Refills: 0 | OUTPATIENT
Start: 2023-03-14

## 2023-03-15 NOTE — TELEPHONE ENCOUNTER
Sent Vyvanse prescription via office visit encounter on 3/13/23.    Suman Au MD  [facility]  3/15/2023

## 2023-06-13 DIAGNOSIS — F90.8 ADHD, ADULT RESIDUAL TYPE: ICD-10-CM

## 2023-06-13 RX ORDER — LISDEXAMFETAMINE DIMESYLATE 30 MG/1
30 CAPSULE ORAL DAILY
Qty: 30 CAPSULE | Refills: 0 | Status: SHIPPED | OUTPATIENT
Start: 2023-06-13 | End: 2023-07-13

## 2023-06-13 RX ORDER — LISDEXAMFETAMINE DIMESYLATE 30 MG/1
CAPSULE ORAL
Qty: 30 CAPSULE | Refills: 0 | OUTPATIENT
Start: 2023-06-13

## 2023-06-13 RX ORDER — LISDEXAMFETAMINE DIMESYLATE 30 MG/1
30 CAPSULE ORAL DAILY
Qty: 30 CAPSULE | Refills: 0 | Status: SHIPPED | OUTPATIENT
Start: 2023-08-14 | End: 2023-09-13

## 2023-06-13 RX ORDER — LISDEXAMFETAMINE DIMESYLATE 30 MG/1
30 CAPSULE ORAL DAILY
Qty: 30 CAPSULE | Refills: 0 | Status: SHIPPED | OUTPATIENT
Start: 2023-07-14 | End: 2023-08-13

## 2023-06-14 NOTE — PROGRESS NOTES
"Procedure note for Botox:  Pre procedure Diagnosis: Chronic Migraine     Post procedure Diagnosis: Same  Procedure performed: Botox Injections  Anesthesia: none  Complications: none  Operators: Vivian Hoffman MD     Indications:    Chelle Noyola is a 38 year old female who presents to clinic today for botox injections.  They have a history of chronic migraine headaches, more than 14 days/month.  Exam shows tenderness over the occipital and temporal muscles and they have tried conservative treatment including multiple headache medications and exercises.They report that with botox injections, the headaches are more manageable. The intensity is reduced about 70% but the duration and number of headaches has reduced significantly.    Options/alternatives, benefits and risks were discussed with the patient including bleeding, infection, pneumothorax, weakness, and headache flare.   Questions were answered and she agrees to proceed. Voluntary informed consent was obtained and signed.     Response to previous Botox treatment:   Last Botox Date: 3/9/2023  Total Unit: 155U    1. Headache frequency: 20 headache days per month. This is compared to his baseline headache frequency of same headache days per month. However the headaches are dulled with the botox and this makes them \"a lot better\"     2. Headache duration during this injection cycle: Headache duration has decreased.       3. Headache intensity during this injection cycle:    1-5/10 = Typical pain level   5/10 = Worst pain level   1/10 = Lowest pain level     4. Change in headache medication usage: ibuprofen, tylenol, baclofen - does not need these at all with the Botox injections     5. ER Visits During This Injection Cycle: NONE     6. Functional Performance: Change in ADL's, social interaction, days lost from work, etc. Patient reports being able to more fully participate in social and family activities and responsibilities as headache symptoms have " improved.    Vitals were reviewed: Yes  Allergies were reviewed:  Yes    Medications were reviewed:  Yes       Procedure:  After getting informed consent, a Pause for the Cause was performed.  Patient was prepped and draped with chloroprep.    A 27 gauge needle was used to make the injections.  After negative aspiration, botox was injected bilaterally into the following locations:    Procerus- 5 units (1 site)  Frontals- 20 units (4 sites)   -10 units (2 sites)  Temporalis- 40 units (8 sites)  Occipitis- 30 units (6 sites)  Cervical paraspinals- 20 units (4 sites).  Upper Trapezius- 30 units (6 sites)           Hemostasis was achieved.    Total units used: 155  Total units wasted: 45  Botox lot numbers and Expiration dates:  SEE MAR      Bandaids were placed when appropriate.  The patient tolerated the procedure well.    Follow-up includes:    -can be repeated after 3 full months have passed.       DARIO AWAD MD   Pain Management & Addiction Medicine

## 2023-06-15 ENCOUNTER — OFFICE VISIT (OUTPATIENT)
Dept: PALLIATIVE MEDICINE | Facility: CLINIC | Age: 38
End: 2023-06-15
Payer: COMMERCIAL

## 2023-06-15 VITALS — HEART RATE: 86 BPM | SYSTOLIC BLOOD PRESSURE: 132 MMHG | DIASTOLIC BLOOD PRESSURE: 90 MMHG | OXYGEN SATURATION: 98 %

## 2023-06-15 DIAGNOSIS — G43.009 MIGRAINE WITHOUT AURA AND WITHOUT STATUS MIGRAINOSUS, NOT INTRACTABLE: Primary | ICD-10-CM

## 2023-06-15 PROCEDURE — 64615 CHEMODENERV MUSC MIGRAINE: CPT | Performed by: ANESTHESIOLOGY

## 2023-06-15 ASSESSMENT — PAIN SCALES - GENERAL: PAINLEVEL: MODERATE PAIN (4)

## 2023-06-15 NOTE — PATIENT INSTRUCTIONS
[facility]  Botox Injection Discharge Instructions    Do not rub or put extended pressure on the injection sites. You may gently touch the sites to remove any excess blood.  Monitor the injection sites for signs and symptoms of infection such as redness, swelling, warmth, fever, chills, or drainage to areas.  You may have soreness at the injection sites for up to 24 hours.  If you are able to use anti-inflammatory medications or Tylenol for pain control, you can take these as directed.  It may take up to 1 month to notice benefit from the 1st treatment  If you do notice relief, botox can be done every 3 months.  It may require insurance authorization everytime.    For questions about insurance coverage, please call the main clinic number and ask to speak with someone about botox coverage.     Pain Clinic phone number during work hours (Monday through Friday 8 am-4:30 pm) at [phone] or the Provider Line after hours at [phone]:

## 2023-07-21 ENCOUNTER — TELEPHONE (OUTPATIENT)
Dept: FAMILY MEDICINE | Facility: CLINIC | Age: 38
End: 2023-07-21
Payer: COMMERCIAL

## 2023-07-21 NOTE — TELEPHONE ENCOUNTER
Medication Question or Refill    Contacts       Type Contact Phone/Fax    07/21/2023 08:06 AM CDT Phone (Incoming) JazmínChelle (Self) [phone] (M)          What medication are you calling about (include dose and sig)?: Vyvanse 30mg    Preferred Pharmacy:   [facility]/pharmacy #[id number] [place], [place] - [address]   [address] [facility] [address]  Phone: [phone] Fax: [phone]    [facility] #[id number] - [place], [place] - [address]   [address]  [facility] [address]  Phone: [phone] Fax: [phone]      Controlled Substance Agreement on file:   CSA -- Patient Level:    CSA: None found at the patient level.       Who prescribed the medication?: pcp    Do you need a refill? Yes    When did you use the medication last? today    Patient offered an appointment? Yes: 9/20/23    Do you have any questions or concerns?  No      Could we send this information to you in [facility] or would you prefer to receive a phone call?:   Patient would prefer a phone call   Okay to leave a detailed message?: Yes at Cell number on file:    Telephone Information:   Mobile [phone]         
Left message for patient to call back. Prescriptions already sent to pharmacy.  Dilia LEBRON RN, BSN    
9

## 2023-07-24 ENCOUNTER — NURSE TRIAGE (OUTPATIENT)
Dept: FAMILY MEDICINE | Facility: CLINIC | Age: 38
End: 2023-07-24
Payer: COMMERCIAL

## 2023-07-24 NOTE — TELEPHONE ENCOUNTER
Called the pt.  Appt scheduled.  Offered earlier appts, but the pt is unable because her son is in summer school this week.
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
## 2023-07-26 ASSESSMENT — PATIENT HEALTH QUESTIONNAIRE - PHQ9
SUM OF ALL RESPONSES TO PHQ QUESTIONS 1-9: 7
10. IF YOU CHECKED OFF ANY PROBLEMS, HOW DIFFICULT HAVE THESE PROBLEMS MADE IT FOR YOU TO DO YOUR WORK, TAKE CARE OF THINGS AT HOME, OR GET ALONG WITH OTHER PEOPLE: SOMEWHAT DIFFICULT
SUM OF ALL RESPONSES TO PHQ QUESTIONS 1-9: 7

## 2023-07-27 ENCOUNTER — OFFICE VISIT (OUTPATIENT)
Dept: FAMILY MEDICINE | Facility: CLINIC | Age: 38
End: 2023-07-27
Payer: COMMERCIAL

## 2023-07-27 VITALS
WEIGHT: 143 LBS | DIASTOLIC BLOOD PRESSURE: 80 MMHG | HEIGHT: 67 IN | SYSTOLIC BLOOD PRESSURE: 121 MMHG | HEART RATE: 88 BPM | TEMPERATURE: 98.5 F | BODY MASS INDEX: 22.44 KG/M2 | OXYGEN SATURATION: 99 % | RESPIRATION RATE: 16 BRPM

## 2023-07-27 DIAGNOSIS — S00.83XD FACIAL HEMATOMA, SUBSEQUENT ENCOUNTER: Primary | ICD-10-CM

## 2023-07-27 PROCEDURE — 99213 OFFICE O/P EST LOW 20 MIN: CPT | Performed by: NURSE PRACTITIONER

## 2023-07-27 RX ORDER — BACLOFEN 20 MG/1
TABLET ORAL
Qty: 30 TABLET | Refills: 0 | Status: CANCELLED | OUTPATIENT
Start: 2023-07-27

## 2023-07-27 ASSESSMENT — PAIN SCALES - GENERAL: PAINLEVEL: MODERATE PAIN (4)
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
## 2023-08-15 ENCOUNTER — PATIENT OUTREACH (OUTPATIENT)
Dept: CARE COORDINATION | Facility: CLINIC | Age: 38
End: 2023-08-15
Payer: COMMERCIAL

## 2023-08-21 DIAGNOSIS — F41.1 GENERALIZED ANXIETY DISORDER: ICD-10-CM

## 2023-08-22 RX ORDER — BUPROPION HYDROCHLORIDE 150 MG/1
TABLET ORAL
Qty: 90 TABLET | Refills: 0 | Status: SHIPPED | OUTPATIENT
Start: 2023-08-22 | End: 2024-03-05

## 2023-08-29 ENCOUNTER — PATIENT OUTREACH (OUTPATIENT)
Dept: CARE COORDINATION | Facility: CLINIC | Age: 38
End: 2023-08-29
Payer: COMMERCIAL

## 2023-09-18 ENCOUNTER — OFFICE VISIT (OUTPATIENT)
Dept: FAMILY MEDICINE | Facility: CLINIC | Age: 38
End: 2023-09-18
Payer: COMMERCIAL

## 2023-09-18 VITALS
HEART RATE: 76 BPM | SYSTOLIC BLOOD PRESSURE: 130 MMHG | OXYGEN SATURATION: 100 % | TEMPERATURE: 98 F | BODY MASS INDEX: 22.48 KG/M2 | RESPIRATION RATE: 14 BRPM | DIASTOLIC BLOOD PRESSURE: 84 MMHG | HEIGHT: 66 IN | WEIGHT: 139.9 LBS

## 2023-09-18 DIAGNOSIS — F41.1 GENERALIZED ANXIETY DISORDER: ICD-10-CM

## 2023-09-18 DIAGNOSIS — F90.8 ADHD, ADULT RESIDUAL TYPE: ICD-10-CM

## 2023-09-18 DIAGNOSIS — F33.1 MODERATE EPISODE OF RECURRENT MAJOR DEPRESSIVE DISORDER (H): ICD-10-CM

## 2023-09-18 DIAGNOSIS — I73.00 RAYNAUD'S DISEASE WITHOUT GANGRENE: ICD-10-CM

## 2023-09-18 DIAGNOSIS — Z00.00 ROUTINE GENERAL MEDICAL EXAMINATION AT A HEALTH CARE FACILITY: Primary | ICD-10-CM

## 2023-09-18 PROCEDURE — 90471 IMMUNIZATION ADMIN: CPT | Performed by: STUDENT IN AN ORGANIZED HEALTH CARE EDUCATION/TRAINING PROGRAM

## 2023-09-18 PROCEDURE — 99396 PREV VISIT EST AGE 40-64: CPT | Mod: 25 | Performed by: STUDENT IN AN ORGANIZED HEALTH CARE EDUCATION/TRAINING PROGRAM

## 2023-09-18 PROCEDURE — 90746 HEPB VACCINE 3 DOSE ADULT IM: CPT | Performed by: STUDENT IN AN ORGANIZED HEALTH CARE EDUCATION/TRAINING PROGRAM

## 2023-09-18 PROCEDURE — 99214 OFFICE O/P EST MOD 30 MIN: CPT | Mod: 25 | Performed by: STUDENT IN AN ORGANIZED HEALTH CARE EDUCATION/TRAINING PROGRAM

## 2023-09-18 RX ORDER — METHYLPHENIDATE HYDROCHLORIDE 36 MG/1
36 TABLET ORAL DAILY
Qty: 30 TABLET | Refills: 0 | Status: SHIPPED | OUTPATIENT
Start: 2023-11-19 | End: 2023-12-05

## 2023-09-18 RX ORDER — METHYLPHENIDATE HYDROCHLORIDE 36 MG/1
36 TABLET ORAL DAILY
Qty: 30 TABLET | Refills: 0 | Status: SHIPPED | OUTPATIENT
Start: 2023-10-19 | End: 2023-11-18

## 2023-09-18 RX ORDER — METHYLPHENIDATE HYDROCHLORIDE 36 MG/1
36 TABLET ORAL DAILY
Qty: 30 TABLET | Refills: 0 | Status: SHIPPED | OUTPATIENT
Start: 2023-09-18 | End: 2023-10-18

## 2023-09-18 RX ORDER — ESCITALOPRAM OXALATE 10 MG/1
10 TABLET ORAL DAILY
Qty: 30 TABLET | Refills: 0 | Status: SHIPPED | OUTPATIENT
Start: 2023-09-18 | End: 2023-10-19

## 2023-09-18 ASSESSMENT — PATIENT HEALTH QUESTIONNAIRE - PHQ9
5. POOR APPETITE OR OVEREATING: MORE THAN HALF THE DAYS
SUM OF ALL RESPONSES TO PHQ QUESTIONS 1-9: 7
SUM OF ALL RESPONSES TO PHQ QUESTIONS 1-9: 7
10. IF YOU CHECKED OFF ANY PROBLEMS, HOW DIFFICULT HAVE THESE PROBLEMS MADE IT FOR YOU TO DO YOUR WORK, TAKE CARE OF THINGS AT HOME, OR GET ALONG WITH OTHER PEOPLE: SOMEWHAT DIFFICULT

## 2023-09-18 ASSESSMENT — ENCOUNTER SYMPTOMS
ARTHRALGIAS: 0
PARESTHESIAS: 0
HEMATOCHEZIA: 0
EYE PAIN: 0
DIZZINESS: 0
SHORTNESS OF BREATH: 0
NERVOUS/ANXIOUS: 0
DIARRHEA: 0
ABDOMINAL PAIN: 0
FEVER: 0
DYSURIA: 0
MYALGIAS: 0
HEMATURIA: 0
PALPITATIONS: 0
CONSTIPATION: 0
WEAKNESS: 0
CHILLS: 0
HEADACHES: 0
HEARTBURN: 0
NAUSEA: 0
FREQUENCY: 0
COUGH: 0
SORE THROAT: 0
JOINT SWELLING: 0

## 2023-09-18 ASSESSMENT — PAIN SCALES - GENERAL: PAINLEVEL: NO PAIN (0)

## 2023-09-18 ASSESSMENT — ANXIETY QUESTIONNAIRES
6. BECOMING EASILY ANNOYED OR IRRITABLE: MORE THAN HALF THE DAYS
5. BEING SO RESTLESS THAT IT IS HARD TO SIT STILL: MORE THAN HALF THE DAYS
IF YOU CHECKED OFF ANY PROBLEMS ON THIS QUESTIONNAIRE, HOW DIFFICULT HAVE THESE PROBLEMS MADE IT FOR YOU TO DO YOUR WORK, TAKE CARE OF THINGS AT HOME, OR GET ALONG WITH OTHER PEOPLE: SOMEWHAT DIFFICULT
2. NOT BEING ABLE TO STOP OR CONTROL WORRYING: MORE THAN HALF THE DAYS
3. WORRYING TOO MUCH ABOUT DIFFERENT THINGS: MORE THAN HALF THE DAYS
GAD7 TOTAL SCORE: 14
7. FEELING AFRAID AS IF SOMETHING AWFUL MIGHT HAPPEN: MORE THAN HALF THE DAYS
GAD7 TOTAL SCORE: 14
1. FEELING NERVOUS, ANXIOUS, OR ON EDGE: MORE THAN HALF THE DAYS

## 2023-09-18 NOTE — PROGRESS NOTES
Prior to immunization administration, verified patients identity using patient s name and date of birth. Please see Immunization Activity for additional information.     Screening Questionnaire for Adult Immunization    Are you sick today?   No   Do you have allergies to medications, food, a vaccine component or latex?   No   Have you ever had a serious reaction after receiving a vaccination?   No   Do you have a long-term health problem with heart, lung, kidney, or metabolic disease (e.g., diabetes), asthma, a blood disorder, no spleen, complement component deficiency, a cochlear implant, or a spinal fluid leak?  Are you on long-term aspirin therapy?   No   Do you have cancer, leukemia, HIV/AIDS, or any other immune system problem?   No   Do you have a parent, brother, or sister with an immune system problem?   No   In the past 3 months, have you taken medications that affect  your immune system, such as prednisone, other steroids, or anticancer drugs; drugs for the treatment of rheumatoid arthritis, Crohn s disease, or psoriasis; or have you had radiation treatments?   No   Have you had a seizure, or a brain or other nervous system problem?   No   During the past year, have you received a transfusion of blood or blood    products, or been given immune (gamma) globulin or antiviral drug?   No   For women: Are you pregnant or is there a chance you could become       pregnant during the next month?   No   Have you received any vaccinations in the past 4 weeks?   No     Immunization questionnaire answers were all negative.      Patient instructed to remain in clinic for 15 minutes afterwards, and to report any adverse reactions.     Screening performed by Cecily Maurice on 9/18/2023 at 11:08 AM.

## 2023-09-18 NOTE — PROGRESS NOTES
SUBJECTIVE:   CC: Chelle is an 38 year old who presents for preventive health visit.       9/18/2023    10:11 AM   Additional Questions   Roomed by MR   Accompanied by VANESSA         9/18/2023    10:11 AM   Patient Reported Additional Medications   Patient reports taking the following new medications VANESSA     Healthy Habits:     Getting at least 3 servings of Calcium per day:  NO    Bi-annual eye exam:  NO    Dental care twice a year:  Yes    Sleep apnea or symptoms of sleep apnea:  None    Diet:  Regular (no restrictions)    Frequency of exercise:  2-3 days/week    Duration of exercise:  Less than 15 minutes    Taking medications regularly:  Yes    Medication side effects:  None    Additional concerns today:  Yes    ADHD  Stopped the Vyvanse after hearing about the Raynaud's.  Hasn't noticed any Raynaud's symptoms since that time.  Did restart some of her leftover Concerta that she had in the past.  On this for the past 2 weeks and hasn't noticed any Raynaud symptoms since that time.  Didn't notice Raynauds when she was Concerta a while ago either.  Feels this dose has been fine for her.     Mood  Has been thinking about dabbling in some medications for anxiety  Does feel like propranolol does help but unable to take TID, just unable to remember this all the time.  Has special needs child and does get anxious regarding this.  Feels this anxiety did improve somewhat when she stopped the stimulants    Today's PHQ-9 Score:       9/18/2023    10:12 AM   PHQ-9 SCORE   PHQ-9 Total Score MyChart 7 (Mild depression)   PHQ-9 Total Score 7     Medication Followup of Vyvanse  Taking Medication as prescribed: yes  Side Effects:  Yes- raynaud's acting up    Medication Helping Symptoms:  NO  Stopped for a week and noticed her symptoms went away    She was on Concerta 36 mg prescribed by Dr. Echevarria about a year ago- would like to go back on that    Social History     Tobacco Use    Smoking status: Some Days     Types: Cigarettes     Smokeless tobacco: Never    Tobacco comments:     every now and then   Substance Use Topics    Alcohol use: Yes     Comment: 2 Drinks per Week         2023    10:16 AM   Alcohol Use   Prescreen: >3 drinks/day or >7 drinks/week? No     Reviewed orders with patient.  Reviewed health maintenance and updated orders accordingly - yes    Breast Cancer Screenin/18/2023    10:16 AM   Breast CA Risk Assessment (FHS-7)   Do you have a family history of breast, colon, or ovarian cancer? No / Unknown     Mammogram Screening - Offered annual screening and updated Health Maintenance for mutual plan based on risk factor consideration.  Pertinent mammograms are reviewed under the imaging tab.    History of abnormal Pap smear: NO - age 30-65 PAP every 5 years with negative HPV co-testing recommended      Latest Ref Rng & Units 2022     1:26 PM   PAP / HPV   PAP  Negative for Intraepithelial Lesion or Malignancy (NILM)    HPV 16 DNA Negative Negative    HPV 18 DNA Negative Negative    Other HR HPV Negative Negative      Reviewed and updated as needed this visit by clinical staff   Tobacco  Allergies  Meds  Problems  Med Hx  Surg Hx  Fam Hx          Reviewed and updated as needed this visit by Provider   Tobacco  Allergies   Problems  Med Hx  Surg Hx  Fam Hx           Review of Systems   Constitutional:  Negative for chills and fever.   HENT:  Negative for congestion, ear pain, hearing loss and sore throat.    Eyes:  Negative for pain and visual disturbance.   Respiratory:  Negative for cough and shortness of breath.    Cardiovascular:  Negative for chest pain, palpitations and peripheral edema.   Gastrointestinal:  Negative for abdominal pain, constipation, diarrhea, heartburn, hematochezia and nausea.   Genitourinary:  Negative for dysuria, frequency, genital sores, hematuria and urgency.   Musculoskeletal:  Negative for arthralgias, joint swelling and myalgias.   Skin:  Negative for rash.  "  Neurological:  Negative for dizziness, weakness, headaches and paresthesias.   Psychiatric/Behavioral:  Negative for mood changes. The patient is not nervous/anxious.      OBJECTIVE:   /84 (BP Location: Right arm, Patient Position: Sitting, Cuff Size: Adult Regular)   Pulse 76   Temp 98  F (36.7  C) (Oral)   Resp 14   Ht 1.683 m (5' 6.25\")   Wt 63.5 kg (139 lb 14.4 oz)   LMP  (LMP Unknown)   SpO2 100%   BMI 22.41 kg/m      Physical Exam  GENERAL: healthy, alert and no distress  HEAD: Normocephalic, atraumatic.   EYES: PERRL. Normal conjunctivae, sclera.   ENT: Normal EAC and TMs bilaterally. Normal oropharynx.   NECK: Supple. No lymphadenopathy appreciated. Trachea midline. Thyroid not enlarged, not TTP.  RESP: lungs clear to auscultation - no rales, rhonchi or wheezes  CV: regular rate and rhythm, normal S1 S2, no murmur, click, rub or gallop. No peripheral swelling noted.   ABDOMEN: soft, no TTP x4 quadrants. No hepatomegaly or masses appreciated. BS normactive.  MSK: no gross musculoskeletal defects noted.  SKIN: no suspicious lesions or rashes.  EXT: Warm and well perfused. DP pulses 2+ bilaterally.  NEURO: CNII-XII grossly intact. No focal deficits.  PSYCH: Groomed, dressed appropriately for weather. Normal mood with consistent affect.     ASSESSMENT/PLAN:   (Z00.00) Routine general medical examination at a health care facility  (primary encounter diagnosis)  Doing well overall. Reviewed last lipid from 6 months ago. Normal glucose 6 months ago. UTD on pap smear. Discussed mammogram and vaccination recommendations    (F90.8) ADHD, adult residual type  (I73.00) Raynaud's disease without gangrene  No Raynaud symptoms since stopping Vyvanse. Has been taking leftover Concerta without issue. Tolerating well. Feels dose is adequately controlling symptoms. PDMP appropriate. Plan to continue on 36mg Concerta.   Plan: methylphenidate HCl ER, OSM, (CONCERTA) 36 MG         CR tablet, methylphenidate HCl " ER, OSM,         (CONCERTA) 36 MG CR tablet, methylphenidate HCl        ER, OSM, (CONCERTA) 36 MG CR tablet    (F33.1) Moderate episode of recurrent major depressive disorder (H)  (F41.1) Generalized anxiety disorder  PHQ of 7; ISABEL of 14. Currently on bupropion and propranolol for mood. Feels that anxiety has been uncontrolled with current pharmacotherapy and ready to retry medications. Plan to start escitalopram for symptoms and stop bupropion in one month (as likely contributing to anxiety). Stimulant therapy, as above, not significantly adding to anxiety per patient.   Plan: escitalopram (LEXAPRO) 10 MG tablet    COUNSELING:  Reviewed preventive health counseling, as reflected in patient instructions    She reports that she has been smoking cigarettes. She has never used smokeless tobacco.          Suman Au MD  [facility]  9/18/2023    [facility].undefined[[id number]^^Answers submitted by the patient for this visit:  Patient Health Questionnaire (Submitted on 9/18/2023)  If you checked off any problems, how difficult have these problems made it for you to do your work, take care of things at home, or get along with other people?: Somewhat difficult  PHQ9 TOTAL SCORE: 7

## 2023-09-25 ENCOUNTER — OFFICE VISIT (OUTPATIENT)
Dept: PALLIATIVE MEDICINE | Facility: CLINIC | Age: 38
End: 2023-09-25
Payer: COMMERCIAL

## 2023-09-25 VITALS — SYSTOLIC BLOOD PRESSURE: 121 MMHG | OXYGEN SATURATION: 100 % | DIASTOLIC BLOOD PRESSURE: 80 MMHG | HEART RATE: 75 BPM

## 2023-09-25 DIAGNOSIS — G43.009 MIGRAINE WITHOUT AURA AND WITHOUT STATUS MIGRAINOSUS, NOT INTRACTABLE: Primary | ICD-10-CM

## 2023-09-25 PROCEDURE — 64615 CHEMODENERV MUSC MIGRAINE: CPT | Performed by: NURSE PRACTITIONER

## 2023-09-25 ASSESSMENT — PAIN SCALES - GENERAL: PAINLEVEL: MODERATE PAIN (4)
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
## 2023-09-25 NOTE — PATIENT INSTRUCTIONS
[facility]  Botox Injection Discharge Instructions    Do not rub or put extended pressure on the injection sites. You may gently touch the sites to remove any excess blood.  Monitor the injection sites for signs and symptoms of infection such as redness, swelling, warmth, fever, chills, or drainage to areas.  You may have soreness at the injection sites for up to 24 hours.  If you are able to use anti-inflammatory medications or Tylenol for pain control, you can take these as directed.  It may take up to 1 month to notice benefit from the 1st treatment  If you do notice relief, botox can be done every 3 months.  It may require insurance authorization everytime.    For questions about insurance coverage, please call the main clinic number and ask to speak with someone about botox coverage.     Pain Clinic phone number during work hours (Monday through Friday 8 am-4:30 pm) at [phone] or the Provider Line after hours at [phone]:

## 2023-10-10 ENCOUNTER — TELEPHONE (OUTPATIENT)
Dept: FAMILY MEDICINE | Facility: CLINIC | Age: 38
End: 2023-10-10
Payer: COMMERCIAL

## 2023-10-10 NOTE — TELEPHONE ENCOUNTER
Pt calls,    S-(situation): calls with update on Methylphenidate, see recent visit     B-(background): has new dose, feels extra muscle tension, wonders if PCP can order less of a dose for 1 or 2 week trial as wonders if related to new dose    A-(assessment): medication question    R-(recommendations): routed to PCP, please advise, route to inform pt on call, heidi Lorenzana RN, BSN  [facility]

## 2023-10-11 ENCOUNTER — TELEPHONE (OUTPATIENT)
Dept: PALLIATIVE MEDICINE | Facility: CLINIC | Age: 38
End: 2023-10-11
Payer: COMMERCIAL

## 2023-10-11 NOTE — TELEPHONE ENCOUNTER
I left a message for the patient to return call to inform her appt on 12/21/23 has been changed to 12/20/23 at 3:00pm inform her to call to confirm appt changes.      Amanda

## 2023-10-12 NOTE — TELEPHONE ENCOUNTER
Has an upcoming visit on 10/23/23. Can discuss at that time.    Thanks,    Suman Au MD  [facility]  10/12/2023

## 2023-10-13 NOTE — TELEPHONE ENCOUNTER
- LMTCB # 2  - Closing encounter for now. Please open encounter again when patient calls back.     Roldan Rush RN on 10/13/2023 at 11:59 AM

## 2023-10-18 DIAGNOSIS — F33.1 MODERATE EPISODE OF RECURRENT MAJOR DEPRESSIVE DISORDER (H): ICD-10-CM

## 2023-10-18 DIAGNOSIS — F41.1 GENERALIZED ANXIETY DISORDER: ICD-10-CM

## 2023-10-19 RX ORDER — ESCITALOPRAM OXALATE 10 MG/1
10 TABLET ORAL DAILY
Qty: 90 TABLET | Refills: 0 | Status: SHIPPED | OUTPATIENT
Start: 2023-10-19 | End: 2024-01-30

## 2023-10-23 ENCOUNTER — OFFICE VISIT (OUTPATIENT)
Dept: FAMILY MEDICINE | Facility: CLINIC | Age: 38
End: 2023-10-23
Payer: COMMERCIAL

## 2023-10-23 VITALS
OXYGEN SATURATION: 100 % | WEIGHT: 141 LBS | BODY MASS INDEX: 22.66 KG/M2 | RESPIRATION RATE: 16 BRPM | DIASTOLIC BLOOD PRESSURE: 89 MMHG | HEART RATE: 91 BPM | HEIGHT: 66 IN | TEMPERATURE: 98.6 F | SYSTOLIC BLOOD PRESSURE: 134 MMHG

## 2023-10-23 DIAGNOSIS — M54.2 NECK PAIN: ICD-10-CM

## 2023-10-23 DIAGNOSIS — F90.8 ADHD, ADULT RESIDUAL TYPE: Primary | ICD-10-CM

## 2023-10-23 DIAGNOSIS — F33.1 MODERATE EPISODE OF RECURRENT MAJOR DEPRESSIVE DISORDER (H): ICD-10-CM

## 2023-10-23 DIAGNOSIS — F41.1 GENERALIZED ANXIETY DISORDER: ICD-10-CM

## 2023-10-23 DIAGNOSIS — G43.009 MIGRAINE WITHOUT AURA AND WITHOUT STATUS MIGRAINOSUS, NOT INTRACTABLE: ICD-10-CM

## 2023-10-23 PROCEDURE — 99214 OFFICE O/P EST MOD 30 MIN: CPT | Performed by: STUDENT IN AN ORGANIZED HEALTH CARE EDUCATION/TRAINING PROGRAM

## 2023-10-23 RX ORDER — METHYLPHENIDATE HYDROCHLORIDE 27 MG/1
27 TABLET ORAL EVERY MORNING
Qty: 30 TABLET | Refills: 0 | Status: SHIPPED | OUTPATIENT
Start: 2023-10-23 | End: 2023-11-22

## 2023-10-23 ASSESSMENT — ANXIETY QUESTIONNAIRES
7. FEELING AFRAID AS IF SOMETHING AWFUL MIGHT HAPPEN: SEVERAL DAYS
4. TROUBLE RELAXING: SEVERAL DAYS
1. FEELING NERVOUS, ANXIOUS, OR ON EDGE: SEVERAL DAYS
IF YOU CHECKED OFF ANY PROBLEMS ON THIS QUESTIONNAIRE, HOW DIFFICULT HAVE THESE PROBLEMS MADE IT FOR YOU TO DO YOUR WORK, TAKE CARE OF THINGS AT HOME, OR GET ALONG WITH OTHER PEOPLE: SOMEWHAT DIFFICULT
3. WORRYING TOO MUCH ABOUT DIFFERENT THINGS: SEVERAL DAYS
GAD7 TOTAL SCORE: 7
6. BECOMING EASILY ANNOYED OR IRRITABLE: SEVERAL DAYS
GAD7 TOTAL SCORE: 7
5. BEING SO RESTLESS THAT IT IS HARD TO SIT STILL: SEVERAL DAYS
2. NOT BEING ABLE TO STOP OR CONTROL WORRYING: SEVERAL DAYS

## 2023-10-23 ASSESSMENT — PATIENT HEALTH QUESTIONNAIRE - PHQ9
SUM OF ALL RESPONSES TO PHQ QUESTIONS 1-9: 7
SUM OF ALL RESPONSES TO PHQ QUESTIONS 1-9: 7
10. IF YOU CHECKED OFF ANY PROBLEMS, HOW DIFFICULT HAVE THESE PROBLEMS MADE IT FOR YOU TO DO YOUR WORK, TAKE CARE OF THINGS AT HOME, OR GET ALONG WITH OTHER PEOPLE: SOMEWHAT DIFFICULT

## 2023-10-23 ASSESSMENT — PAIN SCALES - GENERAL: PAINLEVEL: NO PAIN (0)

## 2023-10-23 NOTE — PROGRESS NOTES
Assessment & Plan     ADHD, adult residual type  Migraine without aura and without status migrainosus, not intractable  Neck pain  Currently on 36mg Concerta HCL. Working well for her but wonders if higher dose is causing additional muscle tension in neck/creating increase in migraine symptoms. Would like to try lower dose. PDMP appropriate. Plan to decrease to 27mg Concerta daily. Patient to [facility] in one month regarding dosage. If symptoms of ADHD uncontrolled on this dose, ok [facility] Follow up in 6months.  - methylphenidate HCL ER, OSM, (CONCERTA) 27 MG CR tablet  Dispense: 30 tablet; Refill: 0    Generalized anxiety disorder  Moderate episode of recurrent major depressive disorder (H)  PHQ stable from last visit but GAD7 improving with transition to selective serotonin reuptake inhibitor. Currently on escitalopram, 10mg for past month. Stopped bupropion few weeks ago. Plan to continue current dose of escitalopram especially as patient may notice additional benefit with reduced dosage of Concerta.  If anxiety is still uncontrolled after one month, patient to [facility] with plan to increase escitalopram to 20mg dose (and set up follow up visit in one month after increased dose to reassess.)    Follow up in 6 months or sooner for mood/ADHD    Suman Au MD  [facility]  10/23/2023    Kumar Corbett is a 38 year old, presenting for the following health issues:  A.D.H.D and Follow Up        10/23/2023    11:10 AM   Additional Questions   Roomed by Fadumo Urena VF     Would like to discuss lowering the dose for methylphenidate.   Says she has been having tension headaches that could be caused by it.    History of Present Illness       Mental Health Follow-up:  Patient presents to follow-up on Depression & Anxiety.Patient's depression since last visit has been:  Better  The patient is not having other symptoms associated with depression.  Patient's anxiety  "since last visit has been:  Better  The patient is not having other symptoms associated with anxiety.  Any significant life events: No  Patient is feeling anxious or having panic attacks.  Patient has no concerns about alcohol or drug use.    She eats 2-3 servings of fruits and vegetables daily.She consumes 1 sweetened beverage(s) daily.She exercises with enough effort to increase her heart rate 20 to 29 minutes per day.  She exercises with enough effort to increase her heart rate 3 or less days per week.   She is taking medications regularly.     Mood  Started the lexapro about one month ago.  Stopped taking the wellbutrin (slowly tapering), last dose was 4-5 days ago.   Feels mood is overall better - specifically with anxiety - not as anxious or on edge as she was in the past.     ADHD  Wanted to see if could do a lower dose.   Does feel like it is making her tense some times.  Wondering if the neck pain/migraines may be due to that.   Unclear if symptoms of neck pain/migraine are worse on days of stimulant or not though. Hasn't paid too much attention.  Would like to try a lower dose though to see if this will improve symptoms.  No Raynaud's symptoms since switching to Concerta        Objective    /89 (BP Location: Right arm, Patient Position: Sitting, Cuff Size: Adult Regular)   Pulse 91   Temp 98.6  F (37  C) (Oral)   Resp 16   Ht 1.683 m (5' 6.25\")   Wt 64 kg (141 lb)   LMP 10/22/2023 (Exact Date)   SpO2 100%   BMI 22.59 kg/m    Body mass index is 22.59 kg/m .    Physical Exam   GENERAL: healthy, alert and no distress  HEAD: Normocephalic, atraumatic.   EYES: Normal conjunctivae, sclera.   RESP: Normal respiratory effort.  MSK: no gross musculoskeletal defects noted.  SKIN: no suspicious lesions or rashes.  NEURO: CNII-XII grossly intact. No focal deficits.  PSYCH: Groomed, dressed appropriately for weather. Logical, linear thought process. Normal mood with consistent affect.     Answers " submitted by the patient for this visit:  Patient Health Questionnaire (Submitted on 10/23/2023)  If you checked off any problems, how difficult have these problems made it for you to do your work, take care of things at home, or get along with other people?: Somewhat difficult  PHQ9 TOTAL SCORE: 7  ISABEL-7 (Submitted on 10/23/2023)  ISABEL 7 TOTAL SCORE: 7

## 2023-12-13 NOTE — PROGRESS NOTES
"Procedure note for Botox:  Pre procedure Diagnosis: Chronic Migraine     Post procedure Diagnosis: Same  Procedure performed: Botox Injections  Anesthesia: none  Complications: none  Operators: Vivian Hoffman MD     Indications:    Chelle Noyola is a 38 year old female who presents to clinic today for botox injections.  They have a history of chronic migraine headaches, more than 14 days/month.  Exam shows tenderness over the occipital and temporal muscles and they have tried conservative treatment including multiple headache medications and exercises.They report that with botox injections, the headaches are more manageable. The intensity is reduced about 70% but the duration and number of headaches has reduced significantly.    Options/alternatives, benefits and risks were discussed with the patient including bleeding, infection, pneumothorax, weakness, and headache flare.   Questions were answered and she agrees to proceed. Voluntary informed consent was obtained and signed.     Response to previous Botox treatment:   Last Botox Date: 9/25/2023 Georgina Oneal CNP   Total Unit: 155U    1. Headache frequency: 20 headache days per month. This is compared to his baseline headache frequency of same headache days per month. However the headaches are dulled with the botox and this makes them \"a lot better\"     2. Headache duration during this injection cycle: Headache duration has decreased.       3. Headache intensity during this injection cycle:    1-4/10 = Typical pain level   8/10 = Worst pain level   1/10 = Lowest pain level     4. Change in headache medication usage: ibuprofen, tylenol, baclofen - does not need these at all with the Botox injections     5. ER Visits During This Injection Cycle: NONE     6. Functional Performance: Change in ADL's, social interaction, days lost from work, etc. Patient reports being able to more fully participate in social and family activities and responsibilities as headache " symptoms have improved.    Vitals were reviewed: Yes  Allergies were reviewed:  Yes    Medications were reviewed:  Yes       Procedure:  After getting informed consent, a Pause for the Cause was performed.  Patient was prepped and draped with chloroprep.    A 27 gauge needle was used to make the injections.  After negative aspiration, botox was injected bilaterally into the following locations:    Procerus- 5 units (1 site)  Frontals- 20 units (4 sites)   -10 units (2 sites)  Temporalis- 40 units (8 sites)  Occipitis- 30 units (6 sites)  Cervical paraspinals- 20 units (4 sites).  Upper Trapezius- 30 units (6 sites)           Hemostasis was achieved.    Total units used: 155  Total units wasted: 45  Botox lot numbers and Expiration dates:  SEE MAR      Bandaids were placed when appropriate.  The patient tolerated the procedure well.    Follow-up includes:    -can be repeated after 3 full months have passed.       DARIO AWAD MD   Pain Management & Addiction Medicine

## 2023-12-20 ENCOUNTER — OFFICE VISIT (OUTPATIENT)
Dept: PALLIATIVE MEDICINE | Facility: CLINIC | Age: 38
End: 2023-12-20
Payer: COMMERCIAL

## 2023-12-20 VITALS — DIASTOLIC BLOOD PRESSURE: 99 MMHG | SYSTOLIC BLOOD PRESSURE: 148 MMHG | HEART RATE: 91 BPM | OXYGEN SATURATION: 100 %

## 2023-12-20 DIAGNOSIS — G43.009 MIGRAINE WITHOUT AURA AND WITHOUT STATUS MIGRAINOSUS, NOT INTRACTABLE: Primary | ICD-10-CM

## 2023-12-20 PROCEDURE — 64615 CHEMODENERV MUSC MIGRAINE: CPT | Performed by: ANESTHESIOLOGY

## 2023-12-20 ASSESSMENT — PAIN SCALES - GENERAL: PAINLEVEL: MODERATE PAIN (4)

## 2023-12-20 NOTE — PATIENT INSTRUCTIONS
[facility]  Botox Injection Discharge Instructions    Do not rub or put extended pressure on the injection sites. You may gently touch the sites to remove any excess blood.  Monitor the injection sites for signs and symptoms of infection such as redness, swelling, warmth, fever, chills, or drainage to areas.  You may have soreness at the injection sites for up to 24 hours.  If you are able to use anti-inflammatory medications or Tylenol for pain control, you can take these as directed.  It may take up to 1 month to notice benefit from the 1st treatment  If you do notice relief, botox can be done every 3 months.  It may require insurance authorization everytime.    For questions about insurance coverage, please call the main clinic number and ask to speak with someone about botox coverage.     Pain Clinic phone number during work hours (Monday through Friday 8 am-4:30 pm) at [phone] or the Provider Line after hours at [phone]:

## 2024-01-15 ENCOUNTER — MYC REFILL (OUTPATIENT)
Dept: FAMILY MEDICINE | Facility: CLINIC | Age: 39
End: 2024-01-15
Payer: COMMERCIAL

## 2024-01-15 DIAGNOSIS — F90.8 ADHD, ADULT RESIDUAL TYPE: ICD-10-CM

## 2024-01-15 RX ORDER — METHYLPHENIDATE HYDROCHLORIDE 36 MG/1
36 TABLET ORAL DAILY
Qty: 30 TABLET | Refills: 0 | Status: SHIPPED | OUTPATIENT
Start: 2024-01-15 | End: 2024-01-22

## 2024-01-22 ENCOUNTER — MYC REFILL (OUTPATIENT)
Dept: FAMILY MEDICINE | Facility: CLINIC | Age: 39
End: 2024-01-22
Payer: COMMERCIAL

## 2024-01-22 DIAGNOSIS — F90.8 ADHD, ADULT RESIDUAL TYPE: ICD-10-CM

## 2024-01-23 RX ORDER — METHYLPHENIDATE HYDROCHLORIDE 36 MG/1
36 TABLET ORAL DAILY
Qty: 30 TABLET | Refills: 0 | Status: SHIPPED | OUTPATIENT
Start: 2024-01-23 | End: 2024-02-01

## 2024-01-26 NOTE — TELEPHONE ENCOUNTER
Routing refill request to provider for review/approval because:  Drug not on the [facility] refill protocol     Tsering Delarosa RN on 9/10/2020 at 10:35 AM           95

## 2024-01-29 DIAGNOSIS — F33.1 MODERATE EPISODE OF RECURRENT MAJOR DEPRESSIVE DISORDER (H): ICD-10-CM

## 2024-01-29 DIAGNOSIS — F41.1 GENERALIZED ANXIETY DISORDER: ICD-10-CM

## 2024-01-30 RX ORDER — ESCITALOPRAM OXALATE 10 MG/1
10 TABLET ORAL DAILY
Qty: 90 TABLET | Refills: 0 | Status: SHIPPED | OUTPATIENT
Start: 2024-01-30

## 2024-02-01 ENCOUNTER — MYC REFILL (OUTPATIENT)
Dept: FAMILY MEDICINE | Facility: CLINIC | Age: 39
End: 2024-02-01
Payer: COMMERCIAL

## 2024-02-01 DIAGNOSIS — F90.8 ADHD, ADULT RESIDUAL TYPE: ICD-10-CM

## 2024-02-01 NOTE — TELEPHONE ENCOUNTER
See MyChart Message from patient.     Pended for updated pharmacy (due to supply issue).     Linda Cheema RN BSN  [facility]

## 2024-02-02 RX ORDER — METHYLPHENIDATE HYDROCHLORIDE 36 MG/1
36 TABLET ORAL DAILY
Qty: 30 TABLET | Refills: 0 | Status: SHIPPED | OUTPATIENT
Start: 2024-02-02

## 2024-02-18 ENCOUNTER — HEALTH MAINTENANCE LETTER (OUTPATIENT)
Age: 39
End: 2024-02-18

## 2024-03-05 ENCOUNTER — OFFICE VISIT (OUTPATIENT)
Dept: FAMILY MEDICINE | Facility: CLINIC | Age: 39
End: 2024-03-05
Payer: COMMERCIAL

## 2024-03-05 VITALS
RESPIRATION RATE: 16 BRPM | WEIGHT: 146 LBS | OXYGEN SATURATION: 100 % | TEMPERATURE: 99.1 F | DIASTOLIC BLOOD PRESSURE: 84 MMHG | HEIGHT: 66 IN | BODY MASS INDEX: 23.46 KG/M2 | SYSTOLIC BLOOD PRESSURE: 128 MMHG | HEART RATE: 84 BPM

## 2024-03-05 DIAGNOSIS — F90.8 ADHD, ADULT RESIDUAL TYPE: Primary | ICD-10-CM

## 2024-03-05 DIAGNOSIS — F33.1 MODERATE EPISODE OF RECURRENT MAJOR DEPRESSIVE DISORDER (H): ICD-10-CM

## 2024-03-05 DIAGNOSIS — F41.1 GENERALIZED ANXIETY DISORDER: ICD-10-CM

## 2024-03-05 DIAGNOSIS — I73.00 RAYNAUD'S DISEASE WITHOUT GANGRENE: ICD-10-CM

## 2024-03-05 PROCEDURE — 99214 OFFICE O/P EST MOD 30 MIN: CPT | Performed by: STUDENT IN AN ORGANIZED HEALTH CARE EDUCATION/TRAINING PROGRAM

## 2024-03-05 RX ORDER — METHYLPHENIDATE HYDROCHLORIDE 20 MG/1
20 TABLET ORAL 2 TIMES DAILY
Qty: 60 TABLET | Refills: 0 | Status: SHIPPED | OUTPATIENT
Start: 2024-03-05 | End: 2024-04-24

## 2024-03-05 RX ORDER — ESCITALOPRAM OXALATE 5 MG/1
5 TABLET ORAL DAILY
Qty: 90 TABLET | Refills: 1 | Status: SHIPPED | OUTPATIENT
Start: 2024-03-05

## 2024-03-05 ASSESSMENT — ANXIETY QUESTIONNAIRES
6. BECOMING EASILY ANNOYED OR IRRITABLE: SEVERAL DAYS
GAD7 TOTAL SCORE: 6
GAD7 TOTAL SCORE: 6
4. TROUBLE RELAXING: SEVERAL DAYS
8. IF YOU CHECKED OFF ANY PROBLEMS, HOW DIFFICULT HAVE THESE MADE IT FOR YOU TO DO YOUR WORK, TAKE CARE OF THINGS AT HOME, OR GET ALONG WITH OTHER PEOPLE?: SOMEWHAT DIFFICULT
IF YOU CHECKED OFF ANY PROBLEMS ON THIS QUESTIONNAIRE, HOW DIFFICULT HAVE THESE PROBLEMS MADE IT FOR YOU TO DO YOUR WORK, TAKE CARE OF THINGS AT HOME, OR GET ALONG WITH OTHER PEOPLE: SOMEWHAT DIFFICULT
7. FEELING AFRAID AS IF SOMETHING AWFUL MIGHT HAPPEN: NOT AT ALL
1. FEELING NERVOUS, ANXIOUS, OR ON EDGE: SEVERAL DAYS
GAD7 TOTAL SCORE: 6
5. BEING SO RESTLESS THAT IT IS HARD TO SIT STILL: SEVERAL DAYS
2. NOT BEING ABLE TO STOP OR CONTROL WORRYING: SEVERAL DAYS
7. FEELING AFRAID AS IF SOMETHING AWFUL MIGHT HAPPEN: NOT AT ALL
3. WORRYING TOO MUCH ABOUT DIFFERENT THINGS: SEVERAL DAYS

## 2024-03-05 ASSESSMENT — PAIN SCALES - GENERAL: PAINLEVEL: MODERATE PAIN (4)

## 2024-03-05 NOTE — PROGRESS NOTES
"  Assessment & Plan     ADHD, adult residual type  Raynaud's disease without gangrene  Has tried several different medications. Currently on Concerta XR but still feeling \"foggy\" with a lack of motivation. Plan to try short-acting Ritalin (was on this previously) again. Discussed close monitoring for development/worsening of Raynaud's. She is aware of the risk and is agreeable to stop medication as needed.  - Adult Mental Health  Referral  - methylphenidate (RITALIN) 20 MG tablet  Dispense: 60 tablet; Refill: 0    Moderate episode of recurrent major depressive disorder (H)  Generalized anxiety disorder  Currently taking 5mg escitalopram. Feels motivation is a little better at the lower dose. Plan to continue for now. Highly encouraged seeing a therapist. Will refer back to psychiatry for medication adjustment/optimization.  - escitalopram (LEXAPRO) 5 MG tablet  Dispense: 90 tablet; Refill: 1  - Adult Mental Health  Referral    Follow up in one month to reassess ADHD    Suman Au MD  [facility]  3/5/2024      Kumar Corbett is a 39 year old, presenting for the following health issues:  Recheck Medication        3/5/2024    10:14 AM   Additional Questions   Roomed by Fadumo LEMOS     History of Present Illness       Reason for visit:  Med check    She eats 2-3 servings of fruits and vegetables daily.She consumes 1 sweetened beverage(s) daily.She exercises with enough effort to increase her heart rate 10 to 19 minutes per day.  She exercises with enough effort to increase her heart rate 3 or less days per week.   She is taking medications regularly.    ADHD  Feels a little more foggy. Did try increasing the dose a little bit but wasn't helpful to decrease fogginess.  Was on Vyvanse in the past but caused Raynauds.   Has tried other medications including short acting medications.   Wonders if she should return to those.   Did not take Concerta this morning   Was seeing " "psychiatry in the past.     Hx:  Adderall 10mg XR in 2019 - not as helpful as Vyvanse  Vyvanse (lisdexamfetamine) 30mg - stopped due to worsening Raynaud's   Concerta (methylphenidate) ER 36mg  - still feeling foggy, less motivated    ISABEL  Decided to cut in 1/2 for anxiety. Likes better this way.  Felt like the higher dose caused her motivation to decrease.   Not currently seeing a therapist.         Objective    BP (!) 138/91 (BP Location: Right arm, Patient Position: Sitting, Cuff Size: Adult Regular)   Pulse 84   Temp 99.1  F (37.3  C) (Oral)   Resp 16   Ht 1.683 m (5' 6.25\")   Wt 66.2 kg (146 lb)   LMP 03/01/2024 (Approximate)   SpO2 100%   BMI 23.39 kg/m    Body mass index is 23.39 kg/m .    Physical Exam   GENERAL: healthy, alert and no distress  HEAD: Normocephalic, atraumatic.   EYES: Normal conjunctivae, sclera.   RESP: Normal respiratory effort.  MSK: no gross musculoskeletal defects noted.  SKIN: Left 4th digit DIP mildly TTP. No erythema, edema or warmth. Full active ROM.  NEURO: CNII-XII grossly intact. No focal deficits.  PSYCH: Groomed, dressed appropriately for weather. Mood stated as feeling \"foggy\" and lacking motivation with normal affect.     Signed Electronically by: Suman Au MD  "

## 2024-03-15 NOTE — PATIENT INSTRUCTIONS
1. Migraine without status migrainosus, not intractable, unspecified migraine type    2. Cervical myofascial strain, initial encounter      -Patient is here for follow-up of chronic cervical muscle strains  -Patient tolerated trigger point injections in bilateral trapezius musculatures without complications.  Patient was given postprocedure instructions.  -Patient will start diclofenac 75 mg twice a day as needed and Robaxin 750 mg twice a day as needed.  -Patient also reports history of migraines that were treated with Botox injections.  Patient is interested in receiving another Botox treatment.  Referral was placed with pain management to perform the procedure.  -Patient will follow-up when pain returns for reevaluation and further treatment  -Call direct clinic number [[phone]] at any time with questions or concerns.    Albert Yeo MD [facility]  [facility] [place]         No

## 2024-03-20 NOTE — PROGRESS NOTES
"Procedure note for Botox:  Pre procedure Diagnosis: Chronic Migraine     Post procedure Diagnosis: Same  Procedure performed: Botox Injections  Anesthesia: none  Complications: none  Operators: Vivian Hoffman MD     Indications:    Chelle Noyola is a 39 year old female who presents to clinic today for botox injections.  They have a history of chronic migraine headaches, more than 14 days/month.  Exam shows tenderness over the occipital and temporal muscles and they have tried conservative treatment including multiple headache medications and exercises.They report that with botox injections, the headaches are more manageable. The intensity is reduced about 70% but the duration and number of headaches has reduced significantly.    Options/alternatives, benefits and risks were discussed with the patient including bleeding, infection, pneumothorax, weakness, and headache flare.   Questions were answered and she agrees to proceed. Voluntary informed consent was obtained and signed.     Response to previous Botox treatment:   Last Botox Date: 12/20/2023   Total Unit: 155U    1. Headache frequency: 20 headache days per month. This is compared to his baseline headache frequency of same headache days per month. However the headaches are dulled with the botox and this makes them \"a lot better\"     2. Headache duration during this injection cycle: Headache duration has decreased.       3. Headache intensity during this injection cycle:    1-4/10 = Typical pain level   8/10 = Worst pain level   1/10 = Lowest pain level     4. Change in headache medication usage: ibuprofen, tylenol, baclofen - does not need these at all with the Botox injections     5. ER Visits During This Injection Cycle: NONE     6. Functional Performance: Change in ADL's, social interaction, days lost from work, etc. Patient reports being able to more fully participate in social and family activities and responsibilities as headache symptoms have " improved.    Vitals were reviewed: Yes  Allergies were reviewed:  Yes    Medications were reviewed:  Yes       Procedure:  After getting informed consent, a Pause for the Cause was performed.  Patient was prepped and draped with chloroprep.    A 27 gauge needle was used to make the injections.  After negative aspiration, botox was injected bilaterally into the following locations:    Procerus- 5 units (1 site)  Frontals- 20 units (4 sites)   -10 units (2 sites)  Temporalis- 40 units (8 sites)  Occipitis- 30 units (6 sites)  Cervical paraspinals- 20 units (4 sites).  Upper Trapezius- 30 units (6 sites)           Hemostasis was achieved.    Total units used: 155  Total units wasted: 45  Botox lot numbers and Expiration dates:  SEE MAR      Bandaids were placed when appropriate.  The patient tolerated the procedure well.    Follow-up includes:    -can be repeated after 3 full months have passed. JUNE 20nd, 2024 @ 1015      DARIO AWAD MD   Pain Management & Addiction Medicine

## 2024-03-21 ENCOUNTER — OFFICE VISIT (OUTPATIENT)
Dept: PALLIATIVE MEDICINE | Facility: CLINIC | Age: 39
End: 2024-03-21
Payer: COMMERCIAL

## 2024-03-21 VITALS — SYSTOLIC BLOOD PRESSURE: 149 MMHG | HEART RATE: 97 BPM | OXYGEN SATURATION: 100 % | DIASTOLIC BLOOD PRESSURE: 94 MMHG

## 2024-03-21 DIAGNOSIS — G43.009 MIGRAINE WITHOUT AURA AND WITHOUT STATUS MIGRAINOSUS, NOT INTRACTABLE: Primary | ICD-10-CM

## 2024-03-21 PROCEDURE — 64615 CHEMODENERV MUSC MIGRAINE: CPT | Performed by: ANESTHESIOLOGY

## 2024-03-21 ASSESSMENT — PAIN SCALES - PAIN ENJOYMENT GENERAL ACTIVITY SCALE (PEG)
AVG_PAIN_PASTWEEK: 4
INTERFERED_GENERAL_ACTIVITY: 4
INTERFERED_ENJOYMENT_LIFE: 4
PEG_TOTALSCORE: 4

## 2024-03-21 ASSESSMENT — PAIN SCALES - GENERAL: PAINLEVEL: MODERATE PAIN (4)

## 2024-03-21 NOTE — PATIENT INSTRUCTIONS
[facility]  Botox Injection Discharge Instructions    Do not rub or put extended pressure on the injection sites. You may gently touch the sites to remove any excess blood.  Monitor the injection sites for signs and symptoms of infection such as redness, swelling, warmth, fever, chills, or drainage to areas.  You may have soreness at the injection sites for up to 24 hours.  If you are able to use anti-inflammatory medications or Tylenol for pain control, you can take these as directed.  It may take up to 1 month to notice benefit from the 1st treatment  If you do notice relief, botox can be done every 3 months.  It may require insurance authorization everytime.    For questions about insurance coverage, please call the main clinic number and ask to speak with someone about botox coverage.     Pain Clinic phone number during work hours (Monday through Friday 8 am-4:30 pm) at [phone] or the Provider Line after hours at [phone]:

## 2024-04-24 ENCOUNTER — MYC REFILL (OUTPATIENT)
Dept: FAMILY MEDICINE | Facility: CLINIC | Age: 39
End: 2024-04-24
Payer: COMMERCIAL

## 2024-04-24 DIAGNOSIS — F90.8 ADHD, ADULT RESIDUAL TYPE: ICD-10-CM

## 2024-04-25 RX ORDER — METHYLPHENIDATE HYDROCHLORIDE 20 MG/1
20 TABLET ORAL 2 TIMES DAILY
Qty: 60 TABLET | Refills: 0 | Status: SHIPPED | OUTPATIENT
Start: 2024-04-25

## 2024-06-19 NOTE — PROGRESS NOTES
Procedure note for Botox:  Pre procedure Diagnosis: Chronic Migraine     Post procedure Diagnosis: Same  Procedure performed: Botox Injections  Anesthesia: none  Complications: none  Operators: Vivian Awad MD    Indications:    Chelle Noyola is a 39 year old female who presents to clinic today for botox injections.  They have a history of chronic migraine headaches, more than 14 days/month.  Exam shows tenderness over the occipital and temporal muscles and they have tried other conservative treatments.      Options/alternatives, benefits and risks were discussed with the patient including bleeding, infection, pneumothorax, weakness, and headache flare.   Questions were answered and she agrees to proceed. Voluntary informed consent was obtained and signed.     Last Botox Date: 3/21/2024. The intensity is reduced about 70% but the duration and number of headaches has reduced significantly.     Vitals were reviewed: Yes  Allergies were reviewed:  Yes    Medications were reviewed:  Yes     Procedure:  After getting informed consent, a Pause for the Cause was performed.  Patient was prepped and draped with chloroprep.    A 27 gauge needle was used to make the injections.  After negative aspiration, botox was injected bilaterally into the following locations:    Procerus- 5 units (1 site)  Frontals- 20 units (4 sites)   -10 units (2 sites)  Temporalis- 40 units (8 sites)  Occipitis- 30 units (6 sites)  Cervical paraspinals- 20 units (4 sites).  Upper Trapezius- 30 units (6 sites)           Hemostasis was achieved.    Total units used: 155  Total units wasted: 45  Botox lot numbers and Expiration dates:  SEE MAR      Bandaids were placed when appropriate.  The patient tolerated the procedure well.    Follow-up includes:    -f/u phone call in one week  -can be repeated after 3 full months have passed.      VIVIAN AWAD MD   Pain Management

## 2024-06-20 ENCOUNTER — OFFICE VISIT (OUTPATIENT)
Dept: PALLIATIVE MEDICINE | Facility: CLINIC | Age: 39
End: 2024-06-20
Payer: COMMERCIAL

## 2024-06-20 VITALS — SYSTOLIC BLOOD PRESSURE: 135 MMHG | HEART RATE: 86 BPM | DIASTOLIC BLOOD PRESSURE: 84 MMHG | OXYGEN SATURATION: 100 %

## 2024-06-20 DIAGNOSIS — G43.009 MIGRAINE WITHOUT AURA AND WITHOUT STATUS MIGRAINOSUS, NOT INTRACTABLE: Primary | ICD-10-CM

## 2024-06-20 PROCEDURE — 64615 CHEMODENERV MUSC MIGRAINE: CPT | Performed by: ANESTHESIOLOGY

## 2024-06-20 ASSESSMENT — PAIN SCALES - PAIN ENJOYMENT GENERAL ACTIVITY SCALE (PEG)
AVG_PAIN_PASTWEEK: 4
INTERFERED_ENJOYMENT_LIFE: 3
INTERFERED_GENERAL_ACTIVITY: 3
PEG_TOTALSCORE: 3.33
AVG_PAIN_PASTWEEK: 4
INTERFERED_ENJOYMENT_LIFE: 3
PEG_TOTALSCORE: 3.33
INTERFERED_GENERAL_ACTIVITY: 3

## 2024-06-20 ASSESSMENT — PAIN SCALES - GENERAL: PAINLEVEL: MODERATE PAIN (4)

## 2024-06-20 NOTE — PATIENT INSTRUCTIONS
[facility]  Botox Injection Discharge Instructions    Do not rub or put extended pressure on the injection sites. You may gently touch the sites to remove any excess blood.  Monitor the injection sites for signs and symptoms of infection such as redness, swelling, warmth, fever, chills, or drainage to areas.  You may have soreness at the injection sites for up to 24 hours.  If you are able to use anti-inflammatory medications or Tylenol for pain control, you can take these as directed.  It may take up to 1 month to notice benefit from the 1st treatment  If you do notice relief, botox can be done every 3 months.  It may require insurance authorization everytime.    For questions about insurance coverage, please call the main clinic number and ask to speak with someone about botox coverage.     Pain Clinic phone number during work hours (Monday through Friday 8 am-4:30 pm) at [phone] or the Provider Line after hours at [phone]:

## 2024-08-19 ENCOUNTER — PATIENT OUTREACH (OUTPATIENT)
Dept: CARE COORDINATION | Facility: CLINIC | Age: 39
End: 2024-08-19
Payer: COMMERCIAL

## 2024-09-02 ENCOUNTER — PATIENT OUTREACH (OUTPATIENT)
Dept: CARE COORDINATION | Facility: CLINIC | Age: 39
End: 2024-09-02
Payer: COMMERCIAL

## 2024-10-01 ENCOUNTER — OFFICE VISIT (OUTPATIENT)
Dept: PALLIATIVE MEDICINE | Facility: CLINIC | Age: 39
End: 2024-10-01
Payer: COMMERCIAL

## 2024-10-01 VITALS — SYSTOLIC BLOOD PRESSURE: 142 MMHG | OXYGEN SATURATION: 98 % | HEART RATE: 84 BPM | DIASTOLIC BLOOD PRESSURE: 95 MMHG

## 2024-10-01 DIAGNOSIS — G43.009 MIGRAINE WITHOUT AURA AND WITHOUT STATUS MIGRAINOSUS, NOT INTRACTABLE: Primary | ICD-10-CM

## 2024-10-01 PROCEDURE — 64615 CHEMODENERV MUSC MIGRAINE: CPT | Performed by: ANESTHESIOLOGY

## 2024-10-01 ASSESSMENT — PAIN SCALES - PAIN ENJOYMENT GENERAL ACTIVITY SCALE (PEG)
AVG_PAIN_PASTWEEK: 3
INTERFERED_ENJOYMENT_LIFE: 3
PEG_TOTALSCORE: 2.67
INTERFERED_GENERAL_ACTIVITY: 2

## 2024-10-01 ASSESSMENT — PAIN SCALES - GENERAL: PAINLEVEL: MILD PAIN (3)

## 2024-10-01 NOTE — PROGRESS NOTES
Procedure note for Botox:  Pre procedure Diagnosis: Chronic Migraine     Post procedure Diagnosis: Same  Procedure performed: Botox Injections  Anesthesia: none  Complications: none  Operators: Vivian Awad MD    Indications:    Chelle Noyola is a 39 year old female who presents to clinic today for botox injections.  They have a history of chronic migraine headaches, more than 14 days/month.  Exam shows tenderness over the occipital and temporal muscles and they have tried other conservative treatments.      Options/alternatives, benefits and risks were discussed with the patient including bleeding, infection, pneumothorax, weakness, and headache flare.   Questions were answered and she agrees to proceed. Voluntary informed consent was obtained and signed.     Last Botox Date: 6/20/2024. The intensity is reduced about 80% and the duration and number of headaches has reduced significantly.     Vitals were reviewed: Yes  Allergies were reviewed:  Yes    Medications were reviewed:  Yes     Procedure:  After getting informed consent, a Pause for the Cause was performed.  Patient was prepped and draped with chloroprep.    A 27 gauge needle was used to make the injections.  After negative aspiration, botox was injected bilaterally into the following locations:    Procerus- 5 units (1 site)  Frontals- 20 units (4 sites)   -10 units (2 sites)  Temporalis- 40 units (8 sites)  Occipitis- 30 units (6 sites)  Cervical paraspinals- 20 units (4 sites).  Upper Trapezius- 30 units (6 sites)           Hemostasis was achieved.    Total units used: 155  Total units wasted: 45  Botox lot numbers and Expiration dates:  SEE MAR      Bandaids were placed when appropriate.  The patient tolerated the procedure well.    Follow-up includes:    -f/u phone call in one week  -can be repeated after 3 full months have passed.      VIVIAN AWAD MD   Pain Management

## 2024-10-01 NOTE — PATIENT INSTRUCTIONS
[facility]  Botox Injection Discharge Instructions    Do not rub or put extended pressure on the injection sites. You may gently touch the sites to remove any excess blood.  Monitor the injection sites for signs and symptoms of infection such as redness, swelling, warmth, fever, chills, or drainage to areas.  You may have soreness at the injection sites for up to 24 hours.  If you are able to use anti-inflammatory medications or Tylenol for pain control, you can take these as directed.  It may take up to 1 month to notice benefit from the 1st treatment  If you do notice relief, botox can be done every 3 months.  It may require insurance authorization everytime.    For questions about insurance coverage, please call the main clinic number and ask to speak with someone about botox coverage.     Pain Clinic phone number during work hours (Monday through Friday 8 am-4:30 pm) at [phone] or the Provider Line after hours at [phone]:

## 2024-11-24 ENCOUNTER — HEALTH MAINTENANCE LETTER (OUTPATIENT)
Age: 39
End: 2024-11-24

## 2024-12-23 ENCOUNTER — PATIENT OUTREACH (OUTPATIENT)
Dept: CARE COORDINATION | Facility: CLINIC | Age: 39
End: 2024-12-23
Payer: COMMERCIAL

## 2024-12-31 NOTE — PROGRESS NOTES
Procedure note for Botox:  Pre procedure Diagnosis: Chronic Migraine     Post procedure Diagnosis: Same  Procedure performed: Botox Injections  Anesthesia: none  Complications: none  Operators: Vivian Awad MD    Indications:    Chelle Noyola is a 39 year old female who presents to clinic today for botox injections.  They have a history of chronic migraine headaches, more than 14 days/month.  Exam shows tenderness over the occipital and temporal muscles and they have tried other conservative treatments.      Options/alternatives, benefits and risks were discussed with the patient including bleeding, infection, pneumothorax, weakness, and headache flare.   Questions were answered and she agrees to proceed. Voluntary informed consent was obtained and signed.     Last Botox Date: 10/1/2024. The intensity is reduced about 80% and the duration and number of headaches has reduced significantly.     Vitals were reviewed: Yes  Allergies were reviewed:  Yes    Medications were reviewed:  Yes     Procedure:  After getting informed consent, a Pause for the Cause was performed.  Patient was prepped and draped with chloroprep.    A 27 gauge needle was used to make the injections.  After negative aspiration, botox was injected bilaterally into the following locations:    Procerus- 5 units (1 site)  Frontals- 20 units (4 sites)   -10 units (2 sites)  Temporalis- 40 units (8 sites)  Occipitis- 30 units (6 sites)  Cervical paraspinals- 20 units (4 sites).  Upper Trapezius- 30 units (6 sites)           Hemostasis was achieved.    Total units used: 155  Total units wasted: 45  Botox lot numbers and Expiration dates:  SEE MAR      Bandaids were placed when appropriate.  The patient tolerated the procedure well.    Follow-up includes:    -f/u phone call in one week  -can be repeated after 3 full months have passed.      VIVIAN AWAD MD   Pain Management

## 2025-01-02 ENCOUNTER — OFFICE VISIT (OUTPATIENT)
Dept: PALLIATIVE MEDICINE | Facility: CLINIC | Age: 40
End: 2025-01-02
Payer: COMMERCIAL

## 2025-01-02 VITALS — SYSTOLIC BLOOD PRESSURE: 153 MMHG | DIASTOLIC BLOOD PRESSURE: 97 MMHG | HEART RATE: 73 BPM | OXYGEN SATURATION: 100 %

## 2025-01-02 DIAGNOSIS — G43.009 MIGRAINE WITHOUT AURA AND WITHOUT STATUS MIGRAINOSUS, NOT INTRACTABLE: Primary | ICD-10-CM

## 2025-01-02 ASSESSMENT — PAIN SCALES - PAIN ENJOYMENT GENERAL ACTIVITY SCALE (PEG)
INTERFERED_GENERAL_ACTIVITY: 0 - DOES NOT INTERFERE
PEG_TOTALSCORE: 1.33
INTERFERED_ENJOYMENT_LIFE: 0
INTERFERED_GENERAL_ACTIVITY: 0
AVG_PAIN_PASTWEEK: 4
AVG_PAIN_PASTWEEK: 4
PEG_TOTALSCORE: 1.33
INTERFERED_ENJOYMENT_LIFE: 0 - DOES NOT INTERFERE

## 2025-01-02 NOTE — PATIENT INSTRUCTIONS
[facility]  Botox Injection Discharge Instructions    Do not rub or put extended pressure on the injection sites. You may gently touch the sites to remove any excess blood.  Monitor the injection sites for signs and symptoms of infection such as redness, swelling, warmth, fever, chills, or drainage to areas.  You may have soreness at the injection sites for up to 24 hours.  If you are able to use anti-inflammatory medications or Tylenol for pain control, you can take these as directed.  It may take up to 1 month to notice benefit from the 1st treatment  If you do notice relief, botox can be done every 3 months.  It may require insurance authorization everytime.    For questions about insurance coverage, please call the main clinic number and ask to speak with someone about botox coverage.     Pain Clinic phone number during work hours (Monday through Friday 8 am-4:30 pm) at [phone] or the Provider Line after hours at [phone]:

## 2025-03-17 ENCOUNTER — PATIENT OUTREACH (OUTPATIENT)
Dept: CARE COORDINATION | Facility: CLINIC | Age: 40
End: 2025-03-17
Payer: COMMERCIAL

## 2025-04-02 NOTE — PROGRESS NOTES
Procedure note for Botox:  Pre procedure Diagnosis: Chronic Migraine     Post procedure Diagnosis: Same  Procedure performed: Botox Injections  Anesthesia: none  Complications: none  Operators: Vivian Awad MD    Indications:    Chelle Noyola is a 40 year old female who presents to clinic today for botox injections.  They have a history of chronic migraine headaches, more than 14 days/month.  Exam shows tenderness over the occipital and temporal muscles and they have tried other conservative treatments.      Options/alternatives, benefits and risks were discussed with the patient including bleeding, infection, pneumothorax, weakness, and headache flare.   Questions were answered and she agrees to proceed. Voluntary informed consent was obtained and signed.     Last Botox Date: 1/2/2025. The intensity is reduced about 80% and the duration and number of headaches has reduced significantly.     Vitals were reviewed: Yes  Allergies were reviewed:  Yes    Medications were reviewed:  Yes     Procedure:  After getting informed consent, a Pause for the Cause was performed.  Patient was prepped and draped with chloroprep.    A 27 gauge needle was used to make the injections.  After negative aspiration, botox was injected bilaterally into the following locations:    Procerus- 5 units (1 site)  Frontals- 20 units (4 sites)   -10 units (2 sites)  Temporalis- 40 units (8 sites)  Occipitis- 30 units (6 sites)  Cervical paraspinals- 20 units (4 sites).  Upper Trapezius- 30 units (6 sites)           Hemostasis was achieved.    Total units used: 155  Total units wasted: 45  Botox lot numbers and Expiration dates:  SEE MAR      Bandaids were placed when appropriate.  The patient tolerated the procedure well.    Follow-up includes:    -f/u phone call in one week  -can be repeated after 3 full months have passed.      VIVIAN AWAD MD   Pain Management

## 2025-04-03 ENCOUNTER — OFFICE VISIT (OUTPATIENT)
Dept: PALLIATIVE MEDICINE | Facility: CLINIC | Age: 40
End: 2025-04-03
Payer: COMMERCIAL

## 2025-04-03 VITALS — DIASTOLIC BLOOD PRESSURE: 95 MMHG | SYSTOLIC BLOOD PRESSURE: 145 MMHG | HEART RATE: 70 BPM | OXYGEN SATURATION: 100 %

## 2025-04-03 DIAGNOSIS — G43.009 MIGRAINE WITHOUT AURA AND WITHOUT STATUS MIGRAINOSUS, NOT INTRACTABLE: Primary | ICD-10-CM

## 2025-04-03 ASSESSMENT — PAIN SCALES - PAIN ENJOYMENT GENERAL ACTIVITY SCALE (PEG)
INTERFERED_GENERAL_ACTIVITY: 4
AVG_PAIN_PASTWEEK: 4
INTERFERED_ENJOYMENT_LIFE: 4
PEG_TOTALSCORE: 4

## 2025-04-03 ASSESSMENT — PAIN SCALES - GENERAL: PAINLEVEL_OUTOF10: MODERATE PAIN (4)

## 2025-04-28 ENCOUNTER — VIRTUAL VISIT (OUTPATIENT)
Dept: FAMILY MEDICINE | Facility: CLINIC | Age: 40
End: 2025-04-28
Payer: COMMERCIAL

## 2025-04-28 DIAGNOSIS — F90.8 ADHD, ADULT RESIDUAL TYPE: Primary | ICD-10-CM

## 2025-04-28 DIAGNOSIS — I73.00 RAYNAUD'S DISEASE WITHOUT GANGRENE: ICD-10-CM

## 2025-04-28 DIAGNOSIS — R03.0 ELEVATED BLOOD PRESSURE READING WITHOUT DIAGNOSIS OF HYPERTENSION: ICD-10-CM

## 2025-04-28 DIAGNOSIS — F33.1 MODERATE EPISODE OF RECURRENT MAJOR DEPRESSIVE DISORDER (H): ICD-10-CM

## 2025-04-28 PROCEDURE — 98006 SYNCH AUDIO-VIDEO EST MOD 30: CPT | Performed by: STUDENT IN AN ORGANIZED HEALTH CARE EDUCATION/TRAINING PROGRAM

## 2025-04-28 RX ORDER — DEXTROAMPHETAMINE SACCHARATE, AMPHETAMINE ASPARTATE, DEXTROAMPHETAMINE SULFATE AND AMPHETAMINE SULFATE 2.5; 2.5; 2.5; 2.5 MG/1; MG/1; MG/1; MG/1
10 TABLET ORAL 2 TIMES DAILY
Qty: 60 TABLET | Refills: 0 | Status: SHIPPED | OUTPATIENT
Start: 2025-04-28 | End: 2025-05-28

## 2025-04-28 ASSESSMENT — ANXIETY QUESTIONNAIRES
7. FEELING AFRAID AS IF SOMETHING AWFUL MIGHT HAPPEN: SEVERAL DAYS
3. WORRYING TOO MUCH ABOUT DIFFERENT THINGS: SEVERAL DAYS
7. FEELING AFRAID AS IF SOMETHING AWFUL MIGHT HAPPEN: SEVERAL DAYS
GAD7 TOTAL SCORE: 7
8. IF YOU CHECKED OFF ANY PROBLEMS, HOW DIFFICULT HAVE THESE MADE IT FOR YOU TO DO YOUR WORK, TAKE CARE OF THINGS AT HOME, OR GET ALONG WITH OTHER PEOPLE?: SOMEWHAT DIFFICULT
GAD7 TOTAL SCORE: 7
2. NOT BEING ABLE TO STOP OR CONTROL WORRYING: SEVERAL DAYS
IF YOU CHECKED OFF ANY PROBLEMS ON THIS QUESTIONNAIRE, HOW DIFFICULT HAVE THESE PROBLEMS MADE IT FOR YOU TO DO YOUR WORK, TAKE CARE OF THINGS AT HOME, OR GET ALONG WITH OTHER PEOPLE: SOMEWHAT DIFFICULT
6. BECOMING EASILY ANNOYED OR IRRITABLE: SEVERAL DAYS
4. TROUBLE RELAXING: SEVERAL DAYS
1. FEELING NERVOUS, ANXIOUS, OR ON EDGE: SEVERAL DAYS
5. BEING SO RESTLESS THAT IT IS HARD TO SIT STILL: SEVERAL DAYS
GAD7 TOTAL SCORE: 7

## 2025-04-28 NOTE — PROGRESS NOTES
Chelle is a 40 year old who is being evaluated via a billable video visit.    How would you like to obtain your AVS? MyChart  If the video visit is dropped, the invitation should be resent by: Text to cell phone: [phone]  Will anyone else be joining your video visit? No    Assessment & Plan     ADHD, adult residual type  Raynaud's disease without gangrene  Trial of both prior long acting stimulants (Vyvanse and Concerta) leading to insomnia. She prefers to stick with immediate release. Last medication was Ritalin BID but not quite sure if this was totally effective. Plan to trial Adderall IR. If she develops Raynaud's, discussed [place] [employer] message and I will restart Ritalin BID which did not cause this issue.  - amphetamine-dextroamphetamine (ADDERALL) 10 MG tablet  Dispense: 60 tablet; Refill: 0    Elevated blood pressure reading without diagnosis of hypertension  Per chart review, especially during recent visits with pain management for botox injections. Unclear if developing HTN or if continued white coat HTN without diagnosis of HTN although has historically needed second BP check to return back to normal range. Plan to cautiously restart ADHD medication as above. Discussed repeating BP in one month at follow up.     Moderate episode of recurrent major depressive disorder (H)  Stable PHQ but >5. Not currently on pharmacotherapy. Plan to revisit in one month.    Nicotine/Tobacco Cessation  She reports that she has been smoking cigarettes. She has never used smokeless tobacco.    Follow up in one month for in person ADHD follow up    Suman Au MD  [facility]  4/28/2025    Kumar Corbett is a 40 year old, presenting for the following health issues:  Consult        4/28/2025     8:50 AM   Additional Questions   Roomed by MR   Accompanied by NA         4/28/2025     8:50 AM   Patient Reported Additional Medications   Patient reports taking the following new medications NA     History of  Present Illness       Reason for visit:  Get back in Adhd meds    She eats 2-3 servings of fruits and vegetables daily.She consumes 1 sweetened beverage(s) daily.She exercises with enough effort to increase her heart rate 10 to 19 minutes per day.  She exercises with enough effort to increase her heart rate 3 or less days per week.   She is taking medications regularly.          10/23/2023    11:09 AM 3/5/2024    10:13 AM 4/28/2025     8:38 AM   PHQ   PHQ-9 Total Score 7 7 9    Q9: Thoughts of better off dead/self-harm past 2 weeks Not at all Not at all Not at all       Patient-reported          10/23/2023    11:10 AM 3/5/2024    10:13 AM 4/28/2025     8:39 AM   ISABEL-7 SCORE   Total Score 7 (mild anxiety) 6 (mild anxiety) 7 (mild anxiety)   Total Score 7 6 7        Patient-reported     ADHD  Has been off medications for about one year or so.   Just on and off trying to make the prescription lingering as long as possible.    Ritalin has worked fine. Felt like the ritalin was helpful but not quite sure if it was great for her.       Would like to try the Adderall instead.   Has had issues with long acting stimulants in the past due to insomnia with both Concerta and Vyvanse.   Doesn't always want the effects for the entire day.     Elevated blood pressure  Doesn't have a blood pressure cuff at home.        Objective         Vitals:  No vitals were obtained today due to virtual visit.    Physical Exam   GENERAL: alert and no distress  EYES: Eyes grossly normal to inspection.  No discharge or erythema, or obvious scleral/conjunctival abnormalities.  RESP: No audible wheeze, cough, or visible cyanosis.    SKIN: Visible skin clear. No significant rash, abnormal pigmentation or lesions.  NEURO: Cranial nerves grossly intact.  Mentation and speech appropriate for age.  PSYCH: Appropriate affect, tone, and pace of words  Video-Visit Details    Type of service:  Video Visit   Originating Location (pt. Location): Home  Distant  Location (provider location):  On-site  Platform used for Video Visit: Brigida  Signed Electronically by: Suman Au MD

## 2025-04-30 NOTE — TELEPHONE ENCOUNTER
Appointment scheduled.    methylphenidate (RITALIN) 20 MG tablet       Last Written Prescription Date:  2/1/2021  Last Fill Quantity: 24,   # refills: 0  Last Office Visit: 10/19/2020  (VIRTUAL VISIT)  Future Office visit:       Routing refill request to provider for review/approval because:  Drug not on the [facility] or [facility] refill protocol or controlled substance.    Katelyn Ryan RN

## 2025-06-02 ENCOUNTER — ANCILLARY PROCEDURE (OUTPATIENT)
Dept: GENERAL RADIOLOGY | Facility: CLINIC | Age: 40
End: 2025-06-02
Attending: STUDENT IN AN ORGANIZED HEALTH CARE EDUCATION/TRAINING PROGRAM
Payer: COMMERCIAL

## 2025-06-02 ENCOUNTER — OFFICE VISIT (OUTPATIENT)
Dept: FAMILY MEDICINE | Facility: CLINIC | Age: 40
End: 2025-06-02
Payer: COMMERCIAL

## 2025-06-02 VITALS
HEIGHT: 66 IN | BODY MASS INDEX: 22.18 KG/M2 | RESPIRATION RATE: 14 BRPM | OXYGEN SATURATION: 100 % | SYSTOLIC BLOOD PRESSURE: 134 MMHG | DIASTOLIC BLOOD PRESSURE: 89 MMHG | WEIGHT: 138 LBS | TEMPERATURE: 97.9 F | HEART RATE: 88 BPM

## 2025-06-02 DIAGNOSIS — F90.8 ADHD, ADULT RESIDUAL TYPE: Primary | ICD-10-CM

## 2025-06-02 DIAGNOSIS — S80.211A ABRASION, KNEE, RIGHT, INITIAL ENCOUNTER: ICD-10-CM

## 2025-06-02 DIAGNOSIS — M79.671 RIGHT FOOT PAIN: ICD-10-CM

## 2025-06-02 DIAGNOSIS — R03.0 ELEVATED BLOOD PRESSURE READING WITHOUT DIAGNOSIS OF HYPERTENSION: ICD-10-CM
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
## 2025-06-02 DIAGNOSIS — S80.01XA CONTUSION OF RIGHT KNEE, INITIAL ENCOUNTER: ICD-10-CM

## 2025-06-02 PROCEDURE — 1125F AMNT PAIN NOTED PAIN PRSNT: CPT | Performed by: STUDENT IN AN ORGANIZED HEALTH CARE EDUCATION/TRAINING PROGRAM

## 2025-06-02 PROCEDURE — 3075F SYST BP GE 130 - 139MM HG: CPT | Performed by: STUDENT IN AN ORGANIZED HEALTH CARE EDUCATION/TRAINING PROGRAM

## 2025-06-02 PROCEDURE — 99214 OFFICE O/P EST MOD 30 MIN: CPT | Performed by: STUDENT IN AN ORGANIZED HEALTH CARE EDUCATION/TRAINING PROGRAM

## 2025-06-02 PROCEDURE — 3079F DIAST BP 80-89 MM HG: CPT | Performed by: STUDENT IN AN ORGANIZED HEALTH CARE EDUCATION/TRAINING PROGRAM

## 2025-06-02 PROCEDURE — 73630 X-RAY EXAM OF FOOT: CPT | Mod: TC | Performed by: RADIOLOGY

## 2025-06-02 RX ORDER — DEXTROAMPHETAMINE SACCHARATE, AMPHETAMINE ASPARTATE, DEXTROAMPHETAMINE SULFATE AND AMPHETAMINE SULFATE 2.5; 2.5; 2.5; 2.5 MG/1; MG/1; MG/1; MG/1
10 TABLET ORAL 2 TIMES DAILY
Qty: 60 TABLET | Refills: 0 | Status: SHIPPED | OUTPATIENT
Start: 2025-06-02

## 2025-06-02 ASSESSMENT — PAIN SCALES - GENERAL: PAINLEVEL_OUTOF10: SEVERE PAIN (7)

## 2025-06-02 NOTE — PROGRESS NOTES
Assessment & Plan     ADHD, adult residual type  Doing well. Tolerating well. Ok to refill for 6 months.   - amphetamine-dextroamphetamine (ADDERALL) 10 MG tablet  Dispense: 60 tablet; Refill: 0    Elevated blood pressure reading without diagnosis of hypertension  BP elevated today in clinic. Reviewed importance of good BP control including long-term effects, especially while on stimulant medication. She endorses high salt diet. Plan for her to obtain at home BP cuff and take for 3 days for better measurement, MyChart results with indication of stimulant use on days of BP check. Discussed reducing salt intake.     Fall down stairs, initial encounter  Fell over R flipflop down two steps, landing on R foot and then R knee. Did not hit head, no LOC. See below for plan:    Abrasion, knee, R, initial encounter  Contusion of right knee, initial encounter  Discussed ice, NSAIDs. Keep abrasion clean, moist, covered until healed. Discussed use of sunscreen afterwards.     Right foot pain  Landed on R foot during fall as above. XR to rule out fracture. Otherwise discussed ice, NSAIDs, activity modification.   - XR Foot Right G/E 3 Views    Follow up in 6 months for ADHD follow up. Earlier as needed    Suman Au MD  [facility]  6/2/2025    Kumar Corbett is a 40 year old, presenting for the following health issues:  A.D.H.D        6/2/2025    11:05 AM   Additional Questions   Roomed by Fadumo Urena VF       Hurt R foot and knee yesterday 6/1 after falling on stairs. If time permits, would like examined to make sure nothing is broken.    A.D.H.D    History of Present Illness       Reason for visit:  Medication follow up    She eats 2-3 servings of fruits and vegetables daily.She consumes 0 sweetened beverage(s) daily.She exercises with enough effort to increase her heart rate 20 to 29 minutes per day.  She exercises with enough effort to increase her heart rate 3 or less days per week.   She is taking  "medications regularly.        Medication Followup of Adderall 20mg  Taking Medication as prescribed: yes  Side Effects:  None  Medication Helping Symptoms:  yes    Doing well on 10mg Adderall BID. No issues at all.   No symptoms of Raynauds with this medication.  Doesn't check blood pressure at home.     Recent injury:  Tripped over flipflops on 2 step stairs yesterday. Fell onto her R foot and then onto her knee on the concrete.     Maybe hyperextended R foot.   Hx of old injury in foot the past.   Walking just slowly now. Took some ibuprofen which was really helpful.   Was able to get up and walk afterwards.     Knee  Able to fully flex.   No sensation of giving out. No catching or locking at all.   Pain over the front of the tibia.     R foot  Pain in big toe and around the top of the MCPs.    A little bit of numbness in her big toe.  Wasn't able to move the big toe as much yesterday.  Feels she can move it better now after ibuprofen.        Objective    /89 (BP Location: Right arm, Patient Position: Sitting, Cuff Size: Adult Regular)   Pulse 88   Temp 97.9  F (36.6  C) (Oral)   Resp 14   Ht 1.683 m (5' 6.25\")   Wt 62.6 kg (138 lb)   LMP 05/05/2025 (Approximate)   SpO2 100%   BMI 22.11 kg/m    Body mass index is 22.11 kg/m .    Physical Exam   GENERAL: healthy, alert and no distress  HEAD: Normocephalic, atraumatic.   EYES: Normal conjunctivae, sclera.   RESP: lungs clear to auscultation - no rales, rhonchi or wheezes  CV: regular rate and rhythm, normal S1 S2, no murmur, click, rub or gallop.   MSK:   RIGHT KNEE: 3cm abrasion just lateral to tibial plateau with mild edema and warmth. No purulence. No knee effusion. No laxity to varus/valgus stress. No TTP over medial/lateral joint lines. Normal anterior/posterior drawer. Full active ROM.   RIGHT ANKLE: No effusion. Full active ROM without pain. No TTP over posterior aspect of medial/lateral malleolus. No TTP over base of 5th metatarsal nor " navicular bone. No TTP over ATFL, CFL, PTFL.   RIGHT FOOT: Normal pulses bilaterally. Few small abrasions over dorsal aspect of 2nd/3rd/4th MCP. Mild ecchymosis over R big toe. Diminished sensation to light touch over 1st toe circumferentially. Toe dorsiflexion/plantar flexion intact throughout. TTP over dorsal 1st, 2nd, 3rd MTP.   SKIN: no suspicious lesions or rashes.  EXT: Warm and well perfused. DP pulses 2+ bilaterally.  NEURO: CNII-XII grossly intact. No focal deficits.  PSYCH: Groomed, dressed appropriately for weather.  Logical, linear thought process. Normal mood with consistent affect.     Signed Electronically by: Suman Au MD

## 2025-06-02 NOTE — PATIENT INSTRUCTIONS
How to Check Your Blood Pressure at Home    Make sure your blood pressure cuff is validated (produces accurate readings). Go to validateBP.org for a list of blood pressure cuffs.    Take your blood pressure 2 times right after you wake up (5 minutes after resting and 5 minutes between readings) and 2 times right before going to bed for a total of 4 readings in one day.  Make sure the readings are before you take your medications, smoke and after you empty your bladder.   Sit with good back support and feet flat on the floor. Have your blood pressure cuff resting at your heart level.    Write these numbers down for the next 3 days and MyChart me with the results.

## 2025-06-04 ENCOUNTER — RESULTS FOLLOW-UP (OUTPATIENT)
Dept: FAMILY MEDICINE | Facility: CLINIC | Age: 40
End: 2025-06-04

## 2025-06-09 ENCOUNTER — TRANSFERRED RECORDS (OUTPATIENT)
Dept: HEALTH INFORMATION MANAGEMENT | Facility: CLINIC | Age: 40
End: 2025-06-09
Payer: COMMERCIAL

## 2025-07-02 NOTE — PROGRESS NOTES
Procedure note for Botox:  Pre procedure Diagnosis: Chronic Migraine     Post procedure Diagnosis: Same  Procedure performed: Botox Injections  Anesthesia: none  Complications: none  Operators: Vivian Awad MD    Indications:    Chelle Noyola is a 40 year old female who presents to clinic today for botox injections.  They have a history of chronic migraine headaches, more than 14 days/month.  Exam shows tenderness over the occipital and temporal muscles and they have tried other conservative treatments.      Options/alternatives, benefits and risks were discussed with the patient including bleeding, infection, pneumothorax, weakness, and headache flare.   Questions were answered and she agrees to proceed. Voluntary informed consent was obtained and signed.     Last Botox Date: 4/3/2025. The intensity is reduced about 80% and the duration and number of headaches has reduced significantly.     Vitals were reviewed: Yes  Allergies were reviewed:  Yes    Medications were reviewed:  Yes     Procedure:  After getting informed consent, a Pause for the Cause was performed.  Patient was prepped and draped with chloroprep.    A 27 gauge needle was used to make the injections.  After negative aspiration, botox was injected bilaterally into the following locations:    Procerus- 5 units (1 site)  Frontals- 20 units (4 sites)   -10 units (2 sites)  Temporalis- 40 units (8 sites)  Occipitis- 30 units (6 sites)  Cervical paraspinals- 20 units (4 sites).  Upper Trapezius- 30 units (6 sites)           Hemostasis was achieved.    Total units used: 155  Total units wasted: 45  Botox lot numbers and Expiration dates:  SEE MAR      Bandaids were placed when appropriate.  The patient tolerated the procedure well.    Follow-up includes:    -f/u phone call in one week  -can be repeated after 3 full months have passed.      VIVIAN AWAD MD   Pain Management

## 2025-07-03 ENCOUNTER — OFFICE VISIT (OUTPATIENT)
Dept: PALLIATIVE MEDICINE | Facility: CLINIC | Age: 40
End: 2025-07-03
Payer: COMMERCIAL

## 2025-07-03 VITALS — SYSTOLIC BLOOD PRESSURE: 148 MMHG | DIASTOLIC BLOOD PRESSURE: 102 MMHG | OXYGEN SATURATION: 100 % | HEART RATE: 84 BPM

## 2025-07-03 DIAGNOSIS — G43.009 MIGRAINE WITHOUT AURA AND WITHOUT STATUS MIGRAINOSUS, NOT INTRACTABLE: Primary | ICD-10-CM

## 2025-07-03 ASSESSMENT — PAIN SCALES - GENERAL: PAINLEVEL_OUTOF10: MILD PAIN (3)

## 2025-07-03 NOTE — PATIENT INSTRUCTIONS
[facility]  Botox Injection Discharge Instructions    Do not rub or put extended pressure on the injection sites. You may gently touch the sites to remove any excess blood.  Monitor the injection sites for signs and symptoms of infection such as redness, swelling, warmth, fever, chills, or drainage to areas.  You may have soreness at the injection sites for up to 24 hours.  If you are able to use anti-inflammatory medications or Tylenol for pain control, you can take these as directed.  It may take up to 1 month to notice benefit from the 1st treatment  If you do notice relief, botox can be done every 3 months.  It may require insurance authorization everytime.    For questions about insurance coverage, please call the main clinic number and ask to speak with someone about botox coverage.     Pain Clinic phone number during work hours (Monday through Friday 8 am-4:30 pm) at [phone] or the Provider Line after hours at [phone]:

## 2025-07-29 ENCOUNTER — MYC REFILL (OUTPATIENT)
Dept: FAMILY MEDICINE | Facility: CLINIC | Age: 40
End: 2025-07-29
Payer: COMMERCIAL

## 2025-07-29 DIAGNOSIS — F90.8 ADHD, ADULT RESIDUAL TYPE: ICD-10-CM

## 2025-07-31 RX ORDER — DEXTROAMPHETAMINE SACCHARATE, AMPHETAMINE ASPARTATE, DEXTROAMPHETAMINE SULFATE AND AMPHETAMINE SULFATE 2.5; 2.5; 2.5; 2.5 MG/1; MG/1; MG/1; MG/1
10 TABLET ORAL 2 TIMES DAILY
Qty: 60 TABLET | Refills: 0 | Status: SHIPPED | OUTPATIENT
Start: 2025-07-31 | End: 2025-08-30

## 2025-07-31 RX ORDER — DEXTROAMPHETAMINE SACCHARATE, AMPHETAMINE ASPARTATE, DEXTROAMPHETAMINE SULFATE AND AMPHETAMINE SULFATE 2.5; 2.5; 2.5; 2.5 MG/1; MG/1; MG/1; MG/1
10 TABLET ORAL 2 TIMES DAILY
Qty: 60 TABLET | Refills: 0 | Status: SHIPPED | OUTPATIENT
Start: 2025-08-30 | End: 2025-09-29

## 2025-07-31 RX ORDER — DEXTROAMPHETAMINE SACCHARATE, AMPHETAMINE ASPARTATE, DEXTROAMPHETAMINE SULFATE AND AMPHETAMINE SULFATE 2.5; 2.5; 2.5; 2.5 MG/1; MG/1; MG/1; MG/1
10 TABLET ORAL 2 TIMES DAILY
Qty: 60 TABLET | Refills: 0 | OUTPATIENT
Start: 2025-07-31